# Patient Record
Sex: FEMALE | Race: BLACK OR AFRICAN AMERICAN | Employment: FULL TIME | URBAN - METROPOLITAN AREA
[De-identification: names, ages, dates, MRNs, and addresses within clinical notes are randomized per-mention and may not be internally consistent; named-entity substitution may affect disease eponyms.]

---

## 2018-04-24 ENCOUNTER — OFFICE VISIT (OUTPATIENT)
Dept: INTERNAL MEDICINE CLINIC | Age: 58
End: 2018-04-24

## 2018-04-24 VITALS
RESPIRATION RATE: 16 BRPM | DIASTOLIC BLOOD PRESSURE: 83 MMHG | WEIGHT: 200.1 LBS | HEIGHT: 66 IN | TEMPERATURE: 97.9 F | SYSTOLIC BLOOD PRESSURE: 143 MMHG | OXYGEN SATURATION: 97 % | BODY MASS INDEX: 32.16 KG/M2 | HEART RATE: 69 BPM

## 2018-04-24 DIAGNOSIS — H54.40 BLINDNESS OF LEFT EYE: ICD-10-CM

## 2018-04-24 DIAGNOSIS — I10 ESSENTIAL HYPERTENSION: ICD-10-CM

## 2018-04-24 DIAGNOSIS — Z76.89 ENCOUNTER TO ESTABLISH CARE: Primary | ICD-10-CM

## 2018-04-24 RX ORDER — LISINOPRIL AND HYDROCHLOROTHIAZIDE 10; 12.5 MG/1; MG/1
1 TABLET ORAL DAILY
Qty: 90 TAB | Refills: 3 | Status: SHIPPED | OUTPATIENT
Start: 2018-04-24 | End: 2019-05-16 | Stop reason: SDUPTHER

## 2018-04-24 RX ORDER — LISINOPRIL AND HYDROCHLOROTHIAZIDE 10; 12.5 MG/1; MG/1
TABLET ORAL
Refills: 0 | COMMUNITY
Start: 2018-04-18 | End: 2018-04-24 | Stop reason: SDUPTHER

## 2018-04-24 NOTE — PROGRESS NOTES
Chief Complaint   Patient presents with    Hypertension     1. Have you been to the ER, urgent care clinic since your last visit? Hospitalized since your last visit? Yes Where: [facility]  elevated Blood Pressure    2. Have you seen or consulted any other health care providers outside of the [address] [place] [address] since your last visit? Include any pap smears or colon screening.  No

## 2018-04-24 NOTE — PROGRESS NOTES
Hypertension       Subjective:   HPI     63year old patient presents to hospitals care, and newly diagnosed hypertension. She apparently went to eye doctor, Dr. Cyndee Ceja in [place] and was discovered her BP was 196/120. She was advised to go to the ER, but went home instead. Later went to [facility] and BP was 201/120; then went to the ER at [facility] on 4/18/18 and BP was 204/120. She was given an IV drip of unknown medication and prescribed Lisinopril-hydrochlorthiazide 10mg/12.5 mg daily. 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 She reports the following history and medical concerns:  No health concerns. Hypertension Review:  The patient has essential hypertension  Diet and Lifestyle: generally follows a  low sodium diet, exercises sporadically  Home BP Monitoring: is not measured at home. Pertinent ROS: taking medications as instructed, no medication side effects noted, no TIA's, no chest pain on exertion, no dyspnea on exertion. Mild edema right ankle. Past Medical History:   Diagnosis Date    Blindness of left eye     Hypertension        Past Surgical History:   Procedure Laterality Date    HX GYN         Prior to Admission medications    Medication Sig Start Date End Date Taking? Authorizing Provider   lisinopril-hydroCHLOROthiazide (PRINZIDE, ZESTORETIC) 10-12.5 mg per tablet Take 1 Tab by mouth daily. 4/24/18  Yes Kvng Gardner NP         Not on File     Social History     Social History    Marital status: UNKNOWN     Spouse name: N/A    Number of children: N/A    Years of education: N/A     Occupational History    Not on file.      Social History Main Topics    Smoking status: Former Smoker     Packs/day: 3.00    Smokeless tobacco: Never Used      Comment: smoker for 17 years; 3 cigs a day    Alcohol use 0.6 oz/week     1 Glasses of wine per week      Comment: social    Drug use: No    Sexual activity: No     Other Topics Concern    Not on file     Social History Narrative    No narrative on file        Family History   Problem Relation Age of Onset    Hypertension Mother     Hypertension Father     Hypertension Sister     Hypertension Brother         Review of Systems   Constitutional: Negative for chills, diaphoresis, fever, malaise/fatigue and weight loss. HENT: Negative for congestion, ear discharge, ear pain, hearing loss, nosebleeds, sinus pain, sore throat and tinnitus. Eyes: Negative for blurred vision, double vision, photophobia, pain, discharge and redness. Blind in the left eye   Respiratory: Negative for cough, shortness of breath and wheezing. Cardiovascular: Negative for chest pain, palpitations and leg swelling. Mild edema right ankle. Gastrointestinal: Negative for abdominal pain, diarrhea, heartburn, nausea and vomiting. Genitourinary: Negative for dysuria. Musculoskeletal: Negative for myalgias. Skin: Negative for itching and rash. Neurological: Negative for dizziness, tingling, tremors, sensory change, speech change, weakness and headaches. Psychiatric/Behavioral: Negative for depression. Assessment/ Plan:     Vitals:    04/24/18 1520   BP: 143/83   Pulse: 69   Resp: 16   Temp: 97.9 °F (36.6 °C)   TempSrc: Oral   SpO2: 97%   Weight: 200 lb 1.6 oz (90.8 kg)   Height: 5' 6\" (1.676 m)   PainSc:   0 - No pain        Physical Exam   Constitutional: She is oriented to person, place, and time. She appears well-nourished. HENT:   Head: Normocephalic and atraumatic. Eyes: Conjunctivae and EOM are normal. Pupils are equal, round, and reactive to light. Neck: Normal range of motion. Neck supple. No JVD present. No thyromegaly present. Cardiovascular: Normal rate, regular rhythm, normal heart sounds and intact distal pulses. Pulmonary/Chest: Effort normal and breath sounds normal. No respiratory distress. She has no wheezes. She has no rales. She exhibits no tenderness.    Musculoskeletal: Normal range of motion. Mild right ankle edema   Lymphadenopathy:     She has no cervical adenopathy. Neurological: She is alert and oriented to person, place, and time. She has normal reflexes. She displays normal reflexes. No cranial nerve deficit. She exhibits normal muscle tone. Coordination normal.   Skin: Skin is warm and dry. Psychiatric: She has a normal mood and affect. Nursing note and vitals reviewed. ICD-10-CM ICD-9-CM    1. Encounter to establish care Z76.89 V65.8    2. Essential hypertension I10 401.9    3. Blindness of left eye H54.40 369.60         Orders Placed This Encounter    DISCONTD: lisinopril-hydroCHLOROthiazide (PRINZIDE, ZESTORETIC) 10-12.5 mg per tablet     Sig: TK 1 T PO ONCE D FOR 30 DAYS     Refill:  0    lisinopril-hydroCHLOROthiazide (PRINZIDE, ZESTORETIC) 10-12.5 mg per tablet     Sig: Take 1 Tab by mouth daily. Dispense:  90 Tab     Refill:  3        Assessment/ Plan:     I have reviewed the patient's medical history in detail and updated the computerized patient record. We had a prolonged discussion about the complications of uncontrolled hypertension and the importance taking medication daily and as prescribed and went over the various important aspects to consider. All questions were answered. Advised her to call back, return to office, or go to ER  if symptoms do not improve, change in nature, or persist. Schedule appt for one week for physical exam, labs, and check BP. She was given an after visit summary or informed of [employer] [employer] which includes patient instructions, diagnoses, current medications, & vitals. She expressed understanding with the diagnosis and plan and was given the opportunity to ask questions.      Sophie Escobedo, KARYN

## 2018-04-24 NOTE — MR AVS SNAPSHOT
[address] [place] [address] 
[phone] Patient: Triston Durbin MRN: [id number] EBL:[id number] Visit Information Date & Time Provider Department Dept. Phone Encounter #  
 4/24/2018  3:30 PM Kit Ruiz NP [address] [facility] [id number] Follow-up Instructions Return in about 1 week (around 5/1/2018), or if symptoms worsen or fail to improve, for physical exam, labs, check BP. Upcoming Health Maintenance Date Due Hepatitis C Screening 1960 DTaP/Tdap/Td series (1 - Tdap) 2/17/1982 PAP AKA CERVICAL CYTOLOGY 2/17/1982 BREAST CANCER SCRN MAMMOGRAM 2/17/2011 FOBT Q 1 YEAR AGE 50-75 2/17/2011 Influenza Age 5 to Adult 8/1/2017 Allergies as of 4/24/2018  Review Complete On: 4/24/2018 By: Kit Ruiz NP Not on File Current Immunizations  Never Reviewed No immunizations on file. Not reviewed this visit You Were Diagnosed With   
  
 Codes Comments Essential hypertension    -  Primary ICD-10-CM: I10 
ICD-9-CM: 401.9 Blindness of left eye     ICD-10-CM: H54.40 ICD-9-CM: 369.60 Encounter to establish care     ICD-10-CM: Z76.89 
ICD-9-CM: V65.8 Vitals BP Pulse Temp Resp Height(growth percentile) Weight(growth percentile) 143/83 (BP 1 Location: Right arm, BP Patient Position: Sitting) 69 97.9 °F (36.6 °C) (Oral) 16 5' 6\" (1.676 m) 200 lb 1.6 oz (90.8 kg) SpO2 BMI OB Status Smoking Status 97% 32.3 kg/m2 Postmenopausal Former Smoker Vitals History BMI and BSA Data Body Mass Index Body Surface Area  
 32.3 kg/m 2 2.06 m 2 Preferred Pharmacy Pharmacy Name Phone [address] [facility]. [phone] Your Updated Medication List  
  
   
 This list is accurate as of 4/24/18  3:54 PM.  Always use your most recent med list.  
  
  
  
  
 lisinopril-hydroCHLOROthiazide 10-12.5 mg per tablet Commonly known as:  Jensen Valenzuela Take 1 Tab by mouth daily. Prescriptions Sent to Pharmacy Refills  
 lisinopril-hydroCHLOROthiazide (PRINZIDE, ZESTORETIC) 10-12.5 mg per tablet 3 Sig: Take 1 Tab by mouth daily. Class: Normal  
 Pharmacy: [facility] [address], [address] [place] [address] AT [address] [facility].  #: [phone] Route: Oral  
  
Follow-up Instructions Return in about 1 week (around 5/1/2018), or if symptoms worsen or fail to improve, for physical exam, labs, check BP. Patient Instructions High Blood Pressure: Care Instructions Your Care Instructions If your blood pressure is usually above 140/90, you have high blood pressure, or hypertension. That means the top number is 140 or higher or the bottom number is 90 or higher, or both. Despite what a lot of people think, high blood pressure usually doesn't cause headaches or make you feel dizzy or lightheaded. It usually has no symptoms. But it does increase your risk for heart attack, stroke, and kidney or eye damage. The higher your blood pressure, the more your risk increases. Your doctor will give you a goal for your blood pressure. Your goal will be based on your health and your age. An example of a goal is to keep your blood pressure below 140/90. Lifestyle changes, such as eating healthy and being active, are always important to help lower blood pressure. You might also take medicine to reach your blood pressure goal. 
Follow-up care is a key part of your treatment and safety. Be sure to make and go to all appointments, and call your doctor if you are having problems. It's also a good idea to know your test results and keep a list of the medicines you take. How can you care for yourself at home? Medical treatment · If you stop taking your medicine, your blood pressure will go back up. You may take one or more types of medicine to lower your blood pressure. Be safe with medicines. Take your medicine exactly as prescribed. Call your doctor if you think you are having a problem with your medicine. · Talk to your doctor before you start taking aspirin every day. Aspirin can help certain people lower their risk of a heart attack or stroke. But taking aspirin isn't right for everyone, because it can cause serious bleeding. · See your doctor regularly. You may need to see the doctor more often at first or until your blood pressure comes down. · If you are taking blood pressure medicine, talk to your doctor before you take decongestants or anti-inflammatory medicine, such as ibuprofen. Some of these medicines can raise blood pressure. · Learn how to check your blood pressure at home. Lifestyle changes · Stay at a healthy weight. This is especially important if you put on weight around the waist. Losing even 10 pounds can help you lower your blood pressure. · If your doctor recommends it, get more exercise. Walking is a good choice. Bit by bit, increase the amount you walk every day. Try for at least 30 minutes on most days of the week. You also may want to swim, bike, or do other activities. · Avoid or limit alcohol. Talk to your doctor about whether you can drink any alcohol. · Try to limit how much sodium you eat to less than 2,300 milligrams (mg) a day. Your doctor may ask you to try to eat less than 1,500 mg a day. · Eat plenty of fruits (such as bananas and oranges), vegetables, legumes, whole grains, and low-fat dairy products. · Lower the amount of saturated fat in your diet. Saturated fat is found in animal products such as milk, cheese, and meat. Limiting these foods may help you lose weight and also lower your risk for heart disease. · Do not smoke. Smoking increases your risk for heart attack and stroke. If you need help quitting, talk to your doctor about stop-smoking programs and medicines. These can increase your chances of quitting for good. When should you call for help? Call 911 anytime you think you may need emergency care. This may mean having symptoms that suggest that your blood pressure is causing a serious heart or blood vessel problem. Your blood pressure may be over 180/110. ? For example, call 911 if: 
? · You have symptoms of a heart attack. These may include: ¨ Chest pain or pressure, or a strange feeling in the chest. 
¨ Sweating. ¨ Shortness of breath. ¨ Nausea or vomiting. ¨ Pain, pressure, or a strange feeling in the back, neck, jaw, or upper belly or in one or both shoulders or arms. ¨ Lightheadedness or sudden weakness. ¨ A fast or irregular heartbeat. ? · You have symptoms of a stroke. These may include: 
¨ Sudden numbness, tingling, weakness, or loss of movement in your face, arm, or leg, especially on only one side of your body. ¨ Sudden vision changes. ¨ Sudden trouble speaking. ¨ Sudden confusion or trouble understanding simple statements. ¨ Sudden problems with walking or balance. ¨ A sudden, severe headache that is different from past headaches. ? · You have severe back or belly pain. ?Do not wait until your blood pressure comes down on its own. Get help right away. ?Call your doctor now or seek immediate care if: 
? · Your blood pressure is much higher than normal (such as 180/110 or higher), but you don't have symptoms. ? · You think high blood pressure is causing symptoms, such as: ¨ Severe headache. ¨ Blurry vision. ? Watch closely for changes in your health, and be sure to contact your doctor if: 
? · Your blood pressure measures 140/90 or higher at least 2 times. That means the top number is 140 or higher or the bottom number is 90 or higher, or both. ? · You think you may be having side effects from your blood pressure medicine. ? · Your blood pressure is usually normal, but it goes above normal at least 2 times. Where can you learn more? Go to http://lidia-glenis.info/. Enter Q280 in the search box to learn more about \"High Blood Pressure: Care Instructions. \" Current as of: September 21, 2016 Content Version: 11.4 © 7687-8729 [employer]. Care instructions adapted under license by [employer] (which disclaims liability or warranty for this information). If you have questions about a medical condition or this instruction, always ask your healthcare professional. [address] any warranty or liability for your use of this information. Introducing [facility]! [employer] introduces ideaTree - innovate | mentor | invest patient portal. Now you can access parts of your medical record, email your doctor's office, and request medication refills online. 1. In your internet browser, go to [email or web address] 2. Click on the First Time User? Click Here link in the Sign In box. You will see the New Member Sign Up page. 3. Enter your ideaTree - innovate | mentor | invest Access Code exactly as it appears below. You will not need to use this code after youve completed the sign-up process. If you do not sign up before the expiration date, you must request a new code. · ideaTree - innovate | mentor | invest Access Code: [email or web address] Expires: 7/23/2018  3:50 PM 
 
4. Enter the last four digits of your Social Security Number (xxxx) and Date of Birth (mm/dd/yyyy) as indicated and click Submit. You will be taken to the next sign-up page. 5. Create a ideaTree - innovate | mentor | invest ID. This will be your ideaTree - innovate | mentor | invest login ID and cannot be changed, so think of one that is secure and easy to remember. 6. Create a ideaTree - innovate | mentor | invest password. You can change your password at any time. 7. Enter your Password Reset Question and Answer.  This can be used at a later time if you forget your password. 8. Enter your e-mail address. You will receive e-mail notification when new information is available in [address] 9. Click Sign Up. You can now view and download portions of your medical record. 10. Click the Download Summary menu link to download a portable copy of your medical information. If you have questions, please visit the Frequently Asked Questions section of the [employer] website. Remember, [employer] is NOT to be used for urgent needs. For medical emergencies, dial 911. Now available from your iPhone and Android! Please provide this summary of care documentation to your next provider. If you have any questions after today's visit, please call [phone].

## 2018-04-24 NOTE — PATIENT INSTRUCTIONS
High Blood Pressure: Care Instructions  Your Care Instructions    If your blood pressure is usually above 140/90, you have high blood pressure, or hypertension. That means the top number is 140 or higher or the bottom number is 90 or higher, or both. Despite what a lot of people think, high blood pressure usually doesn't cause headaches or make you feel dizzy or lightheaded. It usually has no symptoms. But it does increase your risk for heart attack, stroke, and kidney or eye damage. The higher your blood pressure, the more your risk increases. Your doctor will give you a goal for your blood pressure. Your goal will be based on your health and your age. An example of a goal is to keep your blood pressure below 140/90. Lifestyle changes, such as eating healthy and being active, are always important to help lower blood pressure. You might also take medicine to reach your blood pressure goal.  Follow-up care is a key part of your treatment and safety. Be sure to make and go to all appointments, and call your doctor if you are having problems. It's also a good idea to know your test results and keep a list of the medicines you take. How can you care for yourself at home? Medical treatment  · If you stop taking your medicine, your blood pressure will go back up. You may take one or more types of medicine to lower your blood pressure. Be safe with medicines. Take your medicine exactly as prescribed. Call your doctor if you think you are having a problem with your medicine. · Talk to your doctor before you start taking aspirin every day. Aspirin can help certain people lower their risk of a heart attack or stroke. But taking aspirin isn't right for everyone, because it can cause serious bleeding. · See your doctor regularly. You may need to see the doctor more often at first or until your blood pressure comes down.   · If you are taking blood pressure medicine, talk to your doctor before you take decongestants or anti-inflammatory medicine, such as ibuprofen. Some of these medicines can raise blood pressure. · Learn how to check your blood pressure at home. Lifestyle changes  · Stay at a healthy weight. This is especially important if you put on weight around the waist. Losing even 10 pounds can help you lower your blood pressure. · If your doctor recommends it, get more exercise. Walking is a good choice. Bit by bit, increase the amount you walk every day. Try for at least 30 minutes on most days of the week. You also may want to swim, bike, or do other activities. · Avoid or limit alcohol. Talk to your doctor about whether you can drink any alcohol. · Try to limit how much sodium you eat to less than 2,300 milligrams (mg) a day. Your doctor may ask you to try to eat less than 1,500 mg a day. · Eat plenty of fruits (such as bananas and oranges), vegetables, legumes, whole grains, and low-fat dairy products. · Lower the amount of saturated fat in your diet. Saturated fat is found in animal products such as milk, cheese, and meat. Limiting these foods may help you lose weight and also lower your risk for heart disease. · Do not smoke. Smoking increases your risk for heart attack and stroke. If you need help quitting, talk to your doctor about stop-smoking programs and medicines. These can increase your chances of quitting for good. When should you call for help? Call 911 anytime you think you may need emergency care. This may mean having symptoms that suggest that your blood pressure is causing a serious heart or blood vessel problem. Your blood pressure may be over 180/110. ? For example, call 911 if:  ? · You have symptoms of a heart attack. These may include:  ¨ Chest pain or pressure, or a strange feeling in the chest.  ¨ Sweating. ¨ Shortness of breath. ¨ Nausea or vomiting.   ¨ Pain, pressure, or a strange feeling in the back, neck, jaw, or upper belly or in one or both shoulders or arms.  ¨ Lightheadedness or sudden weakness. ¨ A fast or irregular heartbeat. ? · You have symptoms of a stroke. These may include:  ¨ Sudden numbness, tingling, weakness, or loss of movement in your face, arm, or leg, especially on only one side of your body. ¨ Sudden vision changes. ¨ Sudden trouble speaking. ¨ Sudden confusion or trouble understanding simple statements. ¨ Sudden problems with walking or balance. ¨ A sudden, severe headache that is different from past headaches. ? · You have severe back or belly pain. ?Do not wait until your blood pressure comes down on its own. Get help right away. ?Call your doctor now or seek immediate care if:  ? · Your blood pressure is much higher than normal (such as 180/110 or higher), but you don't have symptoms. ? · You think high blood pressure is causing symptoms, such as:  ¨ Severe headache. ¨ Blurry vision. ? Watch closely for changes in your health, and be sure to contact your doctor if:  ? · Your blood pressure measures 140/90 or higher at least 2 times. That means the top number is 140 or higher or the bottom number is 90 or higher, or both. ? · You think you may be having side effects from your blood pressure medicine. ? · Your blood pressure is usually normal, but it goes above normal at least 2 times. Where can you learn more? Go to http://lidia-glenis.info/. Enter V302 in the search box to learn more about \"High Blood Pressure: Care Instructions. \"  Current as of: September 21, 2016  Content Version: 11.4  © 6061-0547 [employer]. Care instructions adapted under license by [employer] (which disclaims liability or warranty for this information). If you have questions about a medical condition or this instruction, always ask your healthcare professional. [place] [address] any warranty or liability for your use of this information.

## 2018-05-02 ENCOUNTER — OFFICE VISIT (OUTPATIENT)
Dept: INTERNAL MEDICINE CLINIC | Age: 58
End: 2018-05-02

## 2018-05-02 VITALS
HEART RATE: 74 BPM | DIASTOLIC BLOOD PRESSURE: 87 MMHG | RESPIRATION RATE: 16 BRPM | BODY MASS INDEX: 32.54 KG/M2 | SYSTOLIC BLOOD PRESSURE: 134 MMHG | HEIGHT: 66 IN | TEMPERATURE: 98.2 F | OXYGEN SATURATION: 97 % | WEIGHT: 202.5 LBS

## 2018-05-02 DIAGNOSIS — Z00.00 HEALTHCARE MAINTENANCE: ICD-10-CM

## 2018-05-02 DIAGNOSIS — Z00.00 ANNUAL PHYSICAL EXAM: Primary | ICD-10-CM

## 2018-05-02 DIAGNOSIS — Z13.1 SCREENING FOR DIABETES MELLITUS: ICD-10-CM

## 2018-05-02 DIAGNOSIS — Z12.39 SCREENING FOR BREAST CANCER: ICD-10-CM

## 2018-05-02 DIAGNOSIS — N89.8 VAGINAL MASS: ICD-10-CM

## 2018-05-02 DIAGNOSIS — Z13.220 SCREENING FOR CHOLESTEROL LEVEL: ICD-10-CM

## 2018-05-02 DIAGNOSIS — Z12.11 SCREEN FOR COLON CANCER: ICD-10-CM

## 2018-05-02 DIAGNOSIS — I10 ESSENTIAL HYPERTENSION: ICD-10-CM

## 2018-05-02 DIAGNOSIS — Z11.59 NEED FOR HEPATITIS C SCREENING TEST: ICD-10-CM

## 2018-05-02 DIAGNOSIS — H10.13 ALLERGIC CONJUNCTIVITIS OF BOTH EYES: ICD-10-CM

## 2018-05-02 RX ORDER — AZELASTINE HYDROCHLORIDE 0.5 MG/ML
1 SOLUTION/ DROPS OPHTHALMIC 2 TIMES DAILY
Qty: 6 ML | Refills: 0 | Status: SHIPPED | OUTPATIENT
Start: 2018-05-02 | End: 2019-08-01

## 2018-05-02 NOTE — MR AVS SNAPSHOT
Meghann Rakeshbrandon 
 
 
 [address] [facility],[address] [facility] 7 [address] 
[phone] Patient: Judith Barfield MRN: [id number] QEA:[id number] Visit Information Date & Time Provider Department Dept. Phone Encounter #  
 5/2/2018 10:30 AM Lencho Swanson NP [address] [facility] [phone] [id number] Follow-up Instructions Return in about 2 weeks (around 5/16/2018), or if symptoms worsen or fail to improve, for to discuss results and check BP. Upcoming Health Maintenance Date Due Hepatitis C Screening 1960 DTaP/Tdap/Td series (1 - Tdap) 2/17/1982 PAP AKA CERVICAL CYTOLOGY 2/17/1982 BREAST CANCER SCRN MAMMOGRAM 2/17/2011 FOBT Q 1 YEAR AGE 50-75 2/17/2011 Influenza Age 5 to Adult 8/1/2018 Allergies as of 5/2/2018  Review Complete On: 5/2/2018 By: Gaurang Mayo Severity Noted Reaction Type Reactions Seasonale [Levonorgestrel-ethinyl Estrad] Medium 05/02/2018    Runny Nose, Hoarseness Current Immunizations  Never Reviewed No immunizations on file. Not reviewed this visit You Were Diagnosed With   
  
 Codes Comments Annual physical exam    -  Primary ICD-10-CM: Z00.00 ICD-9-CM: V70.0 Essential hypertension     ICD-10-CM: I10 
ICD-9-CM: 401.9 Healthcare maintenance     ICD-10-CM: Z00.00 ICD-9-CM: V70.0 Screening for diabetes mellitus     ICD-10-CM: Z13.1 ICD-9-CM: V77.1 Screen for colon cancer     ICD-10-CM: Z12.11 ICD-9-CM: V76.51 Screening for cholesterol level     ICD-10-CM: Z13.220 ICD-9-CM: V77.91 Need for hepatitis C screening test     ICD-10-CM: Z11.59 
ICD-9-CM: V73.89 Screening for breast cancer     ICD-10-CM: Z12.31 
ICD-9-CM: V76.10 Vaginal mass     ICD-10-CM: N89.9 ICD-9-CM: 625.8 Allergic conjunctivitis of both eyes     ICD-10-CM: H10.13 ICD-9-CM: 372.14 Vitals BP Pulse Temp Resp Height(growth percentile) Weight(growth percentile) 134/87 (BP 1 Location: Left arm, BP Patient Position: Sitting) 74 98.2 °F (36.8 °C) (Oral) 16 5' 6\" (1.676 m) 202 lb 8 oz (91.9 kg) SpO2 BMI OB Status Smoking Status 97% 32.68 kg/m2 Postmenopausal Former Smoker Vitals History BMI and BSA Data Body Mass Index Body Surface Area  
 32.68 kg/m 2 2.07 m 2 Preferred Pharmacy Pharmacy Name Phone [address] AT [address] [facility]. [phone] Your Updated Medication List  
  
   
This list is accurate as of 5/2/18 11:36 AM.  Always use your most recent med list.  
  
  
  
  
 azelastine 0.05 % ophthalmic solution Commonly known as:  OPTIVAR Administer 1 Drop to both eyes two (2) times a day. Use in affected eye(s)  
  
 lisinopril-hydroCHLOROthiazide 10-12.5 mg per tablet Commonly known as:  Maritza Jacobo Take 1 Tab by mouth daily. Prescriptions Sent to Pharmacy Refills  
 azelastine (OPTIVAR) 0.05 % ophthalmic solution 0 Sig: Administer 1 Drop to both eyes two (2) times a day. Use in affected eye(s) Class: Normal  
 Pharmacy: [facility] [address] [place] [address], [address] [place] [address] [address] AT [address] [facility] [address] Ph #: [phone] Route: Both Eyes We Performed the Following AMB POC URINALYSIS DIP STICK AUTO W/O MICRO [04107 CPT(R)] CBC WITH AUTOMATED DIFF [81414 CPT(R)] HEMOGLOBIN A1C WITH EAG [46489 CPT(R)] HEPATITIS C AB [45761 CPT(R)] LIPID PANEL [05000 CPT(R)] METABOLIC PANEL, COMPREHENSIVE [17479 CPT(R)] OCCULT BLOOD, IMMUNOASSAY (FIT) [id number] CPT(R)] REFERRAL TO GASTROENTEROLOGY [NEC13 Custom] Comments:  
 Screening colonoscopy REFERRAL TO OBSTETRICS AND GYNECOLOGY [REF51 Custom] Comments:  
 Evaluate and treat abnormal vulvovaginal mass or prolapse. Unable to complete PAP smear. Hx of hemmrhoids. VITAMIN D, 25 HYDROXY [id number] CPT(R)] Follow-up Instructions Return in about 2 weeks (around 5/16/2018), or if symptoms worsen or fail to improve, for to discuss results and check BP. To-Do List   
 05/02/2018 Imaging:  [facility] MAMMO BI SCREENING INCL CAD Referral Information Referral ID Referred By Referred To  
  
 [id number] YAS, [address] [facility] [address] [place], [address] Visits Status Start Date End Date 1 New Request 5/2/18 5/2/19 If your referral has a status of pending review or denied, additional information will be sent to support the outcome of this decision. Referral ID Referred By Referred To  
 [id number] Edgar MILIAN [facility] [address] [facility] [address] [place], [address] Visits Status Start Date End Date 1 New Request 5/2/18 5/2/19 If your referral has a status of pending review or denied, additional information will be sent to support the outcome of this decision. Patient Instructions High Blood Pressure: Care Instructions Your Care Instructions If your blood pressure is usually above 140/90, you have high blood pressure, or hypertension. That means the top number is 140 or higher or the bottom number is 90 or higher, or both. Despite what a lot of people think, high blood pressure usually doesn't cause headaches or make you feel dizzy or lightheaded. It usually has no symptoms. But it does increase your risk for heart attack, stroke, and kidney or eye damage. The higher your blood pressure, the more your risk increases. Your doctor will give you a goal for your blood pressure. Your goal will be based on your health and your age. An example of a goal is to keep your blood pressure below 140/90.  
Lifestyle changes, such as eating healthy and being active, are always important to help lower blood pressure. You might also take medicine to reach your blood pressure goal. 
Follow-up care is a key part of your treatment and safety. Be sure to make and go to all appointments, and call your doctor if you are having problems. It's also a good idea to know your test results and keep a list of the medicines you take. How can you care for yourself at home? Medical treatment · If you stop taking your medicine, your blood pressure will go back up. You may take one or more types of medicine to lower your blood pressure. Be safe with medicines. Take your medicine exactly as prescribed. Call your doctor if you think you are having a problem with your medicine. · Talk to your doctor before you start taking aspirin every day. Aspirin can help certain people lower their risk of a heart attack or stroke. But taking aspirin isn't right for everyone, because it can cause serious bleeding. · See your doctor regularly. You may need to see the doctor more often at first or until your blood pressure comes down. · If you are taking blood pressure medicine, talk to your doctor before you take decongestants or anti-inflammatory medicine, such as ibuprofen. Some of these medicines can raise blood pressure. · Learn how to check your blood pressure at home. Lifestyle changes · Stay at a healthy weight. This is especially important if you put on weight around the waist. Losing even 10 pounds can help you lower your blood pressure. · If your doctor recommends it, get more exercise. Walking is a good choice. Bit by bit, increase the amount you walk every day. Try for at least 30 minutes on most days of the week. You also may want to swim, bike, or do other activities. · Avoid or limit alcohol. Talk to your doctor about whether you can drink any alcohol. · Try to limit how much sodium you eat to less than 2,300 milligrams (mg) a day. Your doctor may ask you to try to eat less than 1,500 mg a day. · Eat plenty of fruits (such as bananas and oranges), vegetables, legumes, whole grains, and low-fat dairy products. · Lower the amount of saturated fat in your diet. Saturated fat is found in animal products such as milk, cheese, and meat. Limiting these foods may help you lose weight and also lower your risk for heart disease. · Do not smoke. Smoking increases your risk for heart attack and stroke. If you need help quitting, talk to your doctor about stop-smoking programs and medicines. These can increase your chances of quitting for good. When should you call for help? Call 911 anytime you think you may need emergency care. This may mean having symptoms that suggest that your blood pressure is causing a serious heart or blood vessel problem. Your blood pressure may be over 180/110. ? For example, call 911 if: 
? · You have symptoms of a heart attack. These may include: ¨ Chest pain or pressure, or a strange feeling in the chest. 
¨ Sweating. ¨ Shortness of breath. ¨ Nausea or vomiting. ¨ Pain, pressure, or a strange feeling in the back, neck, jaw, or upper belly or in one or both shoulders or arms. ¨ Lightheadedness or sudden weakness. ¨ A fast or irregular heartbeat. ? · You have symptoms of a stroke. These may include: 
¨ Sudden numbness, tingling, weakness, or loss of movement in your face, arm, or leg, especially on only one side of your body. ¨ Sudden vision changes. ¨ Sudden trouble speaking. ¨ Sudden confusion or trouble understanding simple statements. ¨ Sudden problems with walking or balance. ¨ A sudden, severe headache that is different from past headaches. ? · You have severe back or belly pain. ?Do not wait until your blood pressure comes down on its own. Get help right away. ?Call your doctor now or seek immediate care if: 
? · Your blood pressure is much higher than normal (such as 180/110 or higher), but you don't have symptoms. ? · You think high blood pressure is causing symptoms, such as: ¨ Severe headache. ¨ Blurry vision. ? Watch closely for changes in your health, and be sure to contact your doctor if: 
? · Your blood pressure measures 140/90 or higher at least 2 times. That means the top number is 140 or higher or the bottom number is 90 or higher, or both. ? · You think you may be having side effects from your blood pressure medicine. ? · Your blood pressure is usually normal, but it goes above normal at least 2 times. Where can you learn more? Go to http://lidia-glenis.info/. Enter Y373 in the search box to learn more about \"High Blood Pressure: Care Instructions. \" Current as of: September 21, 2016 Content Version: 11.4 © 0629-4476 [employer]. Care instructions adapted under license by [employer] (which disclaims liability or warranty for this information). If you have questions about a medical condition or this instruction, always ask your healthcare professional. [place] [address] any warranty or liability for your use of this information. Well Visit, Women 48 to 72: Care Instructions Your Care Instructions Physical exams can help you stay healthy. Your doctor has checked your overall health and may have suggested ways to take good care of yourself. He or she also may have recommended tests. At home, you can help prevent illness with healthy eating, regular exercise, and other steps. Follow-up care is a key part of your treatment and safety. Be sure to make and go to all appointments, and call your doctor if you are having problems. It's also a good idea to know your test results and keep a list of the medicines you take. How can you care for yourself at home? · Reach and stay at a healthy weight. This will lower your risk for many problems, such as obesity, diabetes, heart disease, and high blood pressure. · Get at least 30 minutes of exercise on most days of the week. Walking is a good choice. You also may want to do other activities, such as running, swimming, cycling, or playing tennis or team sports. · Do not smoke. Smoking can make health problems worse. If you need help quitting, talk to your doctor about stop-smoking programs and medicines. These can increase your chances of quitting for good. · Protect your skin from too much sun. When you're outdoors from 10 a.m. to 4 p.m., stay in the shade or cover up with clothing and a hat with a wide brim. Wear sunglasses that block UV rays. Even when it's cloudy, put broad-spectrum sunscreen (SPF 30 or higher) on any exposed skin. · See a dentist one or two times a year for checkups and to have your teeth cleaned. · Wear a seat belt in the car. · Limit alcohol to 1 drink a day. Too much alcohol can cause health problems. Follow your doctor's advice about when to have certain tests. These tests can spot problems early. · Cholesterol. Your doctor will tell you how often to have this done based on your age, family history, or other things that can increase your risk for heart attack and stroke. · Blood pressure. Have your blood pressure checked during a routine doctor visit. Your doctor will tell you how often to check your blood pressure based on your age, your blood pressure results, and other factors. · Mammogram. Ask your doctor how often you should have a mammogram, which is an X-ray of your breasts. A mammogram can spot breast cancer before it can be felt and when it is easiest to treat. · Pap test and pelvic exam. Ask your doctor how often you should have a Pap test. You may not need to have a Pap test as often as you used to. · Vision. Have your eyes checked every year or two or as often as your doctor suggests. Some experts recommend that you have yearly exams for glaucoma and other age-related eye problems starting at age 48. · Hearing. Tell your doctor if you notice any change in your hearing. You can have tests to find out how well you hear. · Diabetes. Ask your doctor whether you should have tests for diabetes. · Colon cancer. You should begin tests for colon cancer at age 48. You may have one of several tests. Your doctor will tell you how often to have tests based on your age and risk. Risks include whether you already had a precancerous polyp removed from your colon or whether your parents, sisters and brothers, or children have had colon cancer. · Thyroid disease. Talk to your doctor about whether to have your thyroid checked as part of a regular physical exam. Women have an increased chance of a thyroid problem. · Osteoporosis. You should begin tests for bone density at age 72. If you are younger than 72, ask your doctor whether you have factors that may increase your risk for this disease. You may want to have this test before age 72. · Heart attack and stroke risk. At least every 4 to 6 years, you should have your risk for heart attack and stroke assessed. Your doctor uses factors such as your age, blood pressure, cholesterol, and whether you smoke or have diabetes to show what your risk for a heart attack or stroke is over the next 10 years. When should you call for help? Watch closely for changes in your health, and be sure to contact your doctor if you have any problems or symptoms that concern you. Where can you learn more? Go to http://lidia-glenis.info/. Enter M269 in the search box to learn more about \"Well Visit, Women 50 to 72: Care Instructions. \" Current as of: May 12, 2017 Content Version: 11.4 © 8066-7136 [employer]. Care instructions adapted under license by [employer] (which disclaims liability or warranty for this information).  If you have questions about a medical condition or this instruction, always ask your healthcare professional. [employer] disclaims any warranty or liability for your use of this information. Learning About High Blood Pressure What is high blood pressure? Blood pressure is a measure of how hard the blood pushes against the walls of your arteries. It's normal for blood pressure to go up and down throughout the day, but if it stays up, you have high blood pressure. Another name for high blood pressure is hypertension. Two numbers tell you your blood pressure. The first number is the systolic pressure. It shows how hard the blood pushes when your heart is pumping. The second number is the diastolic pressure. It shows how hard the blood pushes between heartbeats, when your heart is relaxed and filling with blood. A blood pressure of less than 120/80 (say \"120 over 80\") is ideal for an adult. High blood pressure is 140/90 or higher. You have high blood pressure if your top number is 140 or higher or your bottom number is 90 or higher, or both. Many people fall into the category in between, called prehypertension. People with prehypertension need to make lifestyle changes to bring their blood pressure down and help prevent or delay high blood pressure. What happens when you have high blood pressure? · Blood flows through your arteries with too much force. Over time, this damages the walls of your arteries. But you can't feel it. High blood pressure usually doesn't cause symptoms. · Fat and calcium start to build up in your arteries. This buildup is called plaque. Plaque makes your arteries narrower and stiffer. Blood can't flow through them as easily. · This lack of good blood flow starts to damage some of the organs in your body. This can lead to problems such as coronary artery disease and heart attack, heart failure, stroke, kidney failure, and eye damage. How can you prevent high blood pressure? · Stay at a healthy weight.  
· Try to limit how much sodium you eat to less than 2,300 milligrams (mg) a day. If you limit your sodium to 1,500 mg a day, you can lower your blood pressure even more. ¨ Buy foods that are labeled \"unsalted,\" \"sodium-free,\" or \"low-sodium. \" Foods labeled \"reduced-sodium\" and \"light sodium\" may still have too much sodium. ¨ Flavor your food with garlic, lemon juice, onion, vinegar, herbs, and spices instead of salt. Do not use soy sauce, steak sauce, onion salt, garlic salt, mustard, or ketchup on your food. ¨ Use less salt (or none) when recipes call for it. You can often use half the salt a recipe calls for without losing flavor. · Be physically active. Get at least 30 minutes of exercise on most days of the week. Walking is a good choice. You also may want to do other activities, such as running, swimming, cycling, or playing tennis or team sports. · Limit alcohol to 2 drinks a day for men and 1 drink a day for women. · Eat plenty of fruits, vegetables, and low-fat dairy products. Eat less saturated and total fats. How is high blood pressure treated? · Your doctor will suggest making lifestyle changes. For example, your doctor may ask you to eat healthy foods, quit smoking, lose extra weight, and be more active. · If lifestyle changes don't help enough or your blood pressure is very high, you will have to take medicine every day. Follow-up care is a key part of your treatment and safety. Be sure to make and go to all appointments, and call your doctor if you are having problems. It's also a good idea to know your test results and keep a list of the medicines you take. Where can you learn more? Go to http://lidia-glenis.info/. Enter P501 in the search box to learn more about \"Learning About High Blood Pressure. \" Current as of: September 21, 2016 Content Version: 11.4 © 1442-8678 [employer], [employer].  Care instructions adapted under license by [employer] (which disclaims liability or warranty for this information). If you have questions about a medical condition or this instruction, always ask your healthcare professional. [address] any warranty or liability for your use of this information. Allergic Conjunctivitis in Children: Care Instructions Your Care Instructions Allergic conjunctivitis (say \"ool-QGVK-gfn-VY-tus\") is an eye problem that many children get. It is often called pinkeye. In pinkeye, the lining of the eyelid and the eye surface become red and swollen. The lining is called the conjunctiva (say \"ypsk-yiyh-KG-vuh\"). Pinkeye can be caused by bacteria, a virus, or an allergy. Your child's pinkeye is caused by an allergy. A substance (allergen) triggers a reaction that results in the symptoms. This type of pinkeye cannot be spread from person to person. Your child may have other symptoms of an allergy, such as a runny nose. Allergic pinkeye goes away when you keep your child away from the allergen that triggers the pinkeye. Triggers include pollen, mold, and animal skin cells (dander). But because it is not always possible to stay away from triggers, your doctor may suggest eyedrops to treat the symptoms. Antibiotics do not help with allergies. Follow-up care is a key part of your child's treatment and safety. Be sure to make and go to all appointments, and call your doctor if your child is having problems. It's also a good idea to know your child's test results and keep a list of the medicines your child takes. How can you care for your child at home? Use medicines as directed · Have your child take medicines exactly as prescribed. Call your doctor if you think your child is having a problem with his or her medicine. You will get more details on the specific medicines your doctor prescribes. · If the doctor gave your child eyedrops, use them as directed. Keep the bottle tip clean. To put in eyedrops: ¨ Tilt your child's head back and pull his or her lower eyelid down with one finger. ¨ Drop or squirt the medicine inside the lower lid. ¨ Have your child close the eye for 30 to 60 seconds to let the drops move around. ¨ Do not touch the tip of the bottle to your child's eyelashes or any other surface. Make your child comfortable · Use moist cotton or a clean, wet cloth to remove the crust from your child's eyes. Wipe from the inside corner of the eye to the outside. Use a clean part of the cloth for each wipe. · Put cold or warm wet cloths on your child's eyes a few times a day if the eyes hurt or are itching. · Do not have your child wear contact lenses until the pinkeye is gone. Clean the contacts and storage case. · If your child wears disposable contacts, get out a new pair when the eyes have cleared and it is safe to wear contacts again. Avoid triggers · Try to find what triggers the pinkeye. Then take steps to avoid it. For example: ¨ Control animal dander and other pet allergens by keeping pets only in certain areas of your home. ¨ Avoid outdoor pollens by keeping your child inside while pollen counts are high. ¨ Control indoor mold by cleaning bathtubs and showers monthly. When should you call for help? Call your doctor now or seek immediate medical care if: 
? · Your child has pain in an eye, not just irritation on the surface. ? · Your child has a change in vision or a loss of vision. ? · Pinkeye lasts longer than 7 days. ? Watch closely for changes in your child's health, and be sure to contact your doctor if: 
? · Your child does not get better as expected. Where can you learn more? Go to http://lidia-glenis.info/. Enter K741 in the search box to learn more about \"Allergic Conjunctivitis in Children: Care Instructions. \" Current as of: March 20, 2017 Content Version: 11.4 © 0778-3394 [employer]wise[employer] Inc[employer].  Care instructions adapted under license by [address] (which disclaims liability or warranty for this information). If you have questions about a medical condition or this instruction, always ask your healthcare professional. [address] any warranty or liability for your use of this information. Introducing [facility]! [employer] introduces Cloud Content patient portal. Now you can access parts of your medical record, email your doctor's office, and request medication refills online. 1. In your internet browser, go to [email or web address] 2. Click on the First Time User? Click Here link in the Sign In box. You will see the New Member Sign Up page. 3. Enter your Cloud Content Access Code exactly as it appears below. You will not need to use this code after youve completed the sign-up process. If you do not sign up before the expiration date, you must request a new code. · Cloud Content Access Code: [email or web address] Expires: 7/23/2018  3:50 PM 
 
4. Enter the last four digits of your Social Security Number (xxxx) and Date of Birth (mm/dd/yyyy) as indicated and click Submit. You will be taken to the next sign-up page. 5. Create a Cloud Content ID. This will be your Cloud Content login ID and cannot be changed, so think of one that is secure and easy to remember. 6. Create a Cloud Content password. You can change your password at any time. 7. Enter your Password Reset Question and Answer. This can be used at a later time if you forget your password. 8. Enter your e-mail address. You will receive e-mail notification when new information is available in [address] 9. Click Sign Up. You can now view and download portions of your medical record. 10. Click the Download Summary menu link to download a portable copy of your medical information. If you have questions, please visit the Frequently Asked Questions section of the [employer] website.  Remember, [employer] is NOT to be used for urgent needs. For medical emergencies, dial 911. Now available from your iPhone and Android! Please provide this summary of care documentation to your next provider. If you have any questions after today's visit, please call [phone].

## 2018-05-02 NOTE — PROGRESS NOTES
Chief Complaint   Patient presents with    Annual Wellness Visit    Hypertension    Labs     1. Have you been to the ER, urgent care clinic since your last visit? Hospitalized since your last visit? No    2. Have you seen or consulted any other health care providers outside of the [address] [place] [address] since your last visit? Include any pap smears or colon screening.  No

## 2018-05-02 NOTE — PATIENT INSTRUCTIONS
High Blood Pressure: Care Instructions  Your Care Instructions    If your blood pressure is usually above 140/90, you have high blood pressure, or hypertension. That means the top number is 140 or higher or the bottom number is 90 or higher, or both. Despite what a lot of people think, high blood pressure usually doesn't cause headaches or make you feel dizzy or lightheaded. It usually has no symptoms. But it does increase your risk for heart attack, stroke, and kidney or eye damage. The higher your blood pressure, the more your risk increases. Your doctor will give you a goal for your blood pressure. Your goal will be based on your health and your age. An example of a goal is to keep your blood pressure below 140/90. Lifestyle changes, such as eating healthy and being active, are always important to help lower blood pressure. You might also take medicine to reach your blood pressure goal.  Follow-up care is a key part of your treatment and safety. Be sure to make and go to all appointments, and call your doctor if you are having problems. It's also a good idea to know your test results and keep a list of the medicines you take. How can you care for yourself at home? Medical treatment  · If you stop taking your medicine, your blood pressure will go back up. You may take one or more types of medicine to lower your blood pressure. Be safe with medicines. Take your medicine exactly as prescribed. Call your doctor if you think you are having a problem with your medicine. · Talk to your doctor before you start taking aspirin every day. Aspirin can help certain people lower their risk of a heart attack or stroke. But taking aspirin isn't right for everyone, because it can cause serious bleeding. · See your doctor regularly. You may need to see the doctor more often at first or until your blood pressure comes down.   · If you are taking blood pressure medicine, talk to your doctor before you take decongestants or anti-inflammatory medicine, such as ibuprofen. Some of these medicines can raise blood pressure. · Learn how to check your blood pressure at home. Lifestyle changes  · Stay at a healthy weight. This is especially important if you put on weight around the waist. Losing even 10 pounds can help you lower your blood pressure. · If your doctor recommends it, get more exercise. Walking is a good choice. Bit by bit, increase the amount you walk every day. Try for at least 30 minutes on most days of the week. You also may want to swim, bike, or do other activities. · Avoid or limit alcohol. Talk to your doctor about whether you can drink any alcohol. · Try to limit how much sodium you eat to less than 2,300 milligrams (mg) a day. Your doctor may ask you to try to eat less than 1,500 mg a day. · Eat plenty of fruits (such as bananas and oranges), vegetables, legumes, whole grains, and low-fat dairy products. · Lower the amount of saturated fat in your diet. Saturated fat is found in animal products such as milk, cheese, and meat. Limiting these foods may help you lose weight and also lower your risk for heart disease. · Do not smoke. Smoking increases your risk for heart attack and stroke. If you need help quitting, talk to your doctor about stop-smoking programs and medicines. These can increase your chances of quitting for good. When should you call for help? Call 911 anytime you think you may need emergency care. This may mean having symptoms that suggest that your blood pressure is causing a serious heart or blood vessel problem. Your blood pressure may be over 180/110. ? For example, call 911 if:  ? · You have symptoms of a heart attack. These may include:  ¨ Chest pain or pressure, or a strange feeling in the chest.  ¨ Sweating. ¨ Shortness of breath. ¨ Nausea or vomiting.   ¨ Pain, pressure, or a strange feeling in the back, neck, jaw, or upper belly or in one or both shoulders or arms.  ¨ Lightheadedness or sudden weakness. ¨ A fast or irregular heartbeat. ? · You have symptoms of a stroke. These may include:  ¨ Sudden numbness, tingling, weakness, or loss of movement in your face, arm, or leg, especially on only one side of your body. ¨ Sudden vision changes. ¨ Sudden trouble speaking. ¨ Sudden confusion or trouble understanding simple statements. ¨ Sudden problems with walking or balance. ¨ A sudden, severe headache that is different from past headaches. ? · You have severe back or belly pain. ?Do not wait until your blood pressure comes down on its own. Get help right away. ?Call your doctor now or seek immediate care if:  ? · Your blood pressure is much higher than normal (such as 180/110 or higher), but you don't have symptoms. ? · You think high blood pressure is causing symptoms, such as:  ¨ Severe headache. ¨ Blurry vision. ? Watch closely for changes in your health, and be sure to contact your doctor if:  ? · Your blood pressure measures 140/90 or higher at least 2 times. That means the top number is 140 or higher or the bottom number is 90 or higher, or both. ? · You think you may be having side effects from your blood pressure medicine. ? · Your blood pressure is usually normal, but it goes above normal at least 2 times. Where can you learn more? Go to http://lidia-glenis.info/. Enter S703 in the search box to learn more about \"High Blood Pressure: Care Instructions. \"  Current as of: September 21, 2016  Content Version: 11.4  © 7539-0832 [employer]. Care instructions adapted under license by [employer] (which disclaims liability or warranty for this information). If you have questions about a medical condition or this instruction, always ask your healthcare professional. [place] [address] any warranty or liability for your use of this information.        Well Visit, Women 48 to 72: Care Instructions  Your Care Instructions    Physical exams can help you stay healthy. Your doctor has checked your overall health and may have suggested ways to take good care of yourself. He or she also may have recommended tests. At home, you can help prevent illness with healthy eating, regular exercise, and other steps. Follow-up care is a key part of your treatment and safety. Be sure to make and go to all appointments, and call your doctor if you are having problems. It's also a good idea to know your test results and keep a list of the medicines you take. How can you care for yourself at home? · Reach and stay at a healthy weight. This will lower your risk for many problems, such as obesity, diabetes, heart disease, and high blood pressure. · Get at least 30 minutes of exercise on most days of the week. Walking is a good choice. You also may want to do other activities, such as running, swimming, cycling, or playing tennis or team sports. · Do not smoke. Smoking can make health problems worse. If you need help quitting, talk to your doctor about stop-smoking programs and medicines. These can increase your chances of quitting for good. · Protect your skin from too much sun. When you're outdoors from 10 a.m. to 4 p.m., stay in the shade or cover up with clothing and a hat with a wide brim. Wear sunglasses that block UV rays. Even when it's cloudy, put broad-spectrum sunscreen (SPF 30 or higher) on any exposed skin. · See a dentist one or two times a year for checkups and to have your teeth cleaned. · Wear a seat belt in the car. · Limit alcohol to 1 drink a day. Too much alcohol can cause health problems. Follow your doctor's advice about when to have certain tests. These tests can spot problems early. · Cholesterol. Your doctor will tell you how often to have this done based on your age, family history, or other things that can increase your risk for heart attack and stroke. · Blood pressure.  Have your blood pressure checked during a routine doctor visit. Your doctor will tell you how often to check your blood pressure based on your age, your blood pressure results, and other factors. · Mammogram. Ask your doctor how often you should have a mammogram, which is an X-ray of your breasts. A mammogram can spot breast cancer before it can be felt and when it is easiest to treat. · Pap test and pelvic exam. Ask your doctor how often you should have a Pap test. You may not need to have a Pap test as often as you used to. · Vision. Have your eyes checked every year or two or as often as your doctor suggests. Some experts recommend that you have yearly exams for glaucoma and other age-related eye problems starting at age 48. · Hearing. Tell your doctor if you notice any change in your hearing. You can have tests to find out how well you hear. · Diabetes. Ask your doctor whether you should have tests for diabetes. · Colon cancer. You should begin tests for colon cancer at age 48. You may have one of several tests. Your doctor will tell you how often to have tests based on your age and risk. Risks include whether you already had a precancerous polyp removed from your colon or whether your parents, sisters and brothers, or children have had colon cancer. · Thyroid disease. Talk to your doctor about whether to have your thyroid checked as part of a regular physical exam. Women have an increased chance of a thyroid problem. · Osteoporosis. You should begin tests for bone density at age 72. If you are younger than 72, ask your doctor whether you have factors that may increase your risk for this disease. You may want to have this test before age 72. · Heart attack and stroke risk. At least every 4 to 6 years, you should have your risk for heart attack and stroke assessed.  Your doctor uses factors such as your age, blood pressure, cholesterol, and whether you smoke or have diabetes to show what your risk for a heart attack or stroke is over the next 10 years. When should you call for help? Watch closely for changes in your health, and be sure to contact your doctor if you have any problems or symptoms that concern you. Where can you learn more? Go to http://lidia-glenis.info/. Enter W324 in the search box to learn more about \"Well Visit, Women 50 to 72: Care Instructions. \"  Current as of: May 12, 2017  Content Version: 11.4  © 7755-0580 [employer]. Care instructions adapted under license by [employer] (which disclaims liability or warranty for this information). If you have questions about a medical condition or this instruction, always ask your healthcare professional. [place] [address] any warranty or liability for your use of this information. Learning About High Blood Pressure  What is high blood pressure? Blood pressure is a measure of how hard the blood pushes against the walls of your arteries. It's normal for blood pressure to go up and down throughout the day, but if it stays up, you have high blood pressure. Another name for high blood pressure is hypertension. Two numbers tell you your blood pressure. The first number is the systolic pressure. It shows how hard the blood pushes when your heart is pumping. The second number is the diastolic pressure. It shows how hard the blood pushes between heartbeats, when your heart is relaxed and filling with blood. A blood pressure of less than 120/80 (say \"120 over 80\") is ideal for an adult. High blood pressure is 140/90 or higher. You have high blood pressure if your top number is 140 or higher or your bottom number is 90 or higher, or both. Many people fall into the category in between, called prehypertension. People with prehypertension need to make lifestyle changes to bring their blood pressure down and help prevent or delay high blood pressure.   What happens when you have high blood pressure? · Blood flows through your arteries with too much force. Over time, this damages the walls of your arteries. But you can't feel it. High blood pressure usually doesn't cause symptoms. · Fat and calcium start to build up in your arteries. This buildup is called plaque. Plaque makes your arteries narrower and stiffer. Blood can't flow through them as easily. · This lack of good blood flow starts to damage some of the organs in your body. This can lead to problems such as coronary artery disease and heart attack, heart failure, stroke, kidney failure, and eye damage. How can you prevent high blood pressure? · Stay at a healthy weight. · Try to limit how much sodium you eat to less than 2,300 milligrams (mg) a day. If you limit your sodium to 1,500 mg a day, you can lower your blood pressure even more. ¨ Buy foods that are labeled \"unsalted,\" \"sodium-free,\" or \"low-sodium. \" Foods labeled \"reduced-sodium\" and \"light sodium\" may still have too much sodium. ¨ Flavor your food with garlic, lemon juice, onion, vinegar, herbs, and spices instead of salt. Do not use soy sauce, steak sauce, onion salt, garlic salt, mustard, or ketchup on your food. ¨ Use less salt (or none) when recipes call for it. You can often use half the salt a recipe calls for without losing flavor. · Be physically active. Get at least 30 minutes of exercise on most days of the week. Walking is a good choice. You also may want to do other activities, such as running, swimming, cycling, or playing tennis or team sports. · Limit alcohol to 2 drinks a day for men and 1 drink a day for women. · Eat plenty of fruits, vegetables, and low-fat dairy products. Eat less saturated and total fats. How is high blood pressure treated? · Your doctor will suggest making lifestyle changes. For example, your doctor may ask you to eat healthy foods, quit smoking, lose extra weight, and be more active.   · If lifestyle changes don't help enough or your blood pressure is very high, you will have to take medicine every day. Follow-up care is a key part of your treatment and safety. Be sure to make and go to all appointments, and call your doctor if you are having problems. It's also a good idea to know your test results and keep a list of the medicines you take. Where can you learn more? Go to http://lidia-glenis.info/. Enter P501 in the search box to learn more about \"Learning About High Blood Pressure. \"  Current as of: September 21, 2016  Content Version: 11.4  © 5202-1718 "[employer]". Care instructions adapted under license by [employer] (which disclaims liability or warranty for this information). If you have questions about a medical condition or this instruction, always ask your healthcare professional. [address] any warranty or liability for your use of this information. Allergic Conjunctivitis in Children: Care Instructions  Your Care Instructions    Allergic conjunctivitis (say \"qmv-VTAR-fvl-VY-tus\") is an eye problem that many children get. It is often called pinkeye. In pinkeye, the lining of the eyelid and the eye surface become red and swollen. The lining is called the conjunctiva (say \"lmfa-dzvi-FD-vuh\"). Pinkeye can be caused by bacteria, a virus, or an allergy. Your child's pinkeye is caused by an allergy. A substance (allergen) triggers a reaction that results in the symptoms. This type of pinkeye cannot be spread from person to person. Your child may have other symptoms of an allergy, such as a runny nose. Allergic pinkeye goes away when you keep your child away from the allergen that triggers the pinkeye. Triggers include pollen, mold, and animal skin cells (dander). But because it is not always possible to stay away from triggers, your doctor may suggest eyedrops to treat the symptoms. Antibiotics do not help with allergies.   Follow-up care is a key part of your child's treatment and safety. Be sure to make and go to all appointments, and call your doctor if your child is having problems. It's also a good idea to know your child's test results and keep a list of the medicines your child takes. How can you care for your child at home? Use medicines as directed  · Have your child take medicines exactly as prescribed. Call your doctor if you think your child is having a problem with his or her medicine. You will get more details on the specific medicines your doctor prescribes. · If the doctor gave your child eyedrops, use them as directed. Keep the bottle tip clean. To put in eyedrops:  ¨ Tilt your child's head back and pull his or her lower eyelid down with one finger. ¨ Drop or squirt the medicine inside the lower lid. ¨ Have your child close the eye for 30 to 60 seconds to let the drops move around. ¨ Do not touch the tip of the bottle to your child's eyelashes or any other surface. Make your child comfortable  · Use moist cotton or a clean, wet cloth to remove the crust from your child's eyes. Wipe from the inside corner of the eye to the outside. Use a clean part of the cloth for each wipe. · Put cold or warm wet cloths on your child's eyes a few times a day if the eyes hurt or are itching. · Do not have your child wear contact lenses until the pinkeye is gone. Clean the contacts and storage case. · If your child wears disposable contacts, get out a new pair when the eyes have cleared and it is safe to wear contacts again. Avoid triggers  · Try to find what triggers the pinkeye. Then take steps to avoid it. For example:  ¨ Control animal dander and other pet allergens by keeping pets only in certain areas of your home. ¨ Avoid outdoor pollens by keeping your child inside while pollen counts are high. ¨ Control indoor mold by cleaning bathtubs and showers monthly. When should you call for help?   Call your doctor now or seek immediate medical care if:  ? · Your child has pain in an eye, not just irritation on the surface. ? · Your child has a change in vision or a loss of vision. ? · Pinkeye lasts longer than 7 days. ? Watch closely for changes in your child's health, and be sure to contact your doctor if:  ? · Your child does not get better as expected. Where can you learn more? Go to http://lidia-glenis.info/. Enter X764 in the search box to learn more about \"Allergic Conjunctivitis in Children: Care Instructions. \"  Current as of: March 20, 2017  Content Version: 11.4  © 5424-2173 [employer]. Care instructions adapted under license by [employer] (which disclaims liability or warranty for this information). If you have questions about a medical condition or this instruction, always ask your healthcare professional. [address] any warranty or liability for your use of this information.

## 2018-05-03 LAB
25(OH)D3+25(OH)D2 SERPL-MCNC: 10.3 NG/ML (ref 30–100)
ALBUMIN SERPL-MCNC: 4.6 G/DL (ref 3.5–5.5)
ALBUMIN/GLOB SERPL: 1.4 {RATIO} (ref 1.2–2.2)
ALP SERPL-CCNC: 90 IU/L (ref 39–117)
ALT SERPL-CCNC: 14 IU/L (ref 0–32)
AST SERPL-CCNC: 15 IU/L (ref 0–40)
BASOPHILS # BLD AUTO: 0 X10E3/UL (ref 0–0.2)
BASOPHILS NFR BLD AUTO: 0 %
BILIRUB SERPL-MCNC: 0.2 MG/DL (ref 0–1.2)
BUN SERPL-MCNC: 15 MG/DL (ref 6–24)
BUN/CREAT SERPL: 21 (ref 9–23)
CALCIUM SERPL-MCNC: 9.8 MG/DL (ref 8.7–10.2)
CHLORIDE SERPL-SCNC: 99 MMOL/L (ref 96–106)
CHOLEST SERPL-MCNC: 249 MG/DL (ref 100–199)
CO2 SERPL-SCNC: 27 MMOL/L (ref 18–29)
CREAT SERPL-MCNC: 0.71 MG/DL (ref 0.57–1)
EOSINOPHIL # BLD AUTO: 0.5 X10E3/UL (ref 0–0.4)
EOSINOPHIL NFR BLD AUTO: 7 %
ERYTHROCYTE [DISTWIDTH] IN BLOOD BY AUTOMATED COUNT: 13.2 % (ref 12.3–15.4)
EST. AVERAGE GLUCOSE BLD GHB EST-MCNC: 114 MG/DL
GFR SERPLBLD CREATININE-BSD FMLA CKD-EPI: 109 ML/MIN/1.73
GFR SERPLBLD CREATININE-BSD FMLA CKD-EPI: 95 ML/MIN/1.73
GLOBULIN SER CALC-MCNC: 3.4 G/DL (ref 1.5–4.5)
GLUCOSE SERPL-MCNC: 103 MG/DL (ref 65–99)
HBA1C MFR BLD: 5.6 % (ref 4.8–5.6)
HCT VFR BLD AUTO: 40 % (ref 34–46.6)
HCV AB S/CO SERPL IA: <0.1 S/CO RATIO (ref 0–0.9)
HDLC SERPL-MCNC: 55 MG/DL
HGB BLD-MCNC: 13.2 G/DL (ref 11.1–15.9)
IMM GRANULOCYTES # BLD: 0 X10E3/UL (ref 0–0.1)
IMM GRANULOCYTES NFR BLD: 0 %
INTERPRETATION, 910389: NORMAL
LDLC SERPL CALC-MCNC: 157 MG/DL (ref 0–99)
LYMPHOCYTES # BLD AUTO: 2.3 X10E3/UL (ref 0.7–3.1)
LYMPHOCYTES NFR BLD AUTO: 31 %
MCH RBC QN AUTO: 30.6 PG (ref 26.6–33)
MCHC RBC AUTO-ENTMCNC: 33 G/DL (ref 31.5–35.7)
MCV RBC AUTO: 93 FL (ref 79–97)
MONOCYTES # BLD AUTO: 0.3 X10E3/UL (ref 0.1–0.9)
MONOCYTES NFR BLD AUTO: 4 %
NEUTROPHILS # BLD AUTO: 4.2 X10E3/UL (ref 1.4–7)
NEUTROPHILS NFR BLD AUTO: 58 %
PLATELET # BLD AUTO: 292 X10E3/UL (ref 150–379)
POTASSIUM SERPL-SCNC: 4.2 MMOL/L (ref 3.5–5.2)
PROT SERPL-MCNC: 8 G/DL (ref 6–8.5)
RBC # BLD AUTO: 4.31 X10E6/UL (ref 3.77–5.28)
SODIUM SERPL-SCNC: 141 MMOL/L (ref 134–144)
TRIGL SERPL-MCNC: 187 MG/DL (ref 0–149)
VLDLC SERPL CALC-MCNC: 37 MG/DL (ref 5–40)
WBC # BLD AUTO: 7.3 X10E3/UL (ref 3.4–10.8)

## 2018-05-03 NOTE — PROGRESS NOTES
Annual Wellness Visit; Hypertension; Labs; and Allergic Rhinitis (seasonal allergies about two weeks)       Subjective:   HPI     63year old presents for annual physical exam, Pap smear and labs and c/o allergy symptoms. Hypertension Review:  The patient has essential hypertension. BP improved. Diet and Lifestyle: generally follows a  low sodium diet, exercises sporadically  Home BP Monitoring: is not measured at home. Pertinent ROS: taking medications as instructed, no medication side effects noted, no TIA's, no chest pain on exertion, no dyspnea on exertion, no swelling of ankles. Past Medical History:   Diagnosis Date    Blindness of left eye     Hypertension        Past Surgical History:   Procedure Laterality Date    HX GYN         Prior to Admission medications    Medication Sig Start Date End Date Taking? Authorizing Provider   azelastine (OPTIVAR) 0.05 % ophthalmic solution Administer 1 Drop to both eyes two (2) times a day. Use in affected eye(s) 5/2/18  Yes Ruth Lopez NP   lisinopril-hydroCHLOROthiazide (PRINZIDE, ZESTORETIC) 10-12.5 mg per tablet Take 1 Tab by mouth daily. 4/24/18  Yes Bianka Mcrae NP        No Active Allergies     Social History     Social History    Marital status: UNKNOWN     Spouse name: N/A    Number of children: N/A    Years of education: N/A     Occupational History    Not on file.      Social History Main Topics    Smoking status: Former Smoker     Packs/day: 3.00    Smokeless tobacco: Never Used      Comment: smoker for 17 years; 3 cigs a day    Alcohol use 0.6 oz/week     1 Glasses of wine per week      Comment: social    Drug use: No    Sexual activity: No     Other Topics Concern    Not on file     Social History Narrative        Family History   Problem Relation Age of Onset    Hypertension Mother     Hypertension Father     Hypertension Sister     Hypertension Brother           Review of Systems   Constitutional: Negative for chills, diaphoresis, fever, malaise/fatigue and weight loss. HENT: Negative for congestion, ear discharge, ear pain, hearing loss, nosebleeds, sinus pain, sore throat and tinnitus. Sneezing and rhinitis. Eyes: Positive for discharge. Negative for blurred vision and pain. Watery, itchy eyes   Respiratory: Negative for cough, shortness of breath and wheezing. Cardiovascular: Negative for chest pain and palpitations. Gastrointestinal: Negative for nausea and vomiting. Genitourinary: Negative for dysuria and flank pain. Musculoskeletal: Negative for myalgias. Skin: Negative for rash. Neurological: Negative for dizziness, weakness and headaches. Psychiatric/Behavioral: Negative for depression. Objective:     Vitals:    05/02/18 1032   BP: 134/87   Pulse: 74   Resp: 16   Temp: 98.2 °F (36.8 °C)   TempSrc: Oral   SpO2: 97%   Weight: 202 lb 8 oz (91.9 kg)   Height: 5' 6\" (1.676 m)   PainSc:   0 - No pain        Physical Exam   Constitutional: She is oriented to person, place, and time. She appears well-nourished. HENT:   Head: Normocephalic and atraumatic. Right Ear: External ear normal.   Left Ear: External ear normal.   Nose: Nose normal.   Mouth/Throat: Oropharynx is clear and moist. No oropharyngeal exudate. Eyes: Conjunctivae are normal. Right eye exhibits discharge. Left eye exhibits discharge. Blindness left eye    Watery discharge   Neck: Normal range of motion. Neck supple. No thyromegaly present. Cardiovascular: Normal rate, regular rhythm, normal heart sounds and intact distal pulses. Pulmonary/Chest: Effort normal and breath sounds normal. No respiratory distress. She has no wheezes. She has no rales. She exhibits no tenderness. Abdominal: Soft. Bowel sounds are normal. She exhibits no distension and no mass. There is no tenderness. Genitourinary: Uterus normal. No vaginal discharge found.    Genitourinary Comments: Vulvovaginal masses or growths to the perineum and vaginal introtis, making it difficult to perform Pap smear. Patient has a hx of hemmrhoids    Musculoskeletal: Normal range of motion. Lymphadenopathy:     She has no cervical adenopathy. Neurological: She is alert and oriented to person, place, and time. She has normal reflexes. She displays normal reflexes. No cranial nerve deficit. She exhibits normal muscle tone. Coordination normal.   Skin: Skin is warm and dry. No rash noted. No erythema. Psychiatric: She has a normal mood and affect. Nursing note and vitals reviewed. Assessment/ Plan:       ICD-10-CM ICD-9-CM    1. Annual physical exam Z00.00 V70.0 HEPATITIS C AB      METABOLIC PANEL, COMPREHENSIVE      LIPID PANEL      CBC WITH AUTOMATED DIFF      HEMOGLOBIN A1C WITH EAG      VITAMIN D, 25 HYDROXY      NANCY MAMMO BI SCREENING INCL CAD      OCCULT BLOOD, IMMUNOASSAY (FIT)      REFERRAL TO GASTROENTEROLOGY      AMB POC URINALYSIS DIP STICK AUTO W/O MICRO   2. Essential hypertension G60 688.4 METABOLIC PANEL, COMPREHENSIVE   3. Healthcare maintenance Z00.00 V70.0 HEPATITIS C AB      METABOLIC PANEL, COMPREHENSIVE      CBC WITH AUTOMATED DIFF      HEMOGLOBIN A1C WITH EAG      VITAMIN D, 25 HYDROXY      OCCULT BLOOD, IMMUNOASSAY (FIT)      REFERRAL TO GASTROENTEROLOGY      AMB POC URINALYSIS DIP STICK AUTO W/O MICRO   4. Screening for diabetes mellitus Z13.1 V77.1 HEMOGLOBIN A1C WITH EAG   5. Screen for colon cancer Z12.11 V76.51 OCCULT BLOOD, IMMUNOASSAY (FIT)      REFERRAL TO GASTROENTEROLOGY   6. Screening for cholesterol level Z13.220 V77.91 LIPID PANEL   7. Need for hepatitis C screening test Z11.59 V73.89 HEPATITIS C AB   8. Screening for breast cancer Z12.31 V76.10 NANCY MAMMO BI SCREENING INCL CAD   9. Vaginal mass N89.9 625.8 REFERRAL TO OBSTETRICS AND GYNECOLOGY   10.  Allergic conjunctivitis of both eyes H10.13 372.14 azelastine (OPTIVAR) 0.05 % ophthalmic solution        Orders Placed This Encounter    NANCY MAMMO BI SCREENING INCL CAD Standing Status:   Future     Standing Expiration Date:   10/31/2018     Order Specific Question:   Reason for Exam     Answer:   screening    HEPATITIS C AB    METABOLIC PANEL, COMPREHENSIVE    LIPID PANEL    CBC WITH AUTOMATED DIFF    HEMOGLOBIN A1C WITH EAG    VITAMIN D, 25 HYDROXY    OCCULT BLOOD, IMMUNOASSAY (FIT)    REFERRAL TO GASTROENTEROLOGY     Referral Priority:   Routine     Referral Type:   Consultation     Referral Reason:   Specialty Services Required     Referral Location:   [facility]     Referred to Provider:   Pricilla Nicholson MD     Requested Specialty:   Gastroenterology   [facility] AT [address]     Referral Priority:   Routine     Referral Type:   Consultation     Referral Reason:   Specialty Services Required     Referral Location:   [facility]     Referred to Provider:   Michael Duggan MD     Requested Specialty:   Obstetrics & Gynecology    AMB POC URINALYSIS DIP STICK AUTO W/O MICRO    azelastine (OPTIVAR) 0.05 % ophthalmic solution     Sig: Administer 1 Drop to both eyes two (2) times a day. Use in affected eye(s)     Dispense:  6 mL     Refill:  0        I have reviewed the patient's medical history in detail and updated the computerized patient record. Referral to GYN to complete Pap smear and evaluation of vulvovaginal masses/growths. We had a prolonged discussion about these complex clinical issues and went over the various important aspects to consider. All questions were answered. Advised her to call back, return to office, or go to ER if symptoms do not improve, change in nature, or persist, otherwise schedule f/u in 2 wks to discuss tests results and check BP. She was given an after visit summary or informed of [employer] [facility] which includes patient instructions, diagnoses, current medications, & vitals.     She expressed understanding with the diagnosis and plan and was given the opportunity to ask questions.     Isacc Tang, DNP

## 2019-05-20 RX ORDER — LISINOPRIL AND HYDROCHLOROTHIAZIDE 10; 12.5 MG/1; MG/1
TABLET ORAL
Qty: 90 TAB | Refills: 0 | Status: SHIPPED | OUTPATIENT
Start: 2019-05-20 | End: 2019-11-19 | Stop reason: SDUPTHER

## 2019-08-01 ENCOUNTER — HOSPITAL ENCOUNTER (EMERGENCY)
Age: 59
Discharge: HOME | End: 2019-08-01
Attending: EMERGENCY MEDICINE
Payer: COMMERCIAL

## 2019-08-01 ENCOUNTER — APPOINTMENT (OUTPATIENT)
Dept: GENERAL RADIOLOGY | Age: 59
End: 2019-08-01
Attending: PHYSICIAN ASSISTANT
Payer: COMMERCIAL

## 2019-08-01 VITALS
RESPIRATION RATE: 16 BRPM | BODY MASS INDEX: 32.91 KG/M2 | SYSTOLIC BLOOD PRESSURE: 198 MMHG | WEIGHT: 209.66 LBS | HEART RATE: 73 BPM | TEMPERATURE: 97.7 F | HEIGHT: 67 IN | DIASTOLIC BLOOD PRESSURE: 109 MMHG | OXYGEN SATURATION: 99 %

## 2019-08-01 DIAGNOSIS — T07.XXXA ABRASIONS OF MULTIPLE SITES: Primary | ICD-10-CM

## 2019-08-01 DIAGNOSIS — R03.0 ELEVATED BLOOD PRESSURE READING: ICD-10-CM
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
## 2019-08-01 PROCEDURE — 73130 X-RAY EXAM OF HAND: CPT

## 2019-08-01 PROCEDURE — 99283 EMERGENCY DEPT VISIT LOW MDM: CPT

## 2019-08-01 PROCEDURE — 74011250637 HC RX REV CODE- 250/637: Performed by: PHYSICIAN ASSISTANT

## 2019-08-01 RX ORDER — IBUPROFEN 600 MG/1
600 TABLET ORAL
Status: COMPLETED | OUTPATIENT
Start: 2019-08-01 | End: 2019-08-01

## 2019-08-01 RX ORDER — IBUPROFEN 800 MG/1
800 TABLET ORAL
Qty: 30 TAB | Refills: 0 | Status: SHIPPED | OUTPATIENT
Start: 2019-08-01 | End: 2019-08-11

## 2019-08-01 RX ORDER — METHOCARBAMOL 750 MG/1
750 TABLET, FILM COATED ORAL 4 TIMES DAILY
Qty: 20 TAB | Refills: 0 | Status: SHIPPED | OUTPATIENT
Start: 2019-08-01 | End: 2020-04-06

## 2019-08-01 RX ADMIN — IBUPROFEN 600 MG: 600 TABLET, FILM COATED ORAL at 12:33

## 2019-08-01 NOTE — ED NOTES
Discharge instructions given to patient by PA [facility].  Verbalized understanding of instructions. Patient discharged without difficulty. Patient discharged in stable condition ambulatory accompanied by family.
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
## 2019-08-01 NOTE — LETTER
[address] 
[facility] EMERGENCY DEPT 
[address] [facility] [place] [address] 
[phone] Work/School Note Date: 8/1/2019 To Whom It May concern: 
 
Best Baker was seen and treated today in the emergency room by the following provider(s): 
Attending Provider: Gayle Cordon MD 
Physician Assistant: Keshia Slater. Please excuse Best Baker from work today and tomorrow. Sincerely, Keshia Conroy
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
## 2019-11-19 RX ORDER — LISINOPRIL AND HYDROCHLOROTHIAZIDE 10; 12.5 MG/1; MG/1
TABLET ORAL
Qty: 90 TAB | Refills: 0 | Status: SHIPPED | OUTPATIENT
Start: 2019-11-19 | End: 2020-04-06 | Stop reason: SDUPTHER

## 2020-04-06 ENCOUNTER — VIRTUAL VISIT (OUTPATIENT)
Dept: INTERNAL MEDICINE CLINIC | Age: 60
End: 2020-04-06

## 2020-04-06 DIAGNOSIS — I10 ESSENTIAL HYPERTENSION: Primary | ICD-10-CM

## 2020-04-06 DIAGNOSIS — Z76.0 MEDICATION REFILL: ICD-10-CM

## 2020-04-06 RX ORDER — LISINOPRIL AND HYDROCHLOROTHIAZIDE 10; 12.5 MG/1; MG/1
1 TABLET ORAL DAILY
Qty: 90 TAB | Refills: 0 | Status: SHIPPED | OUTPATIENT
Start: 2020-04-06 | End: 2020-07-09

## 2020-04-06 NOTE — PROGRESS NOTES
Chief Complaint   Patient presents with    Medication Refill     1. Have you been to the ER, urgent care clinic since your last visit? Hospitalized since your last visit? No    2. Have you seen or consulted any other health care providers outside of the [address] [place] [address] since your last visit? Include any pap smears or colon screening.  No

## 2020-04-06 NOTE — PROGRESS NOTES
Medication Refill       Subjective:   HPI     \"THIS VISIT WAS COMPLETED VIRTUALLY USING DOXY. ME.\"    62year old MsShila Bernal presents for f/u hypertension and medication refill. Her last visit was 4/2018. It is noted she had an ER visit 8/2019 where her BP was elevated. She denies seeing any other health care provider. She was reminded to schedule an appt for annual exam and advised to self-monitor her BP and record for next visit. Hypertension Review:  The patient has essential hypertension  Diet and Lifestyle: generally follows a  low sodium diet, exercises sporadically  Home BP Monitoring: is not measured at home. Pertinent ROS: taking medications as instructed, no medication side effects noted, no TIA's, no chest pain on exertion, no dyspnea on exertion, no swelling of ankles. Past Medical History:   Diagnosis Date    Blindness of left eye     Hypertension        Past Surgical History:   Procedure Laterality Date    HX GYN         Prior to Admission medications    Medication Sig Start Date End Date Taking? Authorizing Provider   lisinopril-hydroCHLOROthiazide (PRINZIDE, ZESTORETIC) 10-12.5 mg per tablet Take 1 Tab by mouth daily. 4/6/20  Yes Ruth Lopez NP   lisinopril-hydroCHLOROthiazide (PRINZIDE, ZESTORETIC) 10-12.5 mg per tablet TAKE 1 TABLET BY MOUTH ONCE DAILY 11/19/19 4/6/20  Satinder MILIAN NP   methocarbamol (ROBAXIN-750) 750 mg tablet Take 1 Tab by mouth four (4) times daily.  8/1/19 4/6/20  LINO Cerda        No Known Allergies     Social History     Socioeconomic History    Marital status: UNKNOWN     Spouse name: Not on file    Number of children: Not on file    Years of education: Not on file    Highest education level: Not on file   Occupational History    Not on file   Social Needs    Financial resource strain: Not on file    Food insecurity     Worry: Not on file     Inability: Not on file    Transportation needs     Medical: Not on file     Non-medical: Not on file   Tobacco Use    Smoking status: Former Smoker     Packs/day: 3.00    Smokeless tobacco: Never Used    Tobacco comment: smoker for 17 years; 3 cigs a day   Substance and Sexual Activity    Alcohol use: Yes     Alcohol/week: 1.0 standard drinks     Types: 1 Glasses of wine per week     Comment: social    Drug use: No    Sexual activity: Never   Lifestyle    Physical activity     Days per week: Not on file     Minutes per session: Not on file    Stress: Not on file   Relationships    Social connections     Talks on phone: Not on file     Gets together: Not on file     Attends Restorationist service: Not on file     Active member of club or organization: Not on file     Attends meetings of clubs or organizations: Not on file     Relationship status: Not on file    Intimate partner violence     Fear of current or ex partner: Not on file     Emotionally abused: Not on file     Physically abused: Not on file     Forced sexual activity: Not on file   Other Topics Concern    Not on file   Social History Narrative    Not on file        Family History   Problem Relation Age of Onset    Hypertension Mother     Hypertension Father     Hypertension Sister     Hypertension Brother           Review of Systems   Constitutional: Negative for chills, diaphoresis, fever, malaise/fatigue and weight loss. HENT: Negative for congestion, ear discharge, ear pain, nosebleeds, sinus pain and sore throat. Eyes: Negative for blurred vision, double vision, photophobia, pain, discharge and redness. Respiratory: Negative for cough, shortness of breath and wheezing. Cardiovascular: Negative for chest pain, palpitations and leg swelling. Gastrointestinal: Negative for abdominal pain, constipation, diarrhea, heartburn, nausea and vomiting. Genitourinary: Negative for dysuria, flank pain, frequency and urgency. Musculoskeletal: Negative for myalgias.    Neurological: Negative for dizziness, tingling, tremors, sensory change, speech change, focal weakness, weakness and headaches. Endo/Heme/Allergies: Negative for polydipsia. Does not bruise/bleed easily. Psychiatric/Behavioral: Negative for depression. Objective:     Vitals:    04/06/20 0814   PainSc:   0 - No pain        Physical Exam  Nursing note reviewed. Constitutional:       General: She is not in acute distress. Appearance: Normal appearance. She is obese. She is not ill-appearing or diaphoretic. HENT:      Head: Normocephalic and atraumatic. Right Ear: External ear normal.      Left Ear: External ear normal.      Nose: No congestion or rhinorrhea. Mouth/Throat:      Pharynx: No oropharyngeal exudate or posterior oropharyngeal erythema. Eyes:      General:         Right eye: No discharge. Left eye: No discharge. Conjunctiva/sclera: Conjunctivae normal.   Neck:      Musculoskeletal: Normal range of motion and neck supple. Musculoskeletal: Normal range of motion. General: No swelling or tenderness. Right lower leg: No edema. Left lower leg: No edema. Skin:     Coloration: Skin is not jaundiced or pale. Findings: No bruising, erythema, lesion or rash. Neurological:      Mental Status: She is alert and oriented to person, place, and time. Psychiatric:         Mood and Affect: Mood normal.          Assessment/ Plan:       ICD-10-CM ICD-9-CM    1. Essential hypertension I10 401.9    2. Medication refill Z76.0 V68.1         Orders Placed This Encounter    lisinopril-hydroCHLOROthiazide (PRINZIDE, ZESTORETIC) 10-12.5 mg per tablet     Sig: Take 1 Tab by mouth daily. Dispense:  90 Tab     Refill:  0        I have reviewed the patient's medical history in detail and updated the computerized patient record. Medication refilled. Educated low salt diet, weight loss and increased physical activity. Encouraged to self-monitor her BP  and record for next visit.     We had a prolonged discussion about these complex clinical issues and went over the various important aspects to consider. All questions were answered. Advised her to call back or return to office if symptoms do not improve, change in nature, or persist, otherwise schedule appt in about a month for f/u hypertension, annual physical exam and labs. She was given an after visit summary or informed of [facility] [facility] which includes patient instructions, diagnoses, current medications, & vitals. She expressed understanding with the diagnosis and plan and was given the opportunity to ask questions.     Beto Awan, DNP

## 2020-06-28 DIAGNOSIS — I10 ESSENTIAL HYPERTENSION: Primary | ICD-10-CM

## 2020-06-28 DIAGNOSIS — Z76.0 MEDICATION REFILL: ICD-10-CM

## 2020-07-09 RX ORDER — LISINOPRIL AND HYDROCHLOROTHIAZIDE 10; 12.5 MG/1; MG/1
TABLET ORAL
Qty: 90 TAB | Refills: 0 | Status: SHIPPED | OUTPATIENT
Start: 2020-07-09 | End: 2020-10-14 | Stop reason: SDUPTHER

## 2020-07-09 NOTE — TELEPHONE ENCOUNTER
Patient's request for med refill (lisinopril-hydrochlorathiazide) was approved an sent to the pharmacy on file. Patient to schedule an appointment to be seen for hypertension management. Last recorded blood pressure was 198/105 on Aug 1,  2019.

## 2020-10-14 ENCOUNTER — VIRTUAL VISIT (OUTPATIENT)
Dept: INTERNAL MEDICINE CLINIC | Age: 60
End: 2020-10-14
Payer: COMMERCIAL

## 2020-10-14 DIAGNOSIS — I10 ESSENTIAL HYPERTENSION: Primary | ICD-10-CM

## 2020-10-14 DIAGNOSIS — Z76.0 MEDICATION REFILL: ICD-10-CM

## 2020-10-14 PROCEDURE — 99201 PR OFFICE OUTPATIENT NEW 10 MINUTES: CPT | Performed by: NURSE PRACTITIONER

## 2020-10-14 RX ORDER — LISINOPRIL AND HYDROCHLOROTHIAZIDE 10; 12.5 MG/1; MG/1
TABLET ORAL
Qty: 30 TAB | Refills: 0 | Status: SHIPPED | OUTPATIENT
Start: 2020-10-14 | End: 2020-11-19 | Stop reason: SDUPTHER

## 2020-10-14 NOTE — PATIENT INSTRUCTIONS
High Blood Pressure: Care Instructions Overview It's normal for blood pressure to go up and down throughout the day. But if it stays up, you have high blood pressure. Another name for high blood pressure is hypertension. Despite what a lot of people think, high blood pressure usually doesn't cause headaches or make you feel dizzy or lightheaded. It usually has no symptoms. But it does increase your risk of stroke, heart attack, and other problems. You and your doctor will talk about your risks of these problems based on your blood pressure. Your doctor will give you a goal for your blood pressure. Your goal will be based on your health and your age. Lifestyle changes, such as eating healthy and being active, are always important to help lower blood pressure. You might also take medicine to reach your blood pressure goal. 
Follow-up care is a key part of your treatment and safety. Be sure to make and go to all appointments, and call your doctor if you are having problems. It's also a good idea to know your test results and keep a list of the medicines you take. How can you care for yourself at home? Medical treatment · If you stop taking your medicine, your blood pressure will go back up. You may take one or more types of medicine to lower your blood pressure. Be safe with medicines. Take your medicine exactly as prescribed. Call your doctor if you think you are having a problem with your medicine. · Talk to your doctor before you start taking aspirin every day. Aspirin can help certain people lower their risk of a heart attack or stroke. But taking aspirin isn't right for everyone, because it can cause serious bleeding. · See your doctor regularly. You may need to see the doctor more often at first or until your blood pressure comes down. · If you are taking blood pressure medicine, talk to your doctor before you take decongestants or anti-inflammatory medicine, such as ibuprofen. Some of these medicines can raise blood pressure. · Learn how to check your blood pressure at home. Lifestyle changes · Stay at a healthy weight. This is especially important if you put on weight around the waist. Losing even 10 pounds can help you lower your blood pressure. · If your doctor recommends it, get more exercise. Walking is a good choice. Bit by bit, increase the amount you walk every day. Try for at least 30 minutes on most days of the week. You also may want to swim, bike, or do other activities. · Avoid or limit alcohol. Talk to your doctor about whether you can drink any alcohol. · Try to limit how much sodium you eat to less than 2,300 milligrams (mg) a day. Your doctor may ask you to try to eat less than 1,500 mg a day. · Eat plenty of fruits (such as bananas and oranges), vegetables, legumes, whole grains, and low-fat dairy products. · Lower the amount of saturated fat in your diet. Saturated fat is found in animal products such as milk, cheese, and meat. Limiting these foods may help you lose weight and also lower your risk for heart disease. · Do not smoke. Smoking increases your risk for heart attack and stroke. If you need help quitting, talk to your doctor about stop-smoking programs and medicines. These can increase your chances of quitting for good. When should you call for help? Call  911 anytime you think you may need emergency care. This may mean having symptoms that suggest that your blood pressure is causing a serious heart or blood vessel problem. Your blood pressure may be over 180/120. For example, call 911 if: 
  · You have symptoms of a heart attack. These may include: 
? Chest pain or pressure, or a strange feeling in the chest. 
? Sweating. ? Shortness of breath. ? Nausea or vomiting. ? Pain, pressure, or a strange feeling in the back, neck, jaw, or upper belly or in one or both shoulders or arms. ? Lightheadedness or sudden weakness. ? A fast or irregular heartbeat.  
  · You have symptoms of a stroke. These may include: 
? Sudden numbness, tingling, weakness, or loss of movement in your face, arm, or leg, especially on only one side of your body. ? Sudden vision changes. ? Sudden trouble speaking. ? Sudden confusion or trouble understanding simple statements. ? Sudden problems with walking or balance. ? A sudden, severe headache that is different from past headaches.  
  · You have severe back or belly pain. Do not wait until your blood pressure comes down on its own. Get help right away. Call your doctor now or seek immediate care if: 
  · Your blood pressure is much higher than normal (such as 180/120 or higher), but you don't have symptoms.  
  · You think high blood pressure is causing symptoms, such as: 
? Severe headache. 
? Blurry vision. Watch closely for changes in your health, and be sure to contact your doctor if: 
  · Your blood pressure measures higher than your doctor recommends at least 2 times. That means the top number is higher or the bottom number is higher, or both.  
  · You think you may be having side effects from your blood pressure medicine. Where can you learn more? Go to http://lidia-glenis.info/ Enter G741 in the search box to learn more about \"High Blood Pressure: Care Instructions. \" Current as of: December 16, 2019               Content Version: 12.6 © 9445-8478 [employer]. Care instructions adapted under license by [employer] (which disclaims liability or warranty for this information). If you have questions about a medical condition or this instruction, always ask your healthcare professional. [place] [address] any warranty or liability for your use of this information. Medication Refill: Care Instructions Your Care Instructions Medicines are a big part of treatment for many health problems.  So it can be upsetting to run out of your medicine. It may even be dangerous to stop a medicine suddenly. The doctor may have given you enough medicine to help you until you can see your regular doctor. The doctor has checked you carefully, but problems can develop later. If you notice any problems or new symptoms, get medical treatment right away. Follow-up care is a key part of your treatment and safety. Be sure to make and go to all appointments, and call your doctor if you are having problems. It's also a good idea to know your test results and keep a list of the medicines you take. How can you care for yourself at home? · Be safe with medicines. Take your medicines exactly as prescribed. · Know when you will run out of your medicine. Use a calendar to remind you to get a refill. Don't wait until you have only a few pills left. · Talk with your doctor or pharmacist about your medicine. Find out what to do if you miss a dose. When should you call for help? Call your doctor now or seek immediate medical care if: 
  · You have problems with your medicine. Watch closely for changes in your health, and be sure to contact your doctor if you have any problems. Where can you learn more? Go to http://www.gray.com/ Enter K326 in the search box to learn more about \"Medication Refill: Care Instructions. \" Current as of: August 22, 2019               Content Version: 12.6 © 6977-8971 [employer]. Care instructions adapted under license by [facility] (which disclaims liability or warranty for this information). If you have questions about a medical condition or this instruction, always ask your healthcare professional. [address] any warranty or liability for your use of this information.

## 2020-10-14 NOTE — PROGRESS NOTES
Chief Complaint   Patient presents with    Hypertension    Medication Evaluation     1. Have you been to the ER, urgent care clinic since your last visit? Hospitalized since your last visit? No    2. Have you seen or consulted any other health care providers outside of the [address] [place] [address] since your last visit? Include any pap smears or colon screening.  No

## 2020-11-19 ENCOUNTER — OFFICE VISIT (OUTPATIENT)
Dept: INTERNAL MEDICINE CLINIC | Age: 60
End: 2020-11-19
Payer: COMMERCIAL

## 2020-11-19 VITALS
OXYGEN SATURATION: 98 % | RESPIRATION RATE: 16 BRPM | BODY MASS INDEX: 31.71 KG/M2 | HEART RATE: 87 BPM | TEMPERATURE: 98.4 F | DIASTOLIC BLOOD PRESSURE: 76 MMHG | WEIGHT: 202 LBS | HEIGHT: 67 IN | SYSTOLIC BLOOD PRESSURE: 162 MMHG

## 2020-11-19 DIAGNOSIS — Z12.31 ENCOUNTER FOR SCREENING MAMMOGRAM FOR MALIGNANT NEOPLASM OF BREAST: ICD-10-CM

## 2020-11-19 DIAGNOSIS — R63.5 UNINTENDED WEIGHT GAIN: ICD-10-CM

## 2020-11-19 DIAGNOSIS — I10 ESSENTIAL HYPERTENSION: ICD-10-CM

## 2020-11-19 DIAGNOSIS — Z76.89 ESTABLISHING CARE WITH NEW DOCTOR, ENCOUNTER FOR: Primary | ICD-10-CM

## 2020-11-19 DIAGNOSIS — Z76.0 MEDICATION REFILL: ICD-10-CM

## 2020-11-19 DIAGNOSIS — E78.2 MIXED HYPERLIPIDEMIA: ICD-10-CM

## 2020-11-19 DIAGNOSIS — Z12.11 SCREEN FOR COLON CANCER: ICD-10-CM

## 2020-11-19 DIAGNOSIS — E55.9 VITAMIN D DEFICIENCY: ICD-10-CM

## 2020-11-19 LAB
25(OH)D3 SERPL-MCNC: <9 NG/ML (ref 30–100)
ALBUMIN SERPL-MCNC: 4 G/DL (ref 3.5–5)
ALBUMIN/GLOB SERPL: 1.1 {RATIO} (ref 1.1–2.2)
ALP SERPL-CCNC: 89 U/L (ref 45–117)
ALT SERPL-CCNC: 19 U/L (ref 12–78)
ANION GAP SERPL CALC-SCNC: 5 MMOL/L (ref 5–15)
AST SERPL-CCNC: 12 U/L (ref 15–37)
BILIRUB SERPL-MCNC: 0.5 MG/DL (ref 0.2–1)
BILIRUB UR QL STRIP: NEGATIVE
BUN SERPL-MCNC: 10 MG/DL (ref 6–20)
BUN/CREAT SERPL: 13 (ref 12–20)
CALCIUM SERPL-MCNC: 9.4 MG/DL (ref 8.5–10.1)
CHLORIDE SERPL-SCNC: 107 MMOL/L (ref 97–108)
CHOLEST SERPL-MCNC: 229 MG/DL
CO2 SERPL-SCNC: 27 MMOL/L (ref 21–32)
CREAT SERPL-MCNC: 0.77 MG/DL (ref 0.55–1.02)
ERYTHROCYTE [DISTWIDTH] IN BLOOD BY AUTOMATED COUNT: 12.7 % (ref 11.5–14.5)
GLOBULIN SER CALC-MCNC: 3.6 G/DL (ref 2–4)
GLUCOSE POC: 104 MG/DL
GLUCOSE SERPL-MCNC: 101 MG/DL (ref 65–100)
GLUCOSE UR-MCNC: NEGATIVE MG/DL
HBA1C MFR BLD HPLC: 5.3 %
HCT VFR BLD AUTO: 42.7 % (ref 35–47)
HDLC SERPL-MCNC: 62 MG/DL
HGB BLD-MCNC: 13.8 G/DL (ref 11.5–16)
KETONES P FAST UR STRIP-MCNC: NEGATIVE MG/DL
LDL CHOLESTEROL POC: 140 MG/DL
MCH RBC QN AUTO: 31.7 PG (ref 26–34)
MCHC RBC AUTO-ENTMCNC: 32.3 G/DL (ref 30–36.5)
MCV RBC AUTO: 98.2 FL (ref 80–99)
NON-HDL GOAL (POC): 167
NRBC # BLD: 0 K/UL (ref 0–0.01)
NRBC BLD-RTO: 0 PER 100 WBC
PH UR STRIP: 5 [PH] (ref 4.6–8)
PLATELET # BLD AUTO: 281 K/UL (ref 150–400)
PMV BLD AUTO: 9.5 FL (ref 8.9–12.9)
POTASSIUM SERPL-SCNC: 3.9 MMOL/L (ref 3.5–5.1)
PROT SERPL-MCNC: 7.6 G/DL (ref 6.4–8.2)
PROT UR QL STRIP: NEGATIVE
RBC # BLD AUTO: 4.35 M/UL (ref 3.8–5.2)
SODIUM SERPL-SCNC: 139 MMOL/L (ref 136–145)
SP GR UR STRIP: 1.03 (ref 1–1.03)
TCHOL/HDL RATIO (POC): 3.7
TRIGL SERPL-MCNC: 137 MG/DL
UA UROBILINOGEN AMB POC: NORMAL (ref 0.2–1)
URINALYSIS CLARITY POC: CLEAR
URINALYSIS COLOR POC: YELLOW
URINE BLOOD POC: NEGATIVE
URINE LEUKOCYTES POC: NEGATIVE
URINE NITRITES POC: NEGATIVE
WBC # BLD AUTO: 6.1 K/UL (ref 3.6–11)

## 2020-11-19 PROCEDURE — 99215 OFFICE O/P EST HI 40 MIN: CPT | Performed by: NURSE PRACTITIONER

## 2020-11-19 PROCEDURE — 80061 LIPID PANEL: CPT | Performed by: NURSE PRACTITIONER

## 2020-11-19 PROCEDURE — 82962 GLUCOSE BLOOD TEST: CPT | Performed by: NURSE PRACTITIONER

## 2020-11-19 PROCEDURE — 83036 HEMOGLOBIN GLYCOSYLATED A1C: CPT | Performed by: NURSE PRACTITIONER

## 2020-11-19 PROCEDURE — 81003 URINALYSIS AUTO W/O SCOPE: CPT | Performed by: NURSE PRACTITIONER

## 2020-11-19 RX ORDER — LISINOPRIL AND HYDROCHLOROTHIAZIDE 10; 12.5 MG/1; MG/1
TABLET ORAL
Qty: 30 TAB | Refills: 0 | Status: SHIPPED | OUTPATIENT
Start: 2020-11-19 | End: 2020-12-03 | Stop reason: SDUPTHER

## 2020-11-19 NOTE — PATIENT INSTRUCTIONS
High Cholesterol: Care Instructions Your Care Instructions Cholesterol is a type of fat in your blood. It is needed for many body functions, such as making new cells. Cholesterol is made by your body. It also comes from food you eat. High cholesterol means that you have too much of the fat in your blood. This raises your risk of a heart attack and stroke. LDL and HDL are part of your total cholesterol. LDL is the \"bad\" cholesterol. High LDL can raise your risk for heart disease, heart attack, and stroke. HDL is the \"good\" cholesterol. It helps clear bad cholesterol from the body. High HDL is linked with a lower risk of heart disease, heart attack, and stroke. Your cholesterol levels help your doctor find out your risk for having a heart attack or stroke. You and your doctor can talk about whether you need to lower your risk and what treatment is best for you. A heart-healthy lifestyle along with medicines can help lower your cholesterol and your risk. The way you choose to lower your risk will depend on how high your risk is for heart attack and stroke. It will also depend on how you feel about taking medicines. Follow-up care is a key part of your treatment and safety. Be sure to make and go to all appointments, and call your doctor if you are having problems. It's also a good idea to know your test results and keep a list of the medicines you take. How can you care for yourself at home? · Eat a variety of foods every day. Good choices include fruits, vegetables, whole grains (like oatmeal), dried beans and peas, nuts and seeds, soy products (like tofu), and fat-free or low-fat dairy products. · Replace butter, margarine, and hydrogenated or partially hydrogenated oils with olive and canola oils. (Canola oil margarine without trans fat is fine.) · Replace red meat with fish, poultry, and soy protein (like tofu). · Limit processed and packaged foods like chips, crackers, and cookies. · Bake, broil, or steam foods. Don't riley them. · Be physically active. Get at least 30 minutes of exercise on most days of the week. Walking is a good choice. You also may want to do other activities, such as running, swimming, cycling, or playing tennis or team sports. · Stay at a healthy weight or lose weight by making the changes in eating and physical activity listed above. Losing just a small amount of weight, even 5 to 10 pounds, can reduce your risk for having a heart attack or stroke. · Do not smoke. When should you call for help? Watch closely for changes in your health, and be sure to contact your doctor if: 
  · You need help making lifestyle changes.  
  · You have questions about your medicine. Where can you learn more? Go to http://www.gray.com/ Enter B945 in the search box to learn more about \"High Cholesterol: Care Instructions. \" Current as of: December 16, 2019               Content Version: 12.6 © 7863-6873 [employer]. Care instructions adapted under license by [employer] (which disclaims liability or warranty for this information). If you have questions about a medical condition or this instruction, always ask your healthcare professional. [address] any warranty or liability for your use of this information. High Blood Pressure: Care Instructions Overview It's normal for blood pressure to go up and down throughout the day. But if it stays up, you have high blood pressure. Another name for high blood pressure is hypertension. Despite what a lot of people think, high blood pressure usually doesn't cause headaches or make you feel dizzy or lightheaded. It usually has no symptoms. But it does increase your risk of stroke, heart attack, and other problems. You and your doctor will talk about your risks of these problems based on your blood pressure. Your doctor will give you a goal for your blood pressure. Your goal will be based on your health and your age. Lifestyle changes, such as eating healthy and being active, are always important to help lower blood pressure. You might also take medicine to reach your blood pressure goal. 
Follow-up care is a key part of your treatment and safety. Be sure to make and go to all appointments, and call your doctor if you are having problems. It's also a good idea to know your test results and keep a list of the medicines you take. How can you care for yourself at home? Medical treatment · If you stop taking your medicine, your blood pressure will go back up. You may take one or more types of medicine to lower your blood pressure. Be safe with medicines. Take your medicine exactly as prescribed. Call your doctor if you think you are having a problem with your medicine. · Talk to your doctor before you start taking aspirin every day. Aspirin can help certain people lower their risk of a heart attack or stroke. But taking aspirin isn't right for everyone, because it can cause serious bleeding. · See your doctor regularly. You may need to see the doctor more often at first or until your blood pressure comes down. · If you are taking blood pressure medicine, talk to your doctor before you take decongestants or anti-inflammatory medicine, such as ibuprofen. Some of these medicines can raise blood pressure. · Learn how to check your blood pressure at home. Lifestyle changes · Stay at a healthy weight. This is especially important if you put on weight around the waist. Losing even 10 pounds can help you lower your blood pressure. · If your doctor recommends it, get more exercise. Walking is a good choice. Bit by bit, increase the amount you walk every day. Try for at least 30 minutes on most days of the week. You also may want to swim, bike, or do other activities. · Avoid or limit alcohol. Talk to your doctor about whether you can drink any alcohol. · Try to limit how much sodium you eat to less than 2,300 milligrams (mg) a day. Your doctor may ask you to try to eat less than 1,500 mg a day. · Eat plenty of fruits (such as bananas and oranges), vegetables, legumes, whole grains, and low-fat dairy products. · Lower the amount of saturated fat in your diet. Saturated fat is found in animal products such as milk, cheese, and meat. Limiting these foods may help you lose weight and also lower your risk for heart disease. · Do not smoke. Smoking increases your risk for heart attack and stroke. If you need help quitting, talk to your doctor about stop-smoking programs and medicines. These can increase your chances of quitting for good. When should you call for help? Call  911 anytime you think you may need emergency care. This may mean having symptoms that suggest that your blood pressure is causing a serious heart or blood vessel problem. Your blood pressure may be over 180/120. For example, call 911 if: 
  · You have symptoms of a heart attack. These may include: 
? Chest pain or pressure, or a strange feeling in the chest. 
? Sweating. ? Shortness of breath. ? Nausea or vomiting. ? Pain, pressure, or a strange feeling in the back, neck, jaw, or upper belly or in one or both shoulders or arms. ? Lightheadedness or sudden weakness. ? A fast or irregular heartbeat.  
  · You have symptoms of a stroke. These may include: 
? Sudden numbness, tingling, weakness, or loss of movement in your face, arm, or leg, especially on only one side of your body. ? Sudden vision changes. ? Sudden trouble speaking. ? Sudden confusion or trouble understanding simple statements. ? Sudden problems with walking or balance. ? A sudden, severe headache that is different from past headaches.  
  · You have severe back or belly pain. Do not wait until your blood pressure comes down on its own. Get help right away. Call your doctor now or seek immediate care if: 
  · Your blood pressure is much higher than normal (such as 180/120 or higher), but you don't have symptoms.  
  · You think high blood pressure is causing symptoms, such as: 
? Severe headache. 
? Blurry vision. Watch closely for changes in your health, and be sure to contact your doctor if: 
  · Your blood pressure measures higher than your doctor recommends at least 2 times. That means the top number is higher or the bottom number is higher, or both.  
  · You think you may be having side effects from your blood pressure medicine. Where can you learn more? Go to http://www.gray.com/ Enter U583 in the search box to learn more about \"High Blood Pressure: Care Instructions. \" Current as of: December 16, 2019               Content Version: 12.6 © 3091-2820 [employer]. Care instructions adapted under license by [employer] (which disclaims liability or warranty for this information). If you have questions about a medical condition or this instruction, always ask your healthcare professional. [place] [address] any warranty or liability for your use of this information. Low Sodium Diet (2,000 Milligram): Care Instructions Your Care Instructions Too much sodium causes your body to hold on to extra water. This can raise your blood pressure and force your heart and kidneys to work harder. In very serious cases, this could cause you to be put in the hospital. It might even be life-threatening. By limiting sodium, you will feel better and lower your risk of serious problems. The most common source of sodium is salt. People get most of the salt in their diet from canned, prepared, and packaged foods. Fast food and restaurant meals also are very high in sodium.  Your doctor will probably limit your sodium to less than 2,000 milligrams (mg) a day. This limit counts all the sodium in prepared and packaged foods and any salt you add to your food. Follow-up care is a key part of your treatment and safety. Be sure to make and go to all appointments, and call your doctor if you are having problems. It's also a good idea to know your test results and keep a list of the medicines you take. How can you care for yourself at home? Read food labels · Read labels on cans and food packages. The labels tell you how much sodium is in each serving. Make sure that you look at the serving size. If you eat more than the serving size, you have eaten more sodium. · Food labels also tell you the Percent Daily Value for sodium. Choose products with low Percent Daily Values for sodium. · Be aware that sodium can come in forms other than salt, including monosodium glutamate (MSG), sodium citrate, and sodium bicarbonate (baking soda). MSG is often added to Asian food. When you eat out, you can sometimes ask for food without MSG or added salt. Buy low-sodium foods · Buy foods that are labeled \"unsalted\" (no salt added), \"sodium-free\" (less than 5 mg of sodium per serving), or \"low-sodium\" (less than 140 mg of sodium per serving). Foods labeled \"reduced-sodium\" and \"light sodium\" may still have too much sodium. Be sure to read the label to see how much sodium you are getting. · Buy fresh vegetables, or frozen vegetables without added sauces. Buy low-sodium versions of canned vegetables, soups, and other canned goods. Prepare low-sodium meals · Cut back on the amount of salt you use in cooking. This will help you adjust to the taste. Do not add salt after cooking. One teaspoon of salt has about 2,300 mg of sodium. · Take the salt shaker off the table. · Flavor your food with garlic, lemon juice, onion, vinegar, herbs, and spices.  Do not use soy sauce, lite soy sauce, steak sauce, onion salt, garlic salt, celery salt, mustard, or ketchup on your food. · Use low-sodium salad dressings, sauces, and ketchup. Or make your own salad dressings and sauces without adding salt. · Use less salt (or none) when recipes call for it. You can often use half the salt a recipe calls for without losing flavor. Other foods such as rice, pasta, and grains do not need added salt. · Rinse canned vegetables, and cook them in fresh water. This removes somebut not allof the salt. · Avoid water that is naturally high in sodium or that has been treated with water softeners, which add sodium. Call your local water company to find out the sodium content of your water supply. If you buy bottled water, read the label and choose a sodium-free brand. Avoid high-sodium foods · Avoid eating: 
? Smoked, cured, salted, and canned meat, fish, and poultry. ? Ham, corrales, hot dogs, and luncheon meats. ? Regular, hard, and processed cheese and regular peanut butter. ? Crackers with salted tops, and other salted snack foods such as pretzels, chips, and salted popcorn. ? Frozen prepared meals, unless labeled low-sodium. ? Canned and dried soups, broths, and bouillon, unless labeled sodium-free or low-sodium. ? Canned vegetables, unless labeled sodium-free or low-sodium. ? [place] Emi fries, pizza, tacos, and other fast foods. ? Pickles, olives, ketchup, and other condiments, especially soy sauce, unless labeled sodium-free or low-sodium. Where can you learn more? Go to http://www.gray.com/ Enter G091 in the search box to learn more about \"Low Sodium Diet (2,000 Milligram): Care Instructions. \" Current as of: August 22, 2019               Content Version: 12.6 © 2792-9392 [employer]. Care instructions adapted under license by [employer] (which disclaims liability or warranty for this information).  If you have questions about a medical condition or this instruction, always ask your healthcare professional. [address] any warranty or liability for your use of this information. Mammogram: About This Test 
What is it? A mammogram is an X-ray of the breast that is used to screen for breast cancer. This test can find tumors that are too small for you or your doctor to feel. Cancer is most easily treated when it is found at an early stage. Why is this test done? A mammogram is done to: 
· Look for breast cancer in women who don't have symptoms. · Find breast cancer in women who have symptoms. Symptoms of breast cancer may include a lump or thickening in the breast, nipple discharge, or dimpling of the skin on one area of the breast. 
· Find an area of suspicious breast tissue to remove for an exam under a microscope (biopsy). How do you prepare for the test? 
If you've had a mammogram before at another clinic, have the results sent or bring them with you to your appointment. On the day of the mammogram, don't use any deodorant. And don't use perfume, powders, or ointments near or on your breasts. The residue left on your skin by these substances may interfere with the X-rays. How is the test done? · You will need to take off any jewelry that might interfere with the X-ray pictures. · You will need to take off your clothes above the waist. 
· You will be given a cloth or paper gown to use during the test. 
· You probably will stand during the mammogram. 
· One at a time, your breasts will be placed on a flat plate. · Another plate is then pressed firmly against your breast to help flatten out the breast tissue. You may be asked to lift your arm. · For a few seconds while the X-ray picture is being taken, you will need to hold your breath. · At least two pictures are taken of each breast. One is taken from the top and one from the side. How does having a mammogram feel? A mammogram is often uncomfortable but rarely painful.  If you have sensitive or fragile skin or a skin condition, let the technician know before you have your exam. If you have menstrual periods, the procedure is more comfortable when done within 2 weeks after your period has ended. Having your breasts flattened is usually uncomfortable, but it helps the technician get the best images. How long does the test take? · The test will take about 10 to 15 minutes. You may be in the clinic for up to an hour. · You may be asked to wait a few minutes while the images are checked to make sure they don't need to be redone. What happens after the test? 
· You will probably be able to go home right away. · You can go back to your usual activities right away. Follow-up care is a key part of your treatment and safety. Be sure to make and go to all appointments, and call your doctor if you are having problems. It's also a good idea to keep a list of the medicines you take. Ask your doctor when you can expect to have your test results. Where can you learn more? Go to http://www.gray.com/ Enter I988 in the search box to learn more about \"Mammogram: About This Test.\" Current as of: April 29, 2020               Content Version: 12.6 © 2006-2020 [employer]. Care instructions adapted under license by [employer] (which disclaims liability or warranty for this information). If you have questions about a medical condition or this instruction, always ask your healthcare professional. [place] [address] any warranty or liability for your use of this information. Learning About Vitamin D Why is it important to get enough vitamin D? Your body needs vitamin D to absorb calcium. Calcium keeps your bones and muscles, including your heart, healthy and strong. If your muscles don't get enough calcium, they can cramp, hurt, or feel weak. You may have long-term (chronic) muscle aches and pains. If you don't get enough vitamin D throughout life, you have an increased chance of having thin and brittle bones (osteoporosis) in your later years. Children who don't get enough vitamin D may not grow as much as others their age. They also have a chance of getting a rare disease called rickets. It causes weak bones. Vitamin D and calcium are added to many foods. And your body uses sunshine to make its own vitamin D. How much vitamin D do you need? The [place] of [employer] recommends that people ages 3 through 79 get 600 IU (international units) every day. Adults 71 and older need 800 IU every day. Blood tests for vitamin D can check your vitamin D level. But there is no standard normal range used by all laboratories. You're likely getting enough vitamin D if your levels are in the range of 20 to 50 ng/mL. How can you get more vitamin D? Foods that contain vitamin D include: 
· [place], tuna, and mackerel. These are some of the best foods to eat when you need to get more vitamin D. 
· Cheese, egg yolks, and beef liver. These foods have vitamin D in small amounts. · Milk, soy drinks, orange juice, yogurt, margarine, and some kinds of cereal have vitamin D added to them. Some people don't make vitamin D as well as others. They may have to take extra care in getting enough vitamin D. Things that reduce how much vitamin D your body makes include: · Dark skin, such as many  Americans have. · Age, especially if you are older than 72. · Digestive problems, such as Crohn's or celiac disease. · Liver and kidney disease. Some people who do not get enough vitamin D may need supplements. Are there any risks from taking vitamin D? 
· Too much vitamin D: 
? Can damage your kidneys. ? Can cause nausea and vomiting, constipation, and weakness. ? Raises the amount of calcium in your blood. If this happens, you can get confused or have an irregular heart rhythm. · Vitamin D may interact with other medicines. Tell your doctor about all of the medicines you take, including over-the-counter drugs, herbs, and pills. Tell your doctor about all of your current medical problems. Where can you learn more? Go to http://www.gray.com/ Enter [id number] in the search box to learn more about \"Learning About Vitamin D.\" 
Current as of: August 22, 2019               Content Version: 12.6 © 0181-8736 [employer]. Care instructions adapted under license by [employer] (which disclaims liability or warranty for this information). If you have questions about a medical condition or this instruction, always ask your healthcare professional. [address] any warranty or liability for your use of this information.

## 2020-11-19 NOTE — PROGRESS NOTES
Chief Complaint   Patient presents with   [address] [place] [address]     1. Have you been to the ER, urgent care clinic since your last visit? Hospitalized since your last visit? No    2. Have you seen or consulted any other health care providers outside of the [address] [place] [address] since your last visit? Include any pap smears or colon screening.  No

## 2020-11-20 NOTE — PROGRESS NOTES
Subjective:  Johanna Frye is a 62 y.o. female and presents with Hypertension and Hyperlipidemia to Establish Care  . Hypertension Review:  The patient has essential hypertension which is not well managed, 162/76 at today's visit. Diet and Lifestyle: generally follows a  low sodium diet, exercises sporadically  Home BP Monitoring: is not measured at home. Pertinent ROS: taking medications as instructed, no medication side effects noted, no TIA's, no chest pain on exertion, no dyspnea on exertion, no swelling of ankles. Hyperlipidemia Review:  Patient presents for follow up and evaluation of lipids. Compliance with treatment thus far has not been excellent, a repeat fasting lipid profile was done at today's office visit. Patient is not adhering to dietary recommendations. Patient states that she does not exercise regularly, was advised of exercise recommendations of 150 minutes a week. The patient does not use medications that may worsen dyslipidemias (corticosteroids, progestins, anabolic steroids, amiodarone, cyclosporine, olanzapine). Review of Systems  Constitutional: negative for fevers, chills, anorexia and weight loss  Eyes:   Blind in Left Eye  ENT:   negative for tinnitus,sore throat,nasal congestion,ear pains. hoarseness  Respiratory:  negative for cough, hemoptysis, dyspnea,wheezing  CV:   negative for chest pain, palpitations, lower extremity edema  GI:   negative for nausea, vomiting, diarrhea, abdominal pain,melena  Endo:               negative for polyuria,polydipsia,polyphagia,heat intolerance  Genitourinary: negative for frequency, dysuria and hematuria  Integument:  negative for rash and pruritus  Hematologic:  negative for easy bruising and gum/nose bleeding  Musculoskel: negative for myalgias, arthralgias, back pain, muscle weakness, joint pain  Neurological:  negative for headaches, dizziness, vertigo, memory problems and gait   Behavl/Psych: negative for feelings of anxiety, depression, mood changes    Past Medical History:   Diagnosis Date    Blindness of left eye     Hypertension      Past Surgical History:   Procedure Laterality Date    HX GYN       Social History     Socioeconomic History    Marital status: UNKNOWN     Spouse name: Not on file    Number of children: Not on file    Years of education: Not on file    Highest education level: Not on file   Tobacco Use    Smoking status: Former Smoker     Packs/day: 3.00    Smokeless tobacco: Never Used    Tobacco comment: smoker for 17 years; 3 cigs a day   Substance and Sexual Activity    Alcohol use:  Yes     Alcohol/week: 1.0 standard drinks     Types: 1 Glasses of wine per week     Comment: social    Drug use: No    Sexual activity: Never     Family History   Problem Relation Age of Onset    Hypertension Mother     Hypertension Father     Hypertension Sister     Hypertension Brother      Current Outpatient Medications   Medication Sig Dispense Refill    lisinopril-hydroCHLOROthiazide (PRINZIDE, ZESTORETIC) 10-12.5 mg per tablet TAKE 1 TABLET BY MOUTH EVERY DAY 30 Tab 0     No Known Allergies    Objective:  Visit Vitals  BP (!) 162/76 (BP 1 Location: Left arm, BP Patient Position: Sitting) Comment: pt been without BP meds approx 3 days   Pulse 87   Temp 98.4 °F (36.9 °C) (Oral)   Resp 16   Ht 5' 7\" (1.702 m)   Wt 202 lb (91.6 kg)   SpO2 98%   BMI 31.64 kg/m²     Physical Exam:   General appearance - alert, well appearing, and in no distress  Mental status - alert, oriented to person, place, and time  EYE-PRASHANTH, EOMI, corneas normal, no foreign bodies  ENT-ENT exam normal, no neck nodes or sinus tenderness  Nose - normal and patent, no erythema, discharge or polyps  Mouth - mucous membranes moist, pharynx normal without lesions  Neck - supple, no significant adenopathy   Chest - clear to auscultation, no wheezes, rales or rhonchi, symmetric air entry   Heart - normal rate, regular rhythm, normal S1, S2, no murmurs, rubs, clicks or gallops   Abdomen - soft, nontender, nondistended, no masses or organomegaly  Lymph- no adenopathy palpable  Ext-peripheral pulses normal, no pedal edema, no clubbing or cyanosis  Skin-Warm and dry. no hyperpigmentation, vitiligo, or suspicious lesions  Neuro -alert, oriented, normal speech, no focal findings or movement disorder noted  Neck-normal C-spine, no tenderness, full ROM without pain  Feet-no nail deformities or callus formation with good pulses noted      Results for orders placed or performed in visit on 11/19/20   VITAMIN D, 25 HYDROXY   Result Value Ref Range    Vitamin D 25-Hydroxy <9.0 (L) 30 - 334 ng/mL   METABOLIC PANEL, COMPREHENSIVE   Result Value Ref Range    Sodium 139 136 - 145 mmol/L    Potassium 3.9 3.5 - 5.1 mmol/L    Chloride 107 97 - 108 mmol/L    CO2 27 21 - 32 mmol/L    Anion gap 5 5 - 15 mmol/L    Glucose 101 (H) 65 - 100 mg/dL    BUN 10 6 - 20 MG/DL    Creatinine 0.77 0.55 - 1.02 MG/DL    BUN/Creatinine ratio 13 12 - 20      GFR est AA >60 >60 ml/min/1.73m2    GFR est non-AA >60 >60 ml/min/1.73m2    Calcium 9.4 8.5 - 10.1 MG/DL    Bilirubin, total 0.5 0.2 - 1.0 MG/DL    ALT (SGPT) 19 12 - 78 U/L    AST (SGOT) 12 (L) 15 - 37 U/L    Alk.  phosphatase 89 45 - 117 U/L    Protein, total 7.6 6.4 - 8.2 g/dL    Albumin 4.0 3.5 - 5.0 g/dL    Globulin 3.6 2.0 - 4.0 g/dL    A-G Ratio 1.1 1.1 - 2.2     AMB POC GLUCOSE BLOOD, BY GLUCOSE MONITORING DEVICE   Result Value Ref Range    Glucose  mg/dL   AMB POC HEMOGLOBIN A1C   Result Value Ref Range    Hemoglobin A1c (POC) 5.3 %   AMB POC LIPID PROFILE   Result Value Ref Range    Cholesterol (POC) 229     Triglycerides (POC) 137     HDL Cholesterol (POC) 62     LDL Cholesterol (POC) 140 MG/DL    Non-HDL Goal (POC) 167     TChol/HDL Ratio (POC) 3.7    AMB POC URINALYSIS DIP STICK AUTO W/O MICRO   Result Value Ref Range    Color (UA POC) Yellow     Clarity (UA POC) Clear     Glucose (UA POC) Negative Negative    Bilirubin (UA POC) Negative Negative    Ketones (UA POC) Negative Negative    Specific gravity (UA POC) 1.030 1.001 - 1.035    Blood (UA POC) Negative Negative    pH (UA POC) 5 4.6 - 8.0    Protein (UA POC) Negative Negative    Urobilinogen (UA POC) 0.2 mg/dL 0.2 - 1    Nitrites (UA POC) Negative Negative    Leukocyte esterase (UA POC) Negative Negative       Assessment/Plan:    ICD-10-CM ICD-9-CM    1. Establishing care with new doctor, encounter for  Z76.89 V65.8 AMB POC GLUCOSE BLOOD, BY GLUCOSE MONITORING DEVICE      AMB POC HEMOGLOBIN A1C      AMB POC LIPID PROFILE      AMB POC URINALYSIS DIP STICK AUTO W/O MICRO      CBC W/O DIFF      METABOLIC PANEL, COMPREHENSIVE      VITAMIN D, 25 HYDROXY      VITAMIN D, 25 HYDROXY      METABOLIC PANEL, COMPREHENSIVE      CBC W/O DIFF   2. Essential hypertension  I10 401.9 CBC W/O DIFF      lisinopril-hydroCHLOROthiazide (PRINZIDE, ZESTORETIC) 10-12.5 mg per tablet      METABOLIC PANEL, COMPREHENSIVE      REFERRAL TO NUTRITION      METABOLIC PANEL, COMPREHENSIVE      CBC W/O DIFF   3. Mixed hyperlipidemia  E78.2 272.2 REFERRAL TO NUTRITION   4. Vitamin D deficiency  E55.9 268.9 VITAMIN D, 25 HYDROXY      VITAMIN D, 25 HYDROXY   5. Unintended weight gain  R63.5 783.1 REFERRAL TO NUTRITION   6. Screen for colon cancer  Z12.11 V76.51 REFERRAL FOR COLONOSCOPY   7. Encounter for screening mammogram for malignant neoplasm of breast  Z12.31 V76.12 [facility] MAMMO BI SCREENING INCL CAD   8.  Medication refill  Z76.0 V68.1 lisinopril-hydroCHLOROthiazide (PRINZIDE, ZESTORETIC) 10-12.5 mg per tablet     Orders Placed This Encounter    [facility] MAMMO BI SCREENING INCL CAD ([id number])     Standing Status:   Future     Standing Expiration Date:   11/19/2021     Order Specific Question:   Reason for Exam     Answer:   Screening    CBC W/O DIFF     Standing Status:   Future     Number of Occurrences:   1     Standing Expiration Date:   10/61/2428    METABOLIC PANEL, COMPREHENSIVE     Standing Status:   Future Number of Occurrences:   1     Standing Expiration Date:   11/19/2021    VITAMIN D, 25 HYDROXY     Standing Status:   Future     Number of Occurrences:   1     Standing Expiration Date:   11/19/2021    REFERRAL TO NUTRITION     Referral Priority:   Routine     Referral Type:   Consultation     Referral Reason:   Specialty Services Required     Referred to Provider:   Giacomo Rea RD     Requested Specialty:   Nutrition     Number of Visits Requested:   1    Referral for Colonoscopy (options for GI, Colon &  Rectal Surgery, & General Surgery)     Referral Priority:   Routine     Referral Type:   Consultation     Referral Reason:   Specialty Services Required     Referred to Provider:   Denice Lindsay MD     Requested Specialty:   Gastroenterology     Number of Visits Requested:   1    AMB POC GLUCOSE BLOOD, BY GLUCOSE MONITORING DEVICE    AMB POC HEMOGLOBIN A1C    AMB POC LIPID PROFILE    AMB POC URINALYSIS DIP STICK AUTO W/O MICRO    lisinopril-hydroCHLOROthiazide (PRINZIDE, ZESTORETIC) 10-12.5 mg per tablet     Sig: TAKE 1 TABLET BY MOUTH EVERY DAY     Dispense:  30 Tab     Refill:  0     Patient Instructions          High Cholesterol: Care Instructions  Your Care Instructions     Cholesterol is a type of fat in your blood. It is needed for many body functions, such as making new cells. Cholesterol is made by your body. It also comes from food you eat. High cholesterol means that you have too much of the fat in your blood. This raises your risk of a heart attack and stroke. LDL and HDL are part of your total cholesterol. LDL is the \"bad\" cholesterol. High LDL can raise your risk for heart disease, heart attack, and stroke. HDL is the \"good\" cholesterol. It helps clear bad cholesterol from the body. High HDL is linked with a lower risk of heart disease, heart attack, and stroke. Your cholesterol levels help your doctor find out your risk for having a heart attack or stroke.  You and your doctor can talk about whether you need to lower your risk and what treatment is best for you. A heart-healthy lifestyle along with medicines can help lower your cholesterol and your risk. The way you choose to lower your risk will depend on how high your risk is for heart attack and stroke. It will also depend on how you feel about taking medicines. Follow-up care is a key part of your treatment and safety. Be sure to make and go to all appointments, and call your doctor if you are having problems. It's also a good idea to know your test results and keep a list of the medicines you take. How can you care for yourself at home? · Eat a variety of foods every day. Good choices include fruits, vegetables, whole grains (like oatmeal), dried beans and peas, nuts and seeds, soy products (like tofu), and fat-free or low-fat dairy products. · Replace butter, margarine, and hydrogenated or partially hydrogenated oils with olive and canola oils. (Canola oil margarine without trans fat is fine.)  · Replace red meat with fish, poultry, and soy protein (like tofu). · Limit processed and packaged foods like chips, crackers, and cookies. · Bake, broil, or steam foods. Don't riley them. · Be physically active. Get at least 30 minutes of exercise on most days of the week. Walking is a good choice. You also may want to do other activities, such as running, swimming, cycling, or playing tennis or team sports. · Stay at a healthy weight or lose weight by making the changes in eating and physical activity listed above. Losing just a small amount of weight, even 5 to 10 pounds, can reduce your risk for having a heart attack or stroke. · Do not smoke. When should you call for help? Watch closely for changes in your health, and be sure to contact your doctor if:    · You need help making lifestyle changes.     · You have questions about your medicine. Where can you learn more?   Go to http://www.gray.com/  Enter F581 in the search box to learn more about \"High Cholesterol: Care Instructions. \"  Current as of: December 16, 2019               Content Version: 12.6  © 3119-7692 [employer]. Care instructions adapted under license by [employer] (which disclaims liability or warranty for this information). If you have questions about a medical condition or this instruction, always ask your healthcare professional. [address] any warranty or liability for your use of this information. High Blood Pressure: Care Instructions  Overview     It's normal for blood pressure to go up and down throughout the day. But if it stays up, you have high blood pressure. Another name for high blood pressure is hypertension. Despite what a lot of people think, high blood pressure usually doesn't cause headaches or make you feel dizzy or lightheaded. It usually has no symptoms. But it does increase your risk of stroke, heart attack, and other problems. You and your doctor will talk about your risks of these problems based on your blood pressure. Your doctor will give you a goal for your blood pressure. Your goal will be based on your health and your age. Lifestyle changes, such as eating healthy and being active, are always important to help lower blood pressure. You might also take medicine to reach your blood pressure goal.  Follow-up care is a key part of your treatment and safety. Be sure to make and go to all appointments, and call your doctor if you are having problems. It's also a good idea to know your test results and keep a list of the medicines you take. How can you care for yourself at home? Medical treatment  · If you stop taking your medicine, your blood pressure will go back up. You may take one or more types of medicine to lower your blood pressure. Be safe with medicines. Take your medicine exactly as prescribed.  Call your doctor if you think you are having a problem with your medicine. · Talk to your doctor before you start taking aspirin every day. Aspirin can help certain people lower their risk of a heart attack or stroke. But taking aspirin isn't right for everyone, because it can cause serious bleeding. · See your doctor regularly. You may need to see the doctor more often at first or until your blood pressure comes down. · If you are taking blood pressure medicine, talk to your doctor before you take decongestants or anti-inflammatory medicine, such as ibuprofen. Some of these medicines can raise blood pressure. · Learn how to check your blood pressure at home. Lifestyle changes  · Stay at a healthy weight. This is especially important if you put on weight around the waist. Losing even 10 pounds can help you lower your blood pressure. · If your doctor recommends it, get more exercise. Walking is a good choice. Bit by bit, increase the amount you walk every day. Try for at least 30 minutes on most days of the week. You also may want to swim, bike, or do other activities. · Avoid or limit alcohol. Talk to your doctor about whether you can drink any alcohol. · Try to limit how much sodium you eat to less than 2,300 milligrams (mg) a day. Your doctor may ask you to try to eat less than 1,500 mg a day. · Eat plenty of fruits (such as bananas and oranges), vegetables, legumes, whole grains, and low-fat dairy products. · Lower the amount of saturated fat in your diet. Saturated fat is found in animal products such as milk, cheese, and meat. Limiting these foods may help you lose weight and also lower your risk for heart disease. · Do not smoke. Smoking increases your risk for heart attack and stroke. If you need help quitting, talk to your doctor about stop-smoking programs and medicines. These can increase your chances of quitting for good. When should you call for help?    Call  911 anytime you think you may need emergency care. This may mean having symptoms that suggest that your blood pressure is causing a serious heart or blood vessel problem. Your blood pressure may be over 180/120. For example, call 911 if:    · You have symptoms of a heart attack. These may include:  ? Chest pain or pressure, or a strange feeling in the chest.  ? Sweating. ? Shortness of breath. ? Nausea or vomiting. ? Pain, pressure, or a strange feeling in the back, neck, jaw, or upper belly or in one or both shoulders or arms. ? Lightheadedness or sudden weakness. ? A fast or irregular heartbeat.     · You have symptoms of a stroke. These may include:  ? Sudden numbness, tingling, weakness, or loss of movement in your face, arm, or leg, especially on only one side of your body. ? Sudden vision changes. ? Sudden trouble speaking. ? Sudden confusion or trouble understanding simple statements. ? Sudden problems with walking or balance. ? A sudden, severe headache that is different from past headaches.     · You have severe back or belly pain. Do not wait until your blood pressure comes down on its own. Get help right away. Call your doctor now or seek immediate care if:    · Your blood pressure is much higher than normal (such as 180/120 or higher), but you don't have symptoms.     · You think high blood pressure is causing symptoms, such as:  ? Severe headache.  ? Blurry vision. Watch closely for changes in your health, and be sure to contact your doctor if:    · Your blood pressure measures higher than your doctor recommends at least 2 times. That means the top number is higher or the bottom number is higher, or both.     · You think you may be having side effects from your blood pressure medicine. Where can you learn more? Go to http://www.gray.com/  Enter [id number] in the search box to learn more about \"High Blood Pressure: Care Instructions. \"  Current as of: December 16, 2019               Content Version: 12.6  © 2060-7622 [employer]. Care instructions adapted under license by [employer] (which disclaims liability or warranty for this information). If you have questions about a medical condition or this instruction, always ask your healthcare professional. [address] any warranty or liability for your use of this information. Low Sodium Diet (2,000 Milligram): Care Instructions  Your Care Instructions     Too much sodium causes your body to hold on to extra water. This can raise your blood pressure and force your heart and kidneys to work harder. In very serious cases, this could cause you to be put in the hospital. It might even be life-threatening. By limiting sodium, you will feel better and lower your risk of serious problems. The most common source of sodium is salt. People get most of the salt in their diet from canned, prepared, and packaged foods. Fast food and restaurant meals also are very high in sodium. Your doctor will probably limit your sodium to less than 2,000 milligrams (mg) a day. This limit counts all the sodium in prepared and packaged foods and any salt you add to your food. Follow-up care is a key part of your treatment and safety. Be sure to make and go to all appointments, and call your doctor if you are having problems. It's also a good idea to know your test results and keep a list of the medicines you take. How can you care for yourself at home? Read food labels  · Read labels on cans and food packages. The labels tell you how much sodium is in each serving. Make sure that you look at the serving size. If you eat more than the serving size, you have eaten more sodium. · Food labels also tell you the Percent Daily Value for sodium. Choose products with low Percent Daily Values for sodium.   · Be aware that sodium can come in forms other than salt, including monosodium glutamate (MSG), sodium citrate, and sodium bicarbonate (baking soda). MSG is often added to Asian food. When you eat out, you can sometimes ask for food without MSG or added salt. Buy low-sodium foods  · Buy foods that are labeled \"unsalted\" (no salt added), \"sodium-free\" (less than 5 mg of sodium per serving), or \"low-sodium\" (less than 140 mg of sodium per serving). Foods labeled \"reduced-sodium\" and \"light sodium\" may still have too much sodium. Be sure to read the label to see how much sodium you are getting. · Buy fresh vegetables, or frozen vegetables without added sauces. Buy low-sodium versions of canned vegetables, soups, and other canned goods. Prepare low-sodium meals  · Cut back on the amount of salt you use in cooking. This will help you adjust to the taste. Do not add salt after cooking. One teaspoon of salt has about 2,300 mg of sodium. · Take the salt shaker off the table. · Flavor your food with garlic, lemon juice, onion, vinegar, herbs, and spices. Do not use soy sauce, lite soy sauce, steak sauce, onion salt, garlic salt, celery salt, mustard, or ketchup on your food. · Use low-sodium salad dressings, sauces, and ketchup. Or make your own salad dressings and sauces without adding salt. · Use less salt (or none) when recipes call for it. You can often use half the salt a recipe calls for without losing flavor. Other foods such as rice, pasta, and grains do not need added salt. · Rinse canned vegetables, and cook them in fresh water. This removes somebut not allof the salt. · Avoid water that is naturally high in sodium or that has been treated with water softeners, which add sodium. Call your local water company to find out the sodium content of your water supply. If you buy bottled water, read the label and choose a sodium-free brand. Avoid high-sodium foods  · Avoid eating:  ? Smoked, cured, salted, and canned meat, fish, and poultry. ? Ham, corrales, hot dogs, and luncheon meats.   ? Regular, hard, and processed cheese and regular peanut butter. ? Crackers with salted tops, and other salted snack foods such as pretzels, chips, and salted popcorn. ? Frozen prepared meals, unless labeled low-sodium. ? Canned and dried soups, broths, and bouillon, unless labeled sodium-free or low-sodium. ? Canned vegetables, unless labeled sodium-free or low-sodium. ? Meredith Fox fries, pizza, tacos, and other fast foods. ? Pickles, olives, ketchup, and other condiments, especially soy sauce, unless labeled sodium-free or low-sodium. Where can you learn more? Go to http://www.gray.com/  Enter V843 in the search box to learn more about \"Low Sodium Diet (2,000 Milligram): Care Instructions. \"  Current as of: August 22, 2019               Content Version: 12.6  © 6123-0933 [employer] Care instructions adapted under license by [employer] (which disclaims liability or warranty for this information). If you have questions about a medical condition or this instruction, always ask your healthcare professional. [place] [address] any warranty or liability for your use of this information. Mammogram: About This Test  What is it? A mammogram is an X-ray of the breast that is used to screen for breast cancer. This test can find tumors that are too small for you or your doctor to feel. Cancer is most easily treated when it is found at an early stage. Why is this test done? A mammogram is done to:  · Look for breast cancer in women who don't have symptoms. · Find breast cancer in women who have symptoms. Symptoms of breast cancer may include a lump or thickening in the breast, nipple discharge, or dimpling of the skin on one area of the breast.  · Find an area of suspicious breast tissue to remove for an exam under a microscope (biopsy). How do you prepare for the test?  If you've had a mammogram before at another clinic, have the results sent or bring them with you to your appointment.   On the day of the mammogram, don't use any deodorant. And don't use perfume, powders, or ointments near or on your breasts. The residue left on your skin by these substances may interfere with the X-rays. How is the test done? · You will need to take off any jewelry that might interfere with the X-ray pictures. · You will need to take off your clothes above the waist.  · You will be given a cloth or paper gown to use during the test.  · You probably will stand during the mammogram.  · One at a time, your breasts will be placed on a flat plate. · Another plate is then pressed firmly against your breast to help flatten out the breast tissue. You may be asked to lift your arm. · For a few seconds while the X-ray picture is being taken, you will need to hold your breath. · At least two pictures are taken of each breast. One is taken from the top and one from the side. How does having a mammogram feel? A mammogram is often uncomfortable but rarely painful. If you have sensitive or fragile skin or a skin condition, let the technician know before you have your exam. If you have menstrual periods, the procedure is more comfortable when done within 2 weeks after your period has ended. Having your breasts flattened is usually uncomfortable, but it helps the technician get the best images. How long does the test take? · The test will take about 10 to 15 minutes. You may be in the clinic for up to an hour. · You may be asked to wait a few minutes while the images are checked to make sure they don't need to be redone. What happens after the test?  · You will probably be able to go home right away. · You can go back to your usual activities right away. Follow-up care is a key part of your treatment and safety. Be sure to make and go to all appointments, and call your doctor if you are having problems. It's also a good idea to keep a list of the medicines you take.  Ask your doctor when you can expect to have your test results. Where can you learn more? Go to http://www.Kinematix.com/  Enter Z238 in the search box to learn more about \"Mammogram: About This Test.\"  Current as of: April 29, 2020               Content Version: 12.6  © 1150-5761 [employer]. Care instructions adapted under license by [employer] (which disclaims liability or warranty for this information). If you have questions about a medical condition or this instruction, always ask your healthcare professional. [address] any warranty or liability for your use of this information. Learning About Vitamin D  Why is it important to get enough vitamin D? Your body needs vitamin D to absorb calcium. Calcium keeps your bones and muscles, including your heart, healthy and strong. If your muscles don't get enough calcium, they can cramp, hurt, or feel weak. You may have long-term (chronic) muscle aches and pains. If you don't get enough vitamin D throughout life, you have an increased chance of having thin and brittle bones (osteoporosis) in your later years. Children who don't get enough vitamin D may not grow as much as others their age. They also have a chance of getting a rare disease called rickets. It causes weak bones. Vitamin D and calcium are added to many foods. And your body uses sunshine to make its own vitamin D. How much vitamin D do you need? The [place] of [employer] recommends that people ages 3 through 79 get 600 IU (international units) every day. Adults 71 and older need 800 IU every day. Blood tests for vitamin D can check your vitamin D level. But there is no standard normal range used by all laboratories. You're likely getting enough vitamin D if your levels are in the range of 20 to 50 ng/mL. How can you get more vitamin D? Foods that contain vitamin D include:  · [place], tuna, and mackerel.  These are some of the best foods to eat when you need to get more vitamin D.  · Cheese, egg yolks, and beef liver. These foods have vitamin D in small amounts. · Milk, soy drinks, orange juice, yogurt, margarine, and some kinds of cereal have vitamin D added to them. Some people don't make vitamin D as well as others. They may have to take extra care in getting enough vitamin D. Things that reduce how much vitamin D your body makes include:  · Dark skin, such as many  Americans have. · Age, especially if you are older than 72. · Digestive problems, such as Crohn's or celiac disease. · Liver and kidney disease. Some people who do not get enough vitamin D may need supplements. Are there any risks from taking vitamin D?  · Too much vitamin D:  ? Can damage your kidneys. ? Can cause nausea and vomiting, constipation, and weakness. ? Raises the amount of calcium in your blood. If this happens, you can get confused or have an irregular heart rhythm. · Vitamin D may interact with other medicines. Tell your doctor about all of the medicines you take, including over-the-counter drugs, herbs, and pills. Tell your doctor about all of your current medical problems. Where can you learn more? Go to http://www.george.com/  Enter V530 in the search box to learn more about \"Learning About Vitamin D.\"  Current as of: August 22, 2019               Content Version: 12.6  © 5707-0482 [employer]. Care instructions adapted under license by [employer] (which disclaims liability or warranty for this information). If you have questions about a medical condition or this instruction, always ask your healthcare professional. [place] [address] any warranty or liability for your use of this information. Follow-up and Dispositions    · Return in about 2 weeks (around 12/3/2020), or if symptoms worsen or fail to improve, for PAP SMEAR, Flu shot and discuss lab results.         have reviewed with the patient details of the assessment and plan and all questions were answered. Relevent patient education was performed. The most recent lab findings were reviewed with the patient. An After Visit Summary was printed and given to the patient.

## 2020-11-27 NOTE — PROGRESS NOTES
Results were received and reviewed. Patient has a follow-up appointment on 12-1-2020 to discuss test results.

## 2020-12-03 ENCOUNTER — OFFICE VISIT (OUTPATIENT)
Dept: INTERNAL MEDICINE CLINIC | Age: 60
End: 2020-12-03
Payer: COMMERCIAL

## 2020-12-03 VITALS
TEMPERATURE: 98.3 F | OXYGEN SATURATION: 97 % | RESPIRATION RATE: 16 BRPM | SYSTOLIC BLOOD PRESSURE: 140 MMHG | DIASTOLIC BLOOD PRESSURE: 80 MMHG | HEIGHT: 67 IN | BODY MASS INDEX: 31.86 KG/M2 | WEIGHT: 203 LBS | HEART RATE: 81 BPM

## 2020-12-03 DIAGNOSIS — E78.2 MIXED HYPERLIPIDEMIA: ICD-10-CM

## 2020-12-03 DIAGNOSIS — Z23 ENCOUNTER FOR IMMUNIZATION: ICD-10-CM

## 2020-12-03 DIAGNOSIS — I10 ESSENTIAL HYPERTENSION: ICD-10-CM

## 2020-12-03 DIAGNOSIS — Z71.2 ENCOUNTER TO DISCUSS TEST RESULTS: Primary | ICD-10-CM

## 2020-12-03 DIAGNOSIS — Z76.0 MEDICATION REFILL: ICD-10-CM

## 2020-12-03 DIAGNOSIS — E55.9 VITAMIN D DEFICIENCY: ICD-10-CM

## 2020-12-03 LAB
CHOLEST SERPL-MCNC: 270 MG/DL
HDLC SERPL-MCNC: 73 MG/DL
LDL CHOLESTEROL POC: 182 MG/DL
NON-HDL GOAL (POC): 198
TCHOL/HDL RATIO (POC): 3.7
TRIGL SERPL-MCNC: 80 MG/DL

## 2020-12-03 PROCEDURE — 80061 LIPID PANEL: CPT | Performed by: NURSE PRACTITIONER

## 2020-12-03 PROCEDURE — 99213 OFFICE O/P EST LOW 20 MIN: CPT | Performed by: NURSE PRACTITIONER

## 2020-12-03 RX ORDER — ERGOCALCIFEROL 1.25 MG/1
50000 CAPSULE ORAL
Qty: 4 CAP | Refills: 2 | Status: SHIPPED | OUTPATIENT
Start: 2020-12-03 | End: 2021-03-03 | Stop reason: SDUPTHER

## 2020-12-03 RX ORDER — LISINOPRIL AND HYDROCHLOROTHIAZIDE 10; 12.5 MG/1; MG/1
TABLET ORAL
Qty: 30 TAB | Refills: 2 | Status: SHIPPED | OUTPATIENT
Start: 2020-12-03 | End: 2021-04-09

## 2020-12-03 NOTE — PATIENT INSTRUCTIONS
Learning About High Blood Pressure  What is high blood pressure? Blood pressure is a measure of how hard the blood pushes against the walls of your arteries. It's normal for blood pressure to go up and down throughout the day. But if it stays up, you have high blood pressure. Another name for high blood pressure is hypertension. Two numbers tell you your blood pressure. The first number is the systolic pressure (top number). It shows how hard the blood pushes when your heart is pumping. The second number is the diastolic pressure (bottom number). It shows how hard the blood pushes between heartbeats, when your heart is relaxed and filling with blood. Your doctor will give you a goal for your blood pressure based on your health and your age. High blood pressure (hypertension) means that the top number stays high, or the bottom number stays high, or both. High blood pressure increases the risk of stroke, heart attack, and other problems. What happens when you have high blood pressure? · Blood flows through your arteries with too much force. Over time, this can damage the heart and the walls of your arteries. But you can't feel it. High blood pressure usually doesn't cause symptoms. · High blood pressure makes your heart work harder. And that can lead to heart failure, which means your heart doesn't pump as much blood as your body needs. · Fat and calcium start to build up in your arteries. This buildup is called hardening of the arteries. It can cause many problems including a heart attack and stroke. · Arteries also carry blood and oxygen to organs like your eyes, kidneys, and brain. If high blood pressure damages those arteries, it can lead to vision loss, kidney disease, stroke, and a higher risk of dementia. How can you prevent high blood pressure? · Stay at a healthy weight. · Try to limit how much sodium you eat to less than 2,300 milligrams (mg) a day.  If you limit your sodium to 1,500 mg a day, you can lower your blood pressure even more. ? Buy foods that are labeled \"unsalted,\" \"sodium-free,\" or \"low-sodium. \" Foods labeled \"reduced-sodium\" and \"light sodium\" may still have too much sodium. ? Flavor your food with garlic, lemon juice, onion, vinegar, herbs, and spices instead of salt. Do not use soy sauce, steak sauce, onion salt, garlic salt, mustard, or ketchup on your food. ? Use less salt (or none) when recipes call for it. You can often use half the salt a recipe calls for without losing flavor. · Be physically active. Get at least 30 minutes of exercise on most days of the week. Walking is a good choice. You also may want to do other activities, such as running, swimming, cycling, or playing tennis or team sports. · Limit alcohol to 2 drinks a day for men and 1 drink a day for women. · Eat plenty of fruits, vegetables, and low-fat dairy products. Eat less saturated and total fats. How is high blood pressure treated? · Your doctor will suggest making lifestyle changes to help your heart. For example, your doctor may ask you to eat healthy foods, quit smoking, lose extra weight, and be more active. · If lifestyle changes don't help enough, your doctor may recommend that you take medicine. · When blood pressure is very high, medicines are needed to lower it. Follow-up care is a key part of your treatment and safety. Be sure to make and go to all appointments, and call your doctor if you are having problems. It's also a good idea to know your test results and keep a list of the medicines you take. Where can you learn more? Go to http://www.Zipmark.com/  Enter P501 in the search box to learn more about \"Learning About High Blood Pressure. \"  Current as of: December 16, 2019               Content Version: 12.6  © 8398-8626 [employer].    Care instructions adapted under license by [employer] (which disclaims liability or warranty for this information). If you have questions about a medical condition or this instruction, always ask your healthcare professional. [place] [address] any warranty or liability for your use of this information.

## 2020-12-03 NOTE — PROGRESS NOTES
Subjective:  Joaquin Carrasco is a 62 y.o. female and presents with Hypertension to Discuss Lab Results (Vitamin D Deficiency). Hypertension Review:  The patient has essential hypertension   Diet and Lifestyle: generally follows a  low sodium diet, exercises sporadically  Home BP Monitoring: is not measured at home. Pertinent ROS: taking medications as instructed, no medication side effects noted, no TIA's, no chest pain on exertion, no dyspnea on exertion, no swelling of ankles. VitaminD Deficiency Review  Patient presents with Vitamin D deficiency. Patient denies symptoms of bone pain, muscle weakness, fatigue or other neurological symptoms. Labs were ordered at patient's last office visit to assess the need for Vitamin D replacement. Viitamin D level was recorded at 9.0    Review of Systems  Constitutional: negative for fevers, chills, anorexia and weight loss  Eyes:   negative for visual disturbance and irritation  ENT:   negative for tinnitus,sore throat,nasal congestion,ear pains. hoarseness  Respiratory:  negative for cough, hemoptysis, dyspnea,wheezing  CV:   negative for chest pain, palpitations, lower extremity edema  GI:   negative for nausea, vomiting, diarrhea, abdominal pain,melena  Endo:               negative for polyuria,polydipsia,polyphagia,heat intolerance  Genitourinary: negative for frequency, dysuria and hematuria  Integument:  negative for rash and pruritus  Hematologic:  negative for easy bruising and gum/nose bleeding  Musculoskel: negative for myalgias, arthralgias, back pain, muscle weakness, joint pain  Neurological:  negative for headaches, dizziness, vertigo, memory problems and gait   Behavl/Psych: negative for feelings of anxiety, depression, mood changes    Past Medical History:   Diagnosis Date    Blindness of left eye     Hypertension      Past Surgical History:   Procedure Laterality Date    HX GYN       Social History     Socioeconomic History    Marital status: UNKNOWN     Spouse name: Not on file    Number of children: Not on file    Years of education: Not on file    Highest education level: Not on file   Tobacco Use    Smoking status: Former Smoker     Packs/day: 3.00    Smokeless tobacco: Never Used    Tobacco comment: smoker for 17 years; 3 cigs a day   Substance and Sexual Activity    Alcohol use: Yes     Alcohol/week: 1.0 standard drinks     Types: 1 Glasses of wine per week     Comment: social    Drug use: No    Sexual activity: Never     Family History   Problem Relation Age of Onset    Hypertension Mother     Hypertension Father     Hypertension Sister     Hypertension Brother      Current Outpatient Medications   Medication Sig Dispense Refill    ergocalciferol (ERGOCALCIFEROL) 1,250 mcg (50,000 unit) capsule Take 1 Cap by mouth every seven (7) days.  4 Cap 2    lisinopril-hydroCHLOROthiazide (PRINZIDE, ZESTORETIC) 10-12.5 mg per tablet TAKE 1 TABLET BY MOUTH EVERY DAY 30 Tab 2     No Known Allergies    Objective:  Visit Vitals  BP (!) 140/80 (BP 1 Location: Left arm, BP Patient Position: Sitting)   Pulse 81   Temp 98.3 °F (36.8 °C) (Oral)   Resp 16   Ht 5' 7\" (1.702 m)   Wt 203 lb (92.1 kg)   SpO2 97%   BMI 31.79 kg/m²     Physical Exam:   General appearance - alert, well appearing, and in no distress  Mental status - alert, oriented to person, place, and time  EYE-PRASHANTH, EOMI, corneas normal, no foreign bodies, +Wandering left Eye  ENT-ENT exam normal, no neck nodes or sinus tenderness  Nose - normal and patent, no erythema, discharge or polyps  Mouth - mucous membranes moist, pharynx normal without lesions  Neck - supple, no significant adenopathy   Chest - clear to auscultation, no wheezes, rales or rhonchi, symmetric air entry   Heart - normal rate, regular rhythm, normal S1, S2, no murmurs, rubs, clicks or gallops   Abdomen - soft, nontender, nondistended, no masses or organomegaly  Lymph- no adenopathy palpable  Ext-peripheral pulses normal, no pedal edema, no clubbing or cyanosis  Skin-Warm and dry. no hyperpigmentation, vitiligo, or suspicious lesions  Neuro -alert, oriented, normal speech, no focal findings or movement disorder noted  Neck-normal C-spine, no tenderness, full ROM without pain  Feet-no nail deformities or callus formation with good pulses noted      Results for orders placed or performed in visit on 11/19/20   VITAMIN D, 25 HYDROXY   Result Value Ref Range    Vitamin D 25-Hydroxy <9.0 (L) 30 - 831 ng/mL   METABOLIC PANEL, COMPREHENSIVE   Result Value Ref Range    Sodium 139 136 - 145 mmol/L    Potassium 3.9 3.5 - 5.1 mmol/L    Chloride 107 97 - 108 mmol/L    CO2 27 21 - 32 mmol/L    Anion gap 5 5 - 15 mmol/L    Glucose 101 (H) 65 - 100 mg/dL    BUN 10 6 - 20 MG/DL    Creatinine 0.77 0.55 - 1.02 MG/DL    BUN/Creatinine ratio 13 12 - 20      GFR est AA >60 >60 ml/min/1.73m2    GFR est non-AA >60 >60 ml/min/1.73m2    Calcium 9.4 8.5 - 10.1 MG/DL    Bilirubin, total 0.5 0.2 - 1.0 MG/DL    ALT (SGPT) 19 12 - 78 U/L    AST (SGOT) 12 (L) 15 - 37 U/L    Alk.  phosphatase 89 45 - 117 U/L    Protein, total 7.6 6.4 - 8.2 g/dL    Albumin 4.0 3.5 - 5.0 g/dL    Globulin 3.6 2.0 - 4.0 g/dL    A-G Ratio 1.1 1.1 - 2.2     CBC W/O DIFF   Result Value Ref Range    WBC 6.1 3.6 - 11.0 K/uL    RBC 4.35 3.80 - 5.20 M/uL    HGB 13.8 11.5 - 16.0 g/dL    HCT 42.7 35.0 - 47.0 %    MCV 98.2 80.0 - 99.0 FL    MCH 31.7 26.0 - 34.0 PG    MCHC 32.3 30.0 - 36.5 g/dL    RDW 12.7 11.5 - 14.5 %    PLATELET 730 864 - 731 K/uL    MPV 9.5 8.9 - 12.9 FL    NRBC 0.0 0  WBC    ABSOLUTE NRBC 0.00 0.00 - 0.01 K/uL   AMB POC GLUCOSE BLOOD, BY GLUCOSE MONITORING DEVICE   Result Value Ref Range    Glucose  mg/dL   AMB POC HEMOGLOBIN A1C   Result Value Ref Range    Hemoglobin A1c (POC) 5.3 %   AMB POC LIPID PROFILE   Result Value Ref Range    Cholesterol (POC) 229     Triglycerides (POC) 137     HDL Cholesterol (POC) 62     LDL Cholesterol (POC) 140 MG/DL    Non-HDL Goal (POC) 167     TChol/HDL Ratio (POC) 3.7    AMB POC URINALYSIS DIP STICK AUTO W/O MICRO   Result Value Ref Range    Color (UA POC) Yellow     Clarity (UA POC) Clear     Glucose (UA POC) Negative Negative    Bilirubin (UA POC) Negative Negative    Ketones (UA POC) Negative Negative    Specific gravity (UA POC) 1.030 1.001 - 1.035    Blood (UA POC) Negative Negative    pH (UA POC) 5 4.6 - 8.0    Protein (UA POC) Negative Negative    Urobilinogen (UA POC) 0.2 mg/dL 0.2 - 1    Nitrites (UA POC) Negative Negative    Leukocyte esterase (UA POC) Negative Negative       Assessment/Plan:    ICD-10-CM ICD-9-CM    1. Encounter to discuss test results  Z71.2 V65.49    2. Vitamin D deficiency  E55.9 268.9 ergocalciferol (ERGOCALCIFEROL) 1,250 mcg (50,000 unit) capsule   3. Encounter for immunization  Z23 V03.89    4. Medication refill  Z76.0 V68.1 lisinopril-hydroCHLOROthiazide (PRINZIDE, ZESTORETIC) 10-12.5 mg per tablet   5. Essential hypertension  I10 401.9 lisinopril-hydroCHLOROthiazide (PRINZIDE, ZESTORETIC) 10-12.5 mg per tablet     Orders Placed This Encounter    ergocalciferol (ERGOCALCIFEROL) 1,250 mcg (50,000 unit) capsule     Sig: Take 1 Cap by mouth every seven (7) days. Dispense:  4 Cap     Refill:  2    lisinopril-hydroCHLOROthiazide (PRINZIDE, ZESTORETIC) 10-12.5 mg per tablet     Sig: TAKE 1 TABLET BY MOUTH EVERY DAY     Dispense:  30 Tab     Refill:  2     Patient Instructions        Learning About High Blood Pressure  What is high blood pressure? Blood pressure is a measure of how hard the blood pushes against the walls of your arteries. It's normal for blood pressure to go up and down throughout the day. But if it stays up, you have high blood pressure. Another name for high blood pressure is hypertension. Two numbers tell you your blood pressure. The first number is the systolic pressure (top number). It shows how hard the blood pushes when your heart is pumping.  The second number is the diastolic pressure (bottom number). It shows how hard the blood pushes between heartbeats, when your heart is relaxed and filling with blood. Your doctor will give you a goal for your blood pressure based on your health and your age. High blood pressure (hypertension) means that the top number stays high, or the bottom number stays high, or both. High blood pressure increases the risk of stroke, heart attack, and other problems. What happens when you have high blood pressure? · Blood flows through your arteries with too much force. Over time, this can damage the heart and the walls of your arteries. But you can't feel it. High blood pressure usually doesn't cause symptoms. · High blood pressure makes your heart work harder. And that can lead to heart failure, which means your heart doesn't pump as much blood as your body needs. · Fat and calcium start to build up in your arteries. This buildup is called hardening of the arteries. It can cause many problems including a heart attack and stroke. · Arteries also carry blood and oxygen to organs like your eyes, kidneys, and brain. If high blood pressure damages those arteries, it can lead to vision loss, kidney disease, stroke, and a higher risk of dementia. How can you prevent high blood pressure? · Stay at a healthy weight. · Try to limit how much sodium you eat to less than 2,300 milligrams (mg) a day. If you limit your sodium to 1,500 mg a day, you can lower your blood pressure even more. ? Buy foods that are labeled \"unsalted,\" \"sodium-free,\" or \"low-sodium. \" Foods labeled \"reduced-sodium\" and \"light sodium\" may still have too much sodium. ? Flavor your food with garlic, lemon juice, onion, vinegar, herbs, and spices instead of salt. Do not use soy sauce, steak sauce, onion salt, garlic salt, mustard, or ketchup on your food. ? Use less salt (or none) when recipes call for it.  You can often use half the salt a recipe calls for without losing flavor. · Be physically active. Get at least 30 minutes of exercise on most days of the week. Walking is a good choice. You also may want to do other activities, such as running, swimming, cycling, or playing tennis or team sports. · Limit alcohol to 2 drinks a day for men and 1 drink a day for women. · Eat plenty of fruits, vegetables, and low-fat dairy products. Eat less saturated and total fats. How is high blood pressure treated? · Your doctor will suggest making lifestyle changes to help your heart. For example, your doctor may ask you to eat healthy foods, quit smoking, lose extra weight, and be more active. · If lifestyle changes don't help enough, your doctor may recommend that you take medicine. · When blood pressure is very high, medicines are needed to lower it. Follow-up care is a key part of your treatment and safety. Be sure to make and go to all appointments, and call your doctor if you are having problems. It's also a good idea to know your test results and keep a list of the medicines you take. Where can you learn more? Go to http://lidiaBoxCatglenis.info/  Enter P501 in the search box to learn more about \"Learning About High Blood Pressure. \"  Current as of: December 16, 2019               Content Version: 12.6  © 0137-8776 [employer]. Care instructions adapted under license by [employer] (which disclaims liability or warranty for this information). If you have questions about a medical condition or this instruction, always ask your healthcare professional. [place] [address] any warranty or liability for your use of this information. Follow-up and Dispositions    · Return in about 3 months (around 3/3/2021), or if symptoms worsen or fail to improve, for 3 month f/u & Labs (HTN, Hyperlipdemia, PAP smear). I have reviewed with the patient details of the assessment and plan and all questions were answered.  Relevent patient education was performed. The most recent lab findings were reviewed with the patient. An After Visit Summary was printed and given to the patient.

## 2020-12-03 NOTE — PROGRESS NOTES
Chief Complaint   Patient presents with    Results    Hypertension     1. Have you been to the ER, urgent care clinic since your last visit? Hospitalized since your last visit? No    2. Have you seen or consulted any other health care providers outside of the [address] [place] [address] since your last visit? Include any pap smears or colon screening.  No

## 2021-03-03 DIAGNOSIS — E55.9 VITAMIN D DEFICIENCY: ICD-10-CM

## 2021-03-03 RX ORDER — ERGOCALCIFEROL 1.25 MG/1
50000 CAPSULE ORAL
Qty: 4 CAP | Refills: 2 | Status: SHIPPED | OUTPATIENT
Start: 2021-03-03 | End: 2021-04-09

## 2021-03-03 NOTE — TELEPHONE ENCOUNTER
Patient request for medication was received, reviewed and approved. Patient can pick medication up from pharmacy listed on file.

## 2021-04-07 DIAGNOSIS — Z76.0 MEDICATION REFILL: ICD-10-CM

## 2021-04-07 DIAGNOSIS — E55.9 VITAMIN D DEFICIENCY: ICD-10-CM

## 2021-04-07 DIAGNOSIS — I10 ESSENTIAL HYPERTENSION: ICD-10-CM

## 2021-04-07 NOTE — TELEPHONE ENCOUNTER
PCP: Reid Baker NP    Last appt: 12/3/2020  Future Appointments   Date Time Provider Lakshmi Flores   4/20/2021  3:30 PM Saida Milan NP [facility] BS AMB       Requested Prescriptions      No prescriptions requested or ordered in this encounter       Prior labs and Blood pressures:  BP Readings from Last 3 Encounters:   12/03/20 (!) 140/80   11/19/20 (!) 162/76   08/01/19 (!) 198/109     Lab Results   Component Value Date/Time    Sodium 139 11/19/2020 09:53 AM    Potassium 3.9 11/19/2020 09:53 AM    Chloride 107 11/19/2020 09:53 AM    CO2 27 11/19/2020 09:53 AM    Anion gap 5 11/19/2020 09:53 AM    Glucose 101 (H) 11/19/2020 09:53 AM    BUN 10 11/19/2020 09:53 AM    Creatinine 0.77 11/19/2020 09:53 AM    BUN/Creatinine ratio 13 11/19/2020 09:53 AM    GFR est AA >60 11/19/2020 09:53 AM    GFR est non-AA >60 11/19/2020 09:53 AM    Calcium 9.4 11/19/2020 09:53 AM     Lab Results   Component Value Date/Time    Hemoglobin A1c 5.6 05/02/2018 11:56 AM    Hemoglobin A1c (POC) 5.3 11/19/2020 09:11 AM     Lab Results   Component Value Date/Time    Cholesterol, total 249 (H) 05/02/2018 11:56 AM    Cholesterol (POC) 270 12/03/2020 11:59 AM    HDL Cholesterol 55 05/02/2018 11:56 AM    HDL Cholesterol (POC) 73 12/03/2020 11:59 AM    LDL Cholesterol (POC) 182 12/03/2020 11:59 AM    LDL, calculated 157 (H) 05/02/2018 11:56 AM    VLDL, calculated 37 05/02/2018 11:56 AM    Triglyceride 187 (H) 05/02/2018 11:56 AM    Triglycerides (POC) 80 12/03/2020 11:59 AM     Lab Results   Component Value Date/Time    Vitamin D 25-Hydroxy <9.0 (L) 11/19/2020 09:53 AM       No results found for: TSH, TSH2, TSH3, TSHP, TSHEXT    *Patient states she had to go out of town for her job. States she'll be in [place] for at least a week and a half. Would like her meds to be sent to the [address] there.     Rescheduled her appointment to 4/20/2021

## 2021-04-09 DIAGNOSIS — Z76.0 MEDICATION REFILL: ICD-10-CM

## 2021-04-09 DIAGNOSIS — I10 ESSENTIAL HYPERTENSION: ICD-10-CM

## 2021-04-09 RX ORDER — LISINOPRIL AND HYDROCHLOROTHIAZIDE 10; 12.5 MG/1; MG/1
TABLET ORAL
Qty: 30 TAB | Refills: 2 | Status: CANCELLED | OUTPATIENT
Start: 2021-04-09

## 2021-04-09 RX ORDER — LISINOPRIL AND HYDROCHLOROTHIAZIDE 10; 12.5 MG/1; MG/1
TABLET ORAL
Qty: 30 TAB | Refills: 0 | Status: SHIPPED | OUTPATIENT
Start: 2021-04-09 | End: 2021-05-18 | Stop reason: SDUPTHER

## 2021-04-09 RX ORDER — ERGOCALCIFEROL 1.25 MG/1
50000 CAPSULE ORAL
Qty: 4 CAP | Refills: 0 | Status: SHIPPED | OUTPATIENT
Start: 2021-04-09 | End: 2021-05-18 | Stop reason: SDUPTHER

## 2021-04-09 NOTE — TELEPHONE ENCOUNTER
Last visit 12/03/202 JENNY Milan   Next appointment 04/20/2021 JENNY Milan   Previous refill encounter(s)   12/03/2020 Prinzide #30 with 2 refills     Requested Prescriptions     Pending Prescriptions Disp Refills    lisinopril-hydroCHLOROthiazide (PRINZIDE, ZESTORETIC) 10-12.5 mg per tablet 30 Tab 2     Sig: TAKE 1 TABLET BY MOUTH EVERY DAY

## 2021-04-09 NOTE — TELEPHONE ENCOUNTER
----- Message from Natty Toscano sent at 4/9/2021 10:56 AM EDT -----  Regarding: FW: Dorothy Crossdarío Castellon/Medication Refill    ----- Message -----  From: Katherin Tyler  Sent: 4/9/2021  10:10 AM EDT  To: [facility] Office [place]  Subject: Dorothy Castellon/Medication Refill                 Medication Refill    Caller (if not patient): Pt      Relationship of caller (if not patient): Pt      Best contact number(s): [phone]      Name of medication and dosage if known: \"lisinopril-hydroCHLOROthiazide (PRINZIDE, ZESTORETIC) 10-12.5 mg per tablet \"      Is patient out of this medication (yes/no): yes      Pharmacy name: [facility] listed in chart? (yes/no): no  Pharmacy phone number: [phone]      Details to clarify the request: [place]/pt states that she has not heard anything pertaining to her initial request/pt is out of the medication as of 2 days ago      [facility], [facility]

## 2021-04-09 NOTE — TELEPHONE ENCOUNTER
Pt requesting her medication refills be sent to a different pharmacy, just one time.   Meds needing refills are;                      lisinopril-hydrochlorothiazide  ergocalciferol (ERGOCALCIFEROL) 1,250 mcg (50,000 unit) capsule    Last OV 12/3/2020  Has appt 4/20/2021

## 2021-05-18 ENCOUNTER — VIRTUAL VISIT (OUTPATIENT)
Dept: INTERNAL MEDICINE CLINIC | Age: 61
End: 2021-05-18
Payer: COMMERCIAL

## 2021-05-18 DIAGNOSIS — I10 ESSENTIAL HYPERTENSION: ICD-10-CM

## 2021-05-18 DIAGNOSIS — Z76.0 ENCOUNTER FOR MEDICATION REFILL: Primary | ICD-10-CM

## 2021-05-18 DIAGNOSIS — E55.9 VITAMIN D DEFICIENCY: ICD-10-CM

## 2021-05-18 PROCEDURE — 99214 OFFICE O/P EST MOD 30 MIN: CPT | Performed by: NURSE PRACTITIONER

## 2021-05-18 RX ORDER — LISINOPRIL AND HYDROCHLOROTHIAZIDE 10; 12.5 MG/1; MG/1
TABLET ORAL
Qty: 30 TAB | Refills: 0 | Status: SHIPPED | OUTPATIENT
Start: 2021-05-18 | End: 2021-06-14 | Stop reason: SDUPTHER

## 2021-05-18 RX ORDER — ERGOCALCIFEROL 1.25 MG/1
50000 CAPSULE ORAL
Qty: 4 CAP | Refills: 0 | Status: SHIPPED | OUTPATIENT
Start: 2021-05-18 | End: 2021-06-21 | Stop reason: SDUPTHER

## 2021-05-18 NOTE — PATIENT INSTRUCTIONS
High Blood Pressure: Care Instructions Overview It's normal for blood pressure to go up and down throughout the day. But if it stays up, you have high blood pressure. Another name for high blood pressure is hypertension. Despite what a lot of people think, high blood pressure usually doesn't cause headaches or make you feel dizzy or lightheaded. It usually has no symptoms. But it does increase your risk of stroke, heart attack, and other problems. You and your doctor will talk about your risks of these problems based on your blood pressure. Your doctor will give you a goal for your blood pressure. Your goal will be based on your health and your age. Lifestyle changes, such as eating healthy and being active, are always important to help lower blood pressure. You might also take medicine to reach your blood pressure goal. 
Follow-up care is a key part of your treatment and safety. Be sure to make and go to all appointments, and call your doctor if you are having problems. It's also a good idea to know your test results and keep a list of the medicines you take. How can you care for yourself at home? Medical treatment · If you stop taking your medicine, your blood pressure will go back up. You may take one or more types of medicine to lower your blood pressure. Be safe with medicines. Take your medicine exactly as prescribed. Call your doctor if you think you are having a problem with your medicine. · Talk to your doctor before you start taking aspirin every day. Aspirin can help certain people lower their risk of a heart attack or stroke. But taking aspirin isn't right for everyone, because it can cause serious bleeding. · See your doctor regularly. You may need to see the doctor more often at first or until your blood pressure comes down. · If you are taking blood pressure medicine, talk to your doctor before you take decongestants or anti-inflammatory medicine, such as ibuprofen.  Some of these medicines can raise blood pressure. · Learn how to check your blood pressure at home. Lifestyle changes · Stay at a healthy weight. This is especially important if you put on weight around the waist. Losing even 10 pounds can help you lower your blood pressure. · If your doctor recommends it, get more exercise. Walking is a good choice. Bit by bit, increase the amount you walk every day. Try for at least 30 minutes on most days of the week. You also may want to swim, bike, or do other activities. · Avoid or limit alcohol. Talk to your doctor about whether you can drink any alcohol. · Try to limit how much sodium you eat to less than 2,300 milligrams (mg) a day. Your doctor may ask you to try to eat less than 1,500 mg a day. · Eat plenty of fruits (such as bananas and oranges), vegetables, legumes, whole grains, and low-fat dairy products. · Lower the amount of saturated fat in your diet. Saturated fat is found in animal products such as milk, cheese, and meat. Limiting these foods may help you lose weight and also lower your risk for heart disease. · Do not smoke. Smoking increases your risk for heart attack and stroke. If you need help quitting, talk to your doctor about stop-smoking programs and medicines. These can increase your chances of quitting for good. When should you call for help? Call  911 anytime you think you may need emergency care. This may mean having symptoms that suggest that your blood pressure is causing a serious heart or blood vessel problem. Your blood pressure may be over 180/120. For example, call 911 if: 
  · You have symptoms of a heart attack. These may include: 
? Chest pain or pressure, or a strange feeling in the chest. 
? Sweating. ? Shortness of breath. ? Nausea or vomiting. ? Pain, pressure, or a strange feeling in the back, neck, jaw, or upper belly or in one or both shoulders or arms. ? Lightheadedness or sudden weakness.  
? A fast or irregular heartbeat.  
  · You have symptoms of a stroke. These may include: 
? Sudden numbness, tingling, weakness, or loss of movement in your face, arm, or leg, especially on only one side of your body. ? Sudden vision changes. ? Sudden trouble speaking. ? Sudden confusion or trouble understanding simple statements. ? Sudden problems with walking or balance. ? A sudden, severe headache that is different from past headaches.  
  · You have severe back or belly pain. Do not wait until your blood pressure comes down on its own. Get help right away. Call your doctor now or seek immediate care if: 
  · Your blood pressure is much higher than normal (such as 180/120 or higher), but you don't have symptoms.  
  · You think high blood pressure is causing symptoms, such as: 
? Severe headache. 
? Blurry vision. Watch closely for changes in your health, and be sure to contact your doctor if: 
  · Your blood pressure measures higher than your doctor recommends at least 2 times. That means the top number is higher or the bottom number is higher, or both.  
  · You think you may be having side effects from your blood pressure medicine. Where can you learn more? Go to http://www.gray.com/ Enter K883 in the search box to learn more about \"High Blood Pressure: Care Instructions. \" Current as of: August 31, 2020               Content Version: 12.8 © 2006-2021 [employer]. Care instructions adapted under license by [employer] (which disclaims liability or warranty for this information). If you have questions about a medical condition or this instruction, always ask your healthcare professional. [place] [address] any warranty or liability for your use of this information. Learning About Vitamin D Why is it important to get enough vitamin D? Your body needs vitamin D to absorb calcium. Calcium keeps your bones and muscles, including your heart, healthy and strong. If your muscles don't get enough calcium, they can cramp, hurt, or feel weak. You may have long-term (chronic) muscle aches and pains. If you don't get enough vitamin D throughout life, you have an increased chance of having thin and brittle bones (osteoporosis) in your later years. Children who don't get enough vitamin D may not grow as much as others their age. They also have a chance of getting a rare disease called rickets. It causes weak bones. Vitamin D and calcium are added to many foods. And your body uses sunshine to make its own vitamin D. How much vitamin D do you need? The recommended daily allowance (RDA) for vitamin D is 600 IU (international units) every day for people ages 3 through 79. Adults 71 and older need 800 IU every day. Blood tests for vitamin D can check your vitamin D level. But there is no standard normal range used by all laboratories. You're likely getting enough vitamin D if your levels are in the range of 20 to 50 ng/mL. How can you get more vitamin D? Foods that contain vitamin D include: 
· [place], tuna, and mackerel. These are some of the best foods to eat when you need to get more vitamin D. 
· Cheese, egg yolks, and beef liver. These foods have vitamin D in small amounts. · Milk, soy drinks, orange juice, yogurt, margarine, and some kinds of cereal have vitamin D added to them. Some people don't make vitamin D as well as others. They may have to take extra care in getting enough vitamin D. Things that reduce how much vitamin D your body makes include: · Dark skin, such as many  Americans have. · Age, especially if you are older than 72. · Digestive problems, such as Crohn's or celiac disease. · Liver and kidney disease. Some people who do not get enough vitamin D may need supplements. Are there any risks from taking vitamin D? 
· Too much vitamin D: 
? Can damage your kidneys. ? Can cause nausea and vomiting, constipation, and weakness.  
? Raises the amount of calcium in your blood. If this happens, you can get confused or have an irregular heart rhythm. · Vitamin D may interact with other medicines. Tell your doctor about all of the medicines you take, including over-the-counter drugs, herbs, and pills. Tell your doctor about all of your current medical problems. Where can you learn more? Go to http://www.Se[employer]ore/ Enter [id number] in the search box to learn more about \"Learning About Vitamin D.\" 
Current as of: December 17, 2020               Content Version: 12.8 © 6321-5376 [employer]. Care instructions adapted under license by [employer] (which disclaims liability or warranty for this information). If you have questions about a medical condition or this instruction, always ask your healthcare professional. [address] any warranty or liability for your use of this information.

## 2021-05-18 NOTE — PROGRESS NOTES
Chief Complaint   Patient presents with    Medication Refill     1. Have you been to the ER, urgent care clinic since your last visit? Hospitalized since your last visit? No    2. Have you seen or consulted any other health care providers outside of the [address] [place] [address] since your last visit? Include any pap smears or colon screening.  No

## 2021-05-22 NOTE — PROGRESS NOTES
Subjective:  Belen Habermann is a 62 y.o. female and presents with Medication Refill  . Hypertension Review:  The patient has essential hypertension. Diet and Lifestyle: generally follows a  low sodium diet, exercises sporadically  Home BP Monitoring: is not measured at home. Pertinent ROS: taking medications as instructed, no medication side effects noted, no TIA's, no chest pain on exertion, no dyspnea on exertion, no swelling of ankles. VitaminD Deficiency Review  Patient presents with Vitamin D deficiency. Patient denies symptoms of bone pain, muscle weakness, Fatigue or other neurological symptoms. Labs were ordered at patient's last office visit to assess the need for continued  Vitamin D replacement. Review of Systems  Constitutional: negative for fevers, chills, anorexia and weight loss  Eyes:   negative for visual disturbance and irritation  ENT:   negative for tinnitus,sore throat,nasal congestion,ear pains. hoarseness  Respiratory:  negative for cough, hemoptysis, dyspnea,wheezing  CV:   negative for chest pain, palpitations, lower extremity edema  GI:   negative for nausea, vomiting, diarrhea, abdominal pain,melena  Endo:               negative for polyuria,polydipsia,polyphagia,heat intolerance  Genitourinary: negative for frequency, dysuria and hematuria  Integument:  negative for rash and pruritus  Hematologic:  negative for easy bruising and gum/nose bleeding  Musculoskel: negative for myalgias, arthralgias, back pain, muscle weakness, joint pain  Neurological:  negative for headaches, dizziness, vertigo, memory problems and gait   Behavl/Psych: negative for feelings of anxiety, depression, mood changes    Past Medical History:   Diagnosis Date    Blindness of left eye     Hypertension      Past Surgical History:   Procedure Laterality Date    HX GYN       Social History     Socioeconomic History    Marital status: UNKNOWN     Spouse name: Not on file    Number of children: Not on file  Years of education: Not on file    Highest education level: Not on file   Tobacco Use    Smoking status: Former Smoker     Packs/day: 3.00    Smokeless tobacco: Never Used    Tobacco comment: smoker for 17 years; 3 cigs a day   Substance and Sexual Activity    Alcohol use: Yes     Alcohol/week: 1.0 standard drinks     Types: 1 Glasses of wine per week     Comment: social    Drug use: No    Sexual activity: Never     Social Determinants of Health     Financial Resource Strain:     Difficulty of Paying Living Expenses:    Food Insecurity:     Worried About Running Out of Food in the Last Year:     [address] in the Last Year:    Transportation Needs:     Lack of Transportation (Medical):  Lack of Transportation (Non-Medical):    Physical Activity:     Days of Exercise per Week:     Minutes of Exercise per Session:    Stress:     Feeling of Stress :    Social Connections:     Frequency of Communication with Friends and Family:     Frequency of Social Gatherings with Friends and Family:     Attends [employer] Services:     Active Member of Clubs or Organizations:     Attends Club or Organization Meetings:     Marital Status:      Family History   Problem Relation Age of Onset    Hypertension Mother     Hypertension Father     Hypertension Sister     Hypertension Brother      Current Outpatient Medications   Medication Sig Dispense Refill    lisinopril-hydroCHLOROthiazide (PRINZIDE, ZESTORETIC) 10-12.5 mg per tablet TAKE 1 TABLET BY MOUTH EVERY DAY 30 Tab 0    ergocalciferol (ERGOCALCIFEROL) 1,250 mcg (50,000 unit) capsule Take 1 Cap by mouth every seven (7) days. 4 Cap 0     No Known Allergies    Objective: There were no vitals taken for this visit.   Physical Exam:   General appearance - alert, well appearing, and in no distress  Mental status - alert, oriented to person, place, and time    Basilia Suero is a 62 y.o. female being evaluated by a Virtual Visit (video visit) encounter to address concerns as mentioned above. Due to this being a TeleHealth encounter (During AOGUY-29 public health emergency), evaluation of the following organ systems was limited: Vitals/Constitutional/EENT/Resp/CV/GI//MS/Neuro/Skin/Heme-Lymph-Imm. Pursuant to the emergency declaration under the [facility], [address] [place] [address] and the [employer] Act, this Virtual Visit was conducted with patient's (and/or legal guardian's) consent, to reduce the risk of exposure to COVID-19 and provide necessary medical care. Services were provided through a video synchronous discussion virtually to substitute for in-person encounter. Results for orders placed or performed in visit on 12/03/20   AMB POC LIPID PROFILE   Result Value Ref Range    Cholesterol (POC) 270     Triglycerides (POC) 80     HDL Cholesterol (POC) 73     LDL Cholesterol (POC) 182 MG/DL    Non-HDL Goal (POC) 198     TChol/HDL Ratio (POC) 3.7        Assessment/Plan:    ICD-10-CM ICD-9-CM    1. Encounter for medication refill  Z76.0 V68.1 lisinopril-hydroCHLOROthiazide (PRINZIDE, ZESTORETIC) 10-12.5 mg per tablet   2. Essential hypertension  I10 401.9 lisinopril-hydroCHLOROthiazide (PRINZIDE, ZESTORETIC) 10-12.5 mg per tablet   3. Vitamin D deficiency  E55.9 268.9 ergocalciferol (ERGOCALCIFEROL) 1,250 mcg (50,000 unit) capsule     Orders Placed This Encounter    lisinopril-hydroCHLOROthiazide (PRINZIDE, ZESTORETIC) 10-12.5 mg per tablet     Sig: TAKE 1 TABLET BY MOUTH EVERY DAY     Dispense:  30 Tab     Refill:  0    ergocalciferol (ERGOCALCIFEROL) 1,250 mcg (50,000 unit) capsule     Sig: Take 1 Cap by mouth every seven (7) days. Dispense:  4 Cap     Refill:  0     Patient Instructions          High Blood Pressure: Care Instructions  Overview     It's normal for blood pressure to go up and down throughout the day.  But if it stays up, you have high blood pressure. Another name for high blood pressure is hypertension. Despite what a lot of people think, high blood pressure usually doesn't cause headaches or make you feel dizzy or lightheaded. It usually has no symptoms. But it does increase your risk of stroke, heart attack, and other problems. You and your doctor will talk about your risks of these problems based on your blood pressure. Your doctor will give you a goal for your blood pressure. Your goal will be based on your health and your age. Lifestyle changes, such as eating healthy and being active, are always important to help lower blood pressure. You might also take medicine to reach your blood pressure goal.  Follow-up care is a key part of your treatment and safety. Be sure to make and go to all appointments, and call your doctor if you are having problems. It's also a good idea to know your test results and keep a list of the medicines you take. How can you care for yourself at home? Medical treatment  · If you stop taking your medicine, your blood pressure will go back up. You may take one or more types of medicine to lower your blood pressure. Be safe with medicines. Take your medicine exactly as prescribed. Call your doctor if you think you are having a problem with your medicine. · Talk to your doctor before you start taking aspirin every day. Aspirin can help certain people lower their risk of a heart attack or stroke. But taking aspirin isn't right for everyone, because it can cause serious bleeding. · See your doctor regularly. You may need to see the doctor more often at first or until your blood pressure comes down. · If you are taking blood pressure medicine, talk to your doctor before you take decongestants or anti-inflammatory medicine, such as ibuprofen. Some of these medicines can raise blood pressure. · Learn how to check your blood pressure at home. Lifestyle changes  · Stay at a healthy weight.  This is especially important if you put on weight around the waist. Losing even 10 pounds can help you lower your blood pressure. · If your doctor recommends it, get more exercise. Walking is a good choice. Bit by bit, increase the amount you walk every day. Try for at least 30 minutes on most days of the week. You also may want to swim, bike, or do other activities. · Avoid or limit alcohol. Talk to your doctor about whether you can drink any alcohol. · Try to limit how much sodium you eat to less than 2,300 milligrams (mg) a day. Your doctor may ask you to try to eat less than 1,500 mg a day. · Eat plenty of fruits (such as bananas and oranges), vegetables, legumes, whole grains, and low-fat dairy products. · Lower the amount of saturated fat in your diet. Saturated fat is found in animal products such as milk, cheese, and meat. Limiting these foods may help you lose weight and also lower your risk for heart disease. · Do not smoke. Smoking increases your risk for heart attack and stroke. If you need help quitting, talk to your doctor about stop-smoking programs and medicines. These can increase your chances of quitting for good. When should you call for help? Call  911 anytime you think you may need emergency care. This may mean having symptoms that suggest that your blood pressure is causing a serious heart or blood vessel problem. Your blood pressure may be over 180/120. For example, call 911 if:    · You have symptoms of a heart attack. These may include:  ? Chest pain or pressure, or a strange feeling in the chest.  ? Sweating. ? Shortness of breath. ? Nausea or vomiting. ? Pain, pressure, or a strange feeling in the back, neck, jaw, or upper belly or in one or both shoulders or arms. ? Lightheadedness or sudden weakness. ? A fast or irregular heartbeat.     · You have symptoms of a stroke.  These may include:  ? Sudden numbness, tingling, weakness, or loss of movement in your face, arm, or leg, especially on only one side of your body. ? Sudden vision changes. ? Sudden trouble speaking. ? Sudden confusion or trouble understanding simple statements. ? Sudden problems with walking or balance. ? A sudden, severe headache that is different from past headaches.     · You have severe back or belly pain. Do not wait until your blood pressure comes down on its own. Get help right away. Call your doctor now or seek immediate care if:    · Your blood pressure is much higher than normal (such as 180/120 or higher), but you don't have symptoms.     · You think high blood pressure is causing symptoms, such as:  ? Severe headache.  ? Blurry vision. Watch closely for changes in your health, and be sure to contact your doctor if:    · Your blood pressure measures higher than your doctor recommends at least 2 times. That means the top number is higher or the bottom number is higher, or both.     · You think you may be having side effects from your blood pressure medicine. Where can you learn more? Go to http://www.gray.com/  Enter [id number] in the search box to learn more about \"High Blood Pressure: Care Instructions. \"  Current as of: August 31, 2020               Content Version: 12.8  © 2006-2021 [employer]. Care instructions adapted under license by [employer] (which disclaims liability or warranty for this information). If you have questions about a medical condition or this instruction, always ask your healthcare professional. [address] any warranty or liability for your use of this information. Learning About Vitamin D  Why is it important to get enough vitamin D? Your body needs vitamin D to absorb calcium. Calcium keeps your bones and muscles, including your heart, healthy and strong. If your muscles don't get enough calcium, they can cramp, hurt, or feel weak. You may have long-term (chronic) muscle aches and pains.   If you don't get enough vitamin D throughout life, you have an increased chance of having thin and brittle bones (osteoporosis) in your later years. Children who don't get enough vitamin D may not grow as much as others their age. They also have a chance of getting a rare disease called rickets. It causes weak bones. Vitamin D and calcium are added to many foods. And your body uses sunshine to make its own vitamin D. How much vitamin D do you need? The recommended daily allowance (RDA) for vitamin D is 600 IU (international units) every day for people ages 3 through 79. Adults 71 and older need 800 IU every day. Blood tests for vitamin D can check your vitamin D level. But there is no standard normal range used by all laboratories. You're likely getting enough vitamin D if your levels are in the range of 20 to 50 ng/mL. How can you get more vitamin D? Foods that contain vitamin D include:  · [place], tuna, and mackerel. These are some of the best foods to eat when you need to get more vitamin D.  · Cheese, egg yolks, and beef liver. These foods have vitamin D in small amounts. · Milk, soy drinks, orange juice, yogurt, margarine, and some kinds of cereal have vitamin D added to them. Some people don't make vitamin D as well as others. They may have to take extra care in getting enough vitamin D. Things that reduce how much vitamin D your body makes include:  · Dark skin, such as many  Americans have. · Age, especially if you are older than 72. · Digestive problems, such as Crohn's or celiac disease. · Liver and kidney disease. Some people who do not get enough vitamin D may need supplements. Are there any risks from taking vitamin D?  · Too much vitamin D:  ? Can damage your kidneys. ? Can cause nausea and vomiting, constipation, and weakness. ? Raises the amount of calcium in your blood. If this happens, you can get confused or have an irregular heart rhythm. · Vitamin D may interact with other medicines.  Tell your doctor about all of the medicines you take, including over-the-counter drugs, herbs, and pills. Tell your doctor about all of your current medical problems. Where can you learn more? Go to http://www.ScreachTV.com/  Enter C337 in the search box to learn more about \"Learning About Vitamin D.\"  Current as of: December 17, 2020               Content Version: 12.8  © 2006-2021 [employer]. Care instructions adapted under license by [employer] (which disclaims liability or warranty for this information). If you have questions about a medical condition or this instruction, always ask your healthcare professional. [place] [address] any warranty or liability for your use of this information. Follow-up and Dispositions    · Return in about 2 weeks (around 6/1/2021), or if symptoms worsen or fail to improve, for Labs and Care Gaps. I have reviewed with the patient details of the assessment and plan and all questions were answered. Relevent patient education was performed. The most recent lab findings were reviewed with the patient. An After Visit Summary was printed and given to the patient.

## 2021-06-14 DIAGNOSIS — I10 ESSENTIAL HYPERTENSION: ICD-10-CM

## 2021-06-14 DIAGNOSIS — Z76.0 ENCOUNTER FOR MEDICATION REFILL: ICD-10-CM

## 2021-06-14 RX ORDER — LISINOPRIL AND HYDROCHLOROTHIAZIDE 10; 12.5 MG/1; MG/1
TABLET ORAL
Qty: 30 TABLET | Refills: 2 | Status: SHIPPED | OUTPATIENT
Start: 2021-06-14 | End: 2021-10-06 | Stop reason: SDUPTHER

## 2021-06-14 NOTE — TELEPHONE ENCOUNTER
Last Visit: Virtual Visit 5/18/21 JENNY [place]   Next Appointment: 6/21/21 JENNY [place]  Previous Refill Encounter(s): 5/18/21 30    Requested Prescriptions     Pending Prescriptions Disp Refills    lisinopril-hydroCHLOROthiazide (PRINZIDE, ZESTORETIC) 10-12.5 mg per tablet 30 Tablet 0     Sig: TAKE 1 TABLET BY MOUTH EVERY DAY

## 2021-06-21 ENCOUNTER — OFFICE VISIT (OUTPATIENT)
Dept: INTERNAL MEDICINE CLINIC | Age: 61
End: 2021-06-21
Payer: COMMERCIAL

## 2021-06-21 VITALS
WEIGHT: 202 LBS | SYSTOLIC BLOOD PRESSURE: 126 MMHG | DIASTOLIC BLOOD PRESSURE: 86 MMHG | OXYGEN SATURATION: 97 % | TEMPERATURE: 98.1 F | BODY MASS INDEX: 31.71 KG/M2 | HEIGHT: 67 IN | HEART RATE: 86 BPM | RESPIRATION RATE: 16 BRPM

## 2021-06-21 DIAGNOSIS — R73.01 IMPAIRED FASTING GLUCOSE: ICD-10-CM

## 2021-06-21 DIAGNOSIS — I10 ESSENTIAL HYPERTENSION: ICD-10-CM

## 2021-06-21 DIAGNOSIS — R68.89 FLUCTUATION OF WEIGHT: ICD-10-CM

## 2021-06-21 DIAGNOSIS — Z00.00 ENCOUNTER FOR ROUTINE ADULT MEDICAL EXAMINATION: Primary | ICD-10-CM

## 2021-06-21 DIAGNOSIS — E78.2 MIXED HYPERLIPIDEMIA: ICD-10-CM

## 2021-06-21 DIAGNOSIS — F32.A DEPRESSION, UNSPECIFIED DEPRESSION TYPE: ICD-10-CM

## 2021-06-21 DIAGNOSIS — E55.9 VITAMIN D DEFICIENCY: ICD-10-CM

## 2021-06-21 LAB
CHOLEST SERPL-MCNC: 240 MG/DL
HDLC SERPL-MCNC: 59 MG/DL
LDL CHOLESTEROL POC: NORMAL MG/DL
NON-HDL CHOLESTEROL, 011976: 182
TCHOL/HDL RATIO (POC): 4.1
TRIGL SERPL-MCNC: 460 MG/DL

## 2021-06-21 PROCEDURE — 99396 PREV VISIT EST AGE 40-64: CPT | Performed by: NURSE PRACTITIONER

## 2021-06-21 PROCEDURE — 80061 LIPID PANEL: CPT | Performed by: NURSE PRACTITIONER

## 2021-06-21 RX ORDER — ERGOCALCIFEROL 1.25 MG/1
50000 CAPSULE ORAL
Qty: 4 CAPSULE | Refills: 2 | Status: SHIPPED | OUTPATIENT
Start: 2021-06-21 | End: 2021-10-06 | Stop reason: SDUPTHER

## 2021-06-21 NOTE — PROGRESS NOTES
1. Have you been to the ER, urgent care clinic since your last visit? Hospitalized since your last visit?no    2. Have you seen or consulted any other health care providers outside of the [address] [place] [address] since your last visit? Include any pap smears or colon screening. No    Chief Complaint   Patient presents with    Hypertension    Vitamin D Deficiency       verbal order given for needed amb poc labs.     3 most recent PHQ Screens 6/21/2021   Little interest or pleasure in doing things Not at all   Feeling down, depressed, irritable, or hopeless Not at all   Total Score PHQ 2 0

## 2021-06-21 NOTE — PATIENT INSTRUCTIONS
Recovering From Depression: Care Instructions Your Care Instructions Taking good care of yourself is important as you recover from depression. In time, your symptoms will fade as your treatment takes hold. Do not give up. Instead, focus your energy on getting better. Your mood will improve. It just takes some time. Focus on things that can help you feel better, such as being with friends and family, eating well, and getting enough rest. But take things slowly. Do not do too much too soon. You will begin to feel better gradually. Follow-up care is a key part of your treatment and safety. Be sure to make and go to all appointments, and call your doctor if you are having problems. It's also a good idea to know your test results and keep a list of the medicines you take. How can you care for yourself at home? Be realistic · If you have a large task to do, break it up into smaller steps you can handle, and just do what you can. · You may want to put off important decisions until your depression has lifted. If you have plans that will have a major impact on your life, such as marriage, divorce, or a job change, try to wait a bit. Talk it over with friends and loved ones who can help you look at the overall picture first. 
· Reaching out to people for help is important. Do not isolate yourself. Let your family and friends help you. Find someone you can trust and confide in, and talk to that person. · Be patient, and be kind to yourself. Remember that depression is not your fault and is not something you can overcome with willpower alone. Treatment is important for depression, just like for any other illness. Feeling better takes time, and your mood will improve little by little. Stay active · Stay busy and get outside. Take a walk, or try some other light exercise. · Talk with your doctor about an exercise program. Exercise can help with mild depression. · Go to a movie or concert.  Take part in a [employer] activity or other social gathering. Go to a [employer] game. · Ask a friend to have dinner with you. Take care of yourself · Eat a balanced diet with plenty of fresh fruits and vegetables, whole grains, and lean protein. If you have lost your appetite, eat small snacks rather than large meals. · Avoid using illegal drugs or marijuana and drinking alcohol. Do not take medicines that have not been prescribed for you. They may interfere with medicines you may be taking for depression, or they may make your depression worse. · Take your medicines exactly as they are prescribed. You may start to feel better within 1 to 3 weeks of taking antidepressant medicine. But it can take as many as 6 to 8 weeks to see more improvement. If you have questions or concerns about your medicines, or if you do not notice any improvement by 3 weeks, talk to your doctor. · Continue to take your medicine after your symptoms improve. Taking your medicine for at least 6 months after you feel better can help keep you from getting depressed again. If this isn't the first time you have been depressed, your doctor may recommend you to take medicine even longer. · If you have any side effects from your medicine, tell your doctor. Many side effects are mild and will go away on their own after you have been taking the medicine for a few weeks. Some may last longer. Talk to your doctor if side effects are bothering you too much. You might be able to try a different medicine. · Continue counseling. It may help prevent depression from returning, especially if you've had multiple episodes of depression. Talk with your counselor if you are having a hard time attending your sessions or you think the sessions aren't working. Don't just stop going. · Get enough sleep. Talk to your doctor if you are having problems sleeping. · Avoid sleeping pills unless they are prescribed by the doctor treating your depression.  Sleeping pills may make you groggy during the day, and they may interact with other medicine you are taking. · If you have any other illnesses, such as diabetes, heart disease, or high blood pressure, make sure to continue with your treatment. Tell your doctor about all of the medicines you take, including those with or without a prescription. · If you or someone you know talks about suicide, self-harm, or feeling hopeless, get help right away. Call the [address] [place] [address] at 1-800-273-talk (3-547.698.9293) or text HOME to 683412 to access the Crisis Text Line. Consider saving these numbers in your phone. When should you call for help? Call 911 anytime you think you may need emergency care. For example, call if: 
  · You feel like hurting yourself or someone else.  
  · Someone you know has depression and is about to attempt or is attempting suicide. Call your doctor now or seek immediate medical care if: 
  · You hear voices.  
  · Someone you know has depression and: 
? Starts to give away his or her possessions. ? Uses illegal drugs or drinks alcohol heavily. ? Talks or writes about death, including writing suicide notes or talking about guns, knives, or pills. ? Starts to spend a lot of time alone. ? Acts very aggressively or suddenly appears calm. Watch closely for changes in your health, and be sure to contact your doctor if: 
  · You do not get better as expected. Where can you learn more? Go to http://www.gray.com/ Enter M679 in the search box to learn more about \"Recovering From Depression: Care Instructions. \" Current as of: September 23, 2020               Content Version: 12.8 © 8043-5348 [employer]. Care instructions adapted under license by [employer] (which disclaims liability or warranty for this information).  If you have questions about a medical condition or this instruction, always ask your healthcare professional. Steph Walker disclaims any warranty or liability for your use of this information. Learning About High Cholesterol What is high cholesterol? High cholesterol means that you have too much cholesterol in your blood. Cholesterol is a type of fat. It's needed for many body functions, such as making new cells. Cholesterol is made by your body. It also comes from food you eat. Having high cholesterol can lead to the buildup of plaque in artery walls. This can increase your risk of heart disease and stroke. When your doctor talks about high cholesterol levels, he or she is talking about your total cholesterol and LDL cholesterol (the \"bad\" cholesterol) levels. Your doctor may also speak about HDL (the \"good\" cholesterol) levels. High HDL is linked with a lower risk for heart disease, heart attack, and stroke. Your cholesterol levels help your doctor find out your risk for having a heart attack or stroke. How can you prevent high cholesterol? A heart-healthy lifestyle can help you prevent high cholesterol. This lifestyle helps lower your risk for a heart attack and stroke. · Eat heart-healthy foods. ? Eat fruits, vegetables, whole grains (like oatmeal), dried beans and peas, nuts and seeds, soy products (like tofu), and fat-free or low-fat dairy products. ? Replace butter, margarine, and hydrogenated or partially hydrogenated oils with olive and canola oils. (Canola oil margarine without trans fat is fine.) ? Replace red meat with fish, poultry, and soy protein (like tofu). ? Limit processed and packaged foods like chips, crackers, and cookies. · Be active. Exercise can improve your cholesterol level. Get at least 30 minutes of exercise on most days of the week. Walking is a good choice. You also may want to do other activities, such as running, swimming, cycling, or playing tennis or team sports. · Stay at a healthy weight. Lose weight if you need to. · Don't smoke.  If you need help quitting, talk to your doctor about stop-smoking programs and medicines. These can increase your chances of quitting for good. How is high cholesterol treated? The goal of treatment is to reduce your chances of having a heart attack or stroke. The goal is not to lower your cholesterol numbers only. · You may make lifestyle changes, such as eating healthy foods, not smoking, losing weight, and being more active. · You may have to take medicine. Follow-up care is a key part of your treatment and safety. Be sure to make and go to all appointments, and call your doctor if you are having problems. It's also a good idea to know your test results and keep a list of the medicines you take. Where can you learn more? Go to http://www.gray.com/ Enter P998 in the search box to learn more about \"Learning About High Cholesterol. \" Current as of: August 31, 2020               Content Version: 12.8 © 3901-8395 [employer]. Care instructions adapted under license by [employer] (which disclaims liability or warranty for this information). If you have questions about a medical condition or this instruction, always ask your healthcare professional. [place] [address] any warranty or liability for your use of this information. High Blood Pressure: Care Instructions Overview It's normal for blood pressure to go up and down throughout the day. But if it stays up, you have high blood pressure. Another name for high blood pressure is hypertension. Despite what a lot of people think, high blood pressure usually doesn't cause headaches or make you feel dizzy or lightheaded. It usually has no symptoms. But it does increase your risk of stroke, heart attack, and other problems. You and your doctor will talk about your risks of these problems based on your blood pressure. Your doctor will give you a goal for your blood pressure.  Your goal will be based on your health and your age. 
Lifestyle changes, such as eating healthy and being active, are always important to help lower blood pressure. You might also take medicine to reach your blood pressure goal. 
Follow-up care is a key part of your treatment and safety. Be sure to make and go to all appointments, and call your doctor if you are having problems. It's also a good idea to know your test results and keep a list of the medicines you take. How can you care for yourself at home? Medical treatment · If you stop taking your medicine, your blood pressure will go back up. You may take one or more types of medicine to lower your blood pressure. Be safe with medicines. Take your medicine exactly as prescribed. Call your doctor if you think you are having a problem with your medicine. · Talk to your doctor before you start taking aspirin every day. Aspirin can help certain people lower their risk of a heart attack or stroke. But taking aspirin isn't right for everyone, because it can cause serious bleeding. · See your doctor regularly. You may need to see the doctor more often at first or until your blood pressure comes down. · If you are taking blood pressure medicine, talk to your doctor before you take decongestants or anti-inflammatory medicine, such as ibuprofen. Some of these medicines can raise blood pressure. · Learn how to check your blood pressure at home. Lifestyle changes · Stay at a healthy weight. This is especially important if you put on weight around the waist. Losing even 10 pounds can help you lower your blood pressure. · If your doctor recommends it, get more exercise. Walking is a good choice. Bit by bit, increase the amount you walk every day. Try for at least 30 minutes on most days of the week. You also may want to swim, bike, or do other activities. · Avoid or limit alcohol. Talk to your doctor about whether you can drink any alcohol.  
· Try to limit how much sodium you eat to less than 2,300 milligrams (mg) a day. Your doctor may ask you to try to eat less than 1,500 mg a day. · Eat plenty of fruits (such as bananas and oranges), vegetables, legumes, whole grains, and low-fat dairy products. · Lower the amount of saturated fat in your diet. Saturated fat is found in animal products such as milk, cheese, and meat. Limiting these foods may help you lose weight and also lower your risk for heart disease. · Do not smoke. Smoking increases your risk for heart attack and stroke. If you need help quitting, talk to your doctor about stop-smoking programs and medicines. These can increase your chances of quitting for good. When should you call for help? Call  911 anytime you think you may need emergency care. This may mean having symptoms that suggest that your blood pressure is causing a serious heart or blood vessel problem. Your blood pressure may be over 180/120. For example, call 911 if: 
  · You have symptoms of a heart attack. These may include: 
? Chest pain or pressure, or a strange feeling in the chest. 
? Sweating. ? Shortness of breath. ? Nausea or vomiting. ? Pain, pressure, or a strange feeling in the back, neck, jaw, or upper belly or in one or both shoulders or arms. ? Lightheadedness or sudden weakness. ? A fast or irregular heartbeat.  
  · You have symptoms of a stroke. These may include: 
? Sudden numbness, tingling, weakness, or loss of movement in your face, arm, or leg, especially on only one side of your body. ? Sudden vision changes. ? Sudden trouble speaking. ? Sudden confusion or trouble understanding simple statements. ? Sudden problems with walking or balance. ? A sudden, severe headache that is different from past headaches.  
  · You have severe back or belly pain. Do not wait until your blood pressure comes down on its own. Get help right away.  
Call your doctor now or seek immediate care if: 
  · Your blood pressure is much higher than normal (such as 180/120 or higher), but you don't have symptoms.  
  · You think high blood pressure is causing symptoms, such as: 
? Severe headache. 
? Blurry vision. Watch closely for changes in your health, and be sure to contact your doctor if: 
  · Your blood pressure measures higher than your doctor recommends at least 2 times. That means the top number is higher or the bottom number is higher, or both.  
  · You think you may be having side effects from your blood pressure medicine. Where can you learn more? Go to http://www.gray.com/ Enter A629 in the search box to learn more about \"High Blood Pressure: Care Instructions. \" Current as of: August 31, 2020               Content Version: 12.8 © 2006-2021 [employer]. Care instructions adapted under license by [employer] (which disclaims liability or warranty for this information). If you have questions about a medical condition or this instruction, always ask your healthcare professional. [place] [address] any warranty or liability for your use of this information. Learning About Vitamin D Why is it important to get enough vitamin D? Your body needs vitamin D to absorb calcium. Calcium keeps your bones and muscles, including your heart, healthy and strong. If your muscles don't get enough calcium, they can cramp, hurt, or feel weak. You may have long-term (chronic) muscle aches and pains. If you don't get enough vitamin D throughout life, you have an increased chance of having thin and brittle bones (osteoporosis) in your later years. Children who don't get enough vitamin D may not grow as much as others their age. They also have a chance of getting a rare disease called rickets. It causes weak bones. Vitamin D and calcium are added to many foods. And your body uses sunshine to make its own vitamin D. How much vitamin D do you need?  
The recommended daily allowance (RDA) for vitamin D is 600 IU (international units) every day for people ages [address] [facility] through 79. Adults 71 and older need 800 IU every day. Blood tests for vitamin D can check your vitamin D level. But there is no standard normal range used by all laboratories. You're likely getting enough vitamin D if your levels are in the range of 20 to 50 ng/mL. How can you get more vitamin D? Foods that contain vitamin D include: 
· [place], tuna, and mackerel. These are some of the best foods to eat when you need to get more vitamin D. 
· Cheese, egg yolks, and beef liver. These foods have vitamin D in small amounts. · Milk, soy drinks, orange juice, yogurt, margarine, and some kinds of cereal have vitamin D added to them. Some people don't make vitamin D as well as others. They may have to take extra care in getting enough vitamin D. Things that reduce how much vitamin D your body makes include: · Dark skin, such as many  Americans have. · Age, especially if you are older than 72. · Digestive problems, such as Crohn's or celiac disease. · Liver and kidney disease. Some people who do not get enough vitamin D may need supplements. Are there any risks from taking vitamin D? 
· Too much vitamin D: 
? Can damage your kidneys. ? Can cause nausea and vomiting, constipation, and weakness. ? Raises the amount of calcium in your blood. If this happens, you can get confused or have an irregular heart rhythm. · Vitamin D may interact with other medicines. Tell your doctor about all of the medicines you take, including over-the-counter drugs, herbs, and pills. Tell your doctor about all of your current medical problems. Where can you learn more? Go to http://www.gray.com/ Enter [id number] in the search box to learn more about \"Learning About Vitamin D.\" 
Current as of: December 17, 2020               Content Version: 12.8 © 2831-4318 [employer].   
Care instructions adapted under license by [employer] (which disclaims liability or warranty for this information). If you have questions about a medical condition or this instruction, always ask your healthcare professional. [address] any warranty or liability for your use of this information.

## 2021-06-22 NOTE — PROGRESS NOTES
Subjective:  Prabha Perrin is a 62 y.o. female and presents with hyperlipidemia, Hypertension and Vitamin D Deficiency for a Routine Medical Exam. She is also presenting with new unrelated complaints of having new onset Depression. Hyperlipidemia Review:  Patient presents for follow up and evaluation of lipids. Compliance with treatment thus far has not been excellent, a repeat fasting lipid profile was done at today's office visit. Patient is not adhering to dietary recommendations. Patient states that she does not exercise regularly, was advised of exercise recommendations of 150 minutes a week. The patient does not use medications that may worsen dyslipidemias (corticosteroids, progestins, anabolic steroids, amiodarone, cyclosporine, olanzapine). Hypertension Review:  The patient has essential hypertension which is well controlled with her current management plan 126/86 at today's visit. Diet and Lifestyle: generally follows a  low sodium diet, exercises sporadically  Home BP Monitoring: is not measured at home. Pertinent ROS: taking medications as instructed, no medication side effects noted, no TIA's, no chest pain on exertion, no dyspnea on exertion, no swelling of ankles.      VitaminD Deficiency Review  Patient presents with Vitamin D deficiency. Patient denies symptoms of bone pain, muscle weakness, Fatigue or other neurological symptoms. Labs were ordered at patient's last office visit to assess the need for continued  Vitamin D replacement    Depression Review  Patient is being seen for new onset of depression. Per the patient her 22year old son has been causing her to become depressed because she is worried about him. Ongoing symptoms include depressed mood, insomnia, psychomotor agitation, psychomotor retardation, fatigue, feelings of worthlessness/guilt, difficulty concentrating and hopelessness. The patient denies recurrent thoughts of death and suicidal thoughts without plan.  Patient was offered the opportunity to talk with [facility] therapist but declined at this time, says she has someone at her job to talk to. Review of Systems  Constitutional: negative for fevers, chills, anorexia and weight loss  Eyes:   negative for visual disturbance and irritation  ENT:   negative for tinnitus,sore throat,nasal congestion,ear pains. hoarseness  Respiratory:  negative for cough, hemoptysis, dyspnea,wheezing  CV:   negative for chest pain, palpitations, lower extremity edema  GI:   negative for nausea, vomiting, diarrhea, abdominal pain,melena  Endo:               negative for polyuria,polydipsia,polyphagia,heat intolerance  Genitourinary: negative for frequency, dysuria and hematuria  Integument:  negative for rash and pruritus  Hematologic:  negative for easy bruising and gum/nose bleeding  Musculoskel: negative for myalgias, arthralgias, back pain, muscle weakness, joint pain  Neurological:  negative for headaches, dizziness, vertigo, memory problems and gait   Behavl/Psych: negative for feelings of anxiety, depression, mood changes    Past Medical History:   Diagnosis Date    Blindness of left eye     Hypertension      Past Surgical History:   Procedure Laterality Date    HX GYN       Social History     Socioeconomic History    Marital status: UNKNOWN     Spouse name: Not on file    Number of children: Not on file    Years of education: Not on file    Highest education level: Not on file   Tobacco Use    Smoking status: Former Smoker     Packs/day: 3.00    Smokeless tobacco: Never Used    Tobacco comment: smoker for 17 years; 3 cigs a day   Substance and Sexual Activity    Alcohol use:  Yes     Alcohol/week: 1.0 standard drinks     Types: 1 Glasses of wine per week     Comment: social    Drug use: No    Sexual activity: Never     Social Determinants of Health     Financial Resource Strain:     Difficulty of Paying Living Expenses:    Food Insecurity:     Worried About Running Out of Food in the Last Year:    [address] in the Last Year:    Transportation Needs:     Lack of Transportation (Medical):  Lack of Transportation (Non-Medical):    Physical Activity:     Days of Exercise per Week:     Minutes of Exercise per Session:    Stress:     Feeling of Stress :    Social Connections:     Frequency of Communication with Friends and Family:     Frequency of Social Gatherings with Friends and Family:     Attends [employer] Services:     Active Member of Clubs or Organizations:     Attends Club or Organization Meetings:     Marital Status:      Family History   Problem Relation Age of Onset    Hypertension Mother     Hypertension Father     Hypertension Sister     Hypertension Brother      Current Outpatient Medications   Medication Sig Dispense Refill    ergocalciferol (ERGOCALCIFEROL) 1,250 mcg (50,000 unit) capsule Take 1 Capsule by mouth every seven (7) days.  4 Capsule 2    lisinopril-hydroCHLOROthiazide (PRINZIDE, ZESTORETIC) 10-12.5 mg per tablet TAKE 1 TABLET BY MOUTH EVERY DAY 30 Tablet 2     No Known Allergies    Objective:  Visit Vitals  /86   Pulse 86   Temp 98.1 °F (36.7 °C) (Oral)   Resp 16   Ht 5' 7\" (1.702 m)   Wt 202 lb (91.6 kg)   SpO2 97%   BMI 31.64 kg/m²     Physical Exam:   General appearance - alert, well appearing, and in no distress  Mental status - alert, oriented to person, place, and time  EYE-PRASHANTH, EOMI, corneas normal, no foreign bodies  ENT-ENT exam normal, no neck nodes or sinus tenderness  Nose - normal and patent, no erythema, discharge or polyps  Mouth - mucous membranes moist, pharynx normal without lesions  Neck - supple, no significant adenopathy   Chest - clear to auscultation, no wheezes, rales or rhonchi, symmetric air entry   Heart - normal rate, regular rhythm, normal S1, S2, no murmurs, rubs, clicks or gallops   Abdomen - soft, nontender, nondistended, no masses or organomegaly  Lymph- no adenopathy palpable  Ext-peripheral pulses normal, no pedal edema, no clubbing or cyanosis  Skin-Warm and dry. no hyperpigmentation, vitiligo, or suspicious lesions  Neuro -alert, oriented, normal speech, no focal findings or movement disorder noted  Musculoskeletal-normal C-spine, no tenderness, full ROM without pain  Feet-no nail deformities or callus formation with good pulses noted      Results for orders placed or performed in visit on 06/21/21   AMB POC LIPID PROFILE   Result Value Ref Range    Cholesterol (POC) 240     Triglycerides (POC) 460     HDL Cholesterol (POC) 59     Non-HDL Cholesterol 182     LDL Cholesterol (POC) n/a MG/DL    TChol/HDL Ratio (POC) 4.1        Assessment/Plan:    ICD-10-CM ICD-9-CM    1. Encounter for routine adult medical examination  Z00.00 V70.0    2. Mixed hyperlipidemia  E78.2 272.2 ergocalciferol (ERGOCALCIFEROL) 1,250 mcg (50,000 unit) capsule      AMB POC LIPID PROFILE   3. Essential hypertension  I10 401.9 ergocalciferol (ERGOCALCIFEROL) 1,250 mcg (50,000 unit) capsule      AMB POC LIPID PROFILE      CBC W/O DIFF      METABOLIC PANEL, COMPREHENSIVE   4. Impaired fasting glucose  R73.01 790.21 HEMOGLOBIN A1C WITH EAG   5. Vitamin D deficiency  E55.9 268.9 ergocalciferol (ERGOCALCIFEROL) 1,250 mcg (50,000 unit) capsule      VITAMIN D, 25 HYDROXY   6. Depression, unspecified depression type  F32.9 311 VITAMIN D, 25 HYDROXY      TSH 3RD GENERATION   7.  Fluctuation of weight  R68.89 780.99 VITAMIN D, 25 HYDROXY      TSH 3RD GENERATION     Orders Placed This Encounter    CBC W/O DIFF     Standing Status:   Future     Standing Expiration Date:   0/06/5183    METABOLIC PANEL, COMPREHENSIVE     Standing Status:   Future     Standing Expiration Date:   6/21/2022    VITAMIN D, 25 HYDROXY     Standing Status:   Future     Standing Expiration Date:   6/21/2022    TSH 3RD GENERATION     Standing Status:   Future     Standing Expiration Date:   12/21/2021    HEMOGLOBIN A1C WITH EAG     Standing Status:   Future     Standing Expiration Date:   12/21/2021    AMB POC LIPID PROFILE    ergocalciferol (ERGOCALCIFEROL) 1,250 mcg (50,000 unit) capsule     Sig: Take 1 Capsule by mouth every seven (7) days. Dispense:  4 Capsule     Refill:  2     Patient Instructions          Recovering From Depression: Care Instructions  Your Care Instructions     Taking good care of yourself is important as you recover from depression. In time, your symptoms will fade as your treatment takes hold. Do not give up. Instead, focus your energy on getting better. Your mood will improve. It just takes some time. Focus on things that can help you feel better, such as being with friends and family, eating well, and getting enough rest. But take things slowly. Do not do too much too soon. You will begin to feel better gradually. Follow-up care is a key part of your treatment and safety. Be sure to make and go to all appointments, and call your doctor if you are having problems. It's also a good idea to know your test results and keep a list of the medicines you take. How can you care for yourself at home? Be realistic  · If you have a large task to do, break it up into smaller steps you can handle, and just do what you can. · You may want to put off important decisions until your depression has lifted. If you have plans that will have a major impact on your life, such as marriage, divorce, or a job change, try to wait a bit. Talk it over with friends and loved ones who can help you look at the overall picture first.  · Reaching out to people for help is important. Do not isolate yourself. Let your family and friends help you. Find someone you can trust and confide in, and talk to that person. · Be patient, and be kind to yourself. Remember that depression is not your fault and is not something you can overcome with willpower alone. Treatment is important for depression, just like for any other illness.  Feeling better takes time, and your mood will improve little by little. Stay active  · Stay busy and get outside. Take a walk, or try some other light exercise. · Talk with your doctor about an exercise program. Exercise can help with mild depression. · Go to a movie or concert. Take part in a Roman [employer] activity or other social gathering. Go to a ball game. · Ask a friend to have dinner with you. Take care of yourself  · Eat a balanced diet with plenty of fresh fruits and vegetables, whole grains, and lean protein. If you have lost your appetite, eat small snacks rather than large meals. · Avoid using illegal drugs or marijuana and drinking alcohol. Do not take medicines that have not been prescribed for you. They may interfere with medicines you may be taking for depression, or they may make your depression worse. · Take your medicines exactly as they are prescribed. You may start to feel better within 1 to 3 weeks of taking antidepressant medicine. But it can take as many as 6 to 8 weeks to see more improvement. If you have questions or concerns about your medicines, or if you do not notice any improvement by 3 weeks, talk to your doctor. · Continue to take your medicine after your symptoms improve. Taking your medicine for at least 6 months after you feel better can help keep you from getting depressed again. If this isn't the first time you have been depressed, your doctor may recommend you to take medicine even longer. · If you have any side effects from your medicine, tell your doctor. Many side effects are mild and will go away on their own after you have been taking the medicine for a few weeks. Some may last longer. Talk to your doctor if side effects are bothering you too much. You might be able to try a different medicine. · Continue counseling. It may help prevent depression from returning, especially if you've had multiple episodes of depression.  Talk with your counselor if you are having a hard time attending your sessions or you think the sessions aren't working. Don't just stop going. · Get enough sleep. Talk to your doctor if you are having problems sleeping. · Avoid sleeping pills unless they are prescribed by the doctor treating your depression. Sleeping pills may make you groggy during the day, and they may interact with other medicine you are taking. · If you have any other illnesses, such as diabetes, heart disease, or high blood pressure, make sure to continue with your treatment. Tell your doctor about all of the medicines you take, including those with or without a prescription. · If you or someone you know talks about suicide, self-harm, or feeling hopeless, get help right away. Call the [facility] at 6-267-242-ZGNP ([phone]) or text HOME to 442607 to access the [employer] Text Line. Consider saving these numbers in your phone. When should you call for help? Call 911 anytime you think you may need emergency care. For example, call if:    · You feel like hurting yourself or someone else.     · Someone you know has depression and is about to attempt or is attempting suicide. Call your doctor now or seek immediate medical care if:    · You hear voices.     · Someone you know has depression and:  ? Starts to give away his or her possessions. ? Uses illegal drugs or drinks alcohol heavily. ? Talks or writes about death, including writing suicide notes or talking about guns, knives, or pills. ? Starts to spend a lot of time alone. ? Acts very aggressively or suddenly appears calm. Watch closely for changes in your health, and be sure to contact your doctor if:    · You do not get better as expected. Where can you learn more? Go to http://www.gray.com/  Enter N529 in the search box to learn more about \"Recovering From Depression: Care Instructions. \"  Current as of: September 23, 2020               Content Version: 12.8  © 1878-0450 [employer].    Care instructions adapted under license by [employer] (which disclaims liability or warranty for this information). If you have questions about a medical condition or this instruction, always ask your healthcare professional. [address] any warranty or liability for your use of this information. Learning About High Cholesterol  What is high cholesterol? High cholesterol means that you have too much cholesterol in your blood. Cholesterol is a type of fat. It's needed for many body functions, such as making new cells. Cholesterol is made by your body. It also comes from food you eat. Having high cholesterol can lead to the buildup of plaque in artery walls. This can increase your risk of heart disease and stroke. When your doctor talks about high cholesterol levels, he or she is talking about your total cholesterol and LDL cholesterol (the \"bad\" cholesterol) levels. Your doctor may also speak about HDL (the \"good\" cholesterol) levels. High HDL is linked with a lower risk for heart disease, heart attack, and stroke. Your cholesterol levels help your doctor find out your risk for having a heart attack or stroke. How can you prevent high cholesterol? A heart-healthy lifestyle can help you prevent high cholesterol. This lifestyle helps lower your risk for a heart attack and stroke. · Eat heart-healthy foods. ? Eat fruits, vegetables, whole grains (like oatmeal), dried beans and peas, nuts and seeds, soy products (like tofu), and fat-free or low-fat dairy products. ? Replace butter, margarine, and hydrogenated or partially hydrogenated oils with olive and canola oils. (Canola oil margarine without trans fat is fine.)  ? Replace red meat with fish, poultry, and soy protein (like tofu). ? Limit processed and packaged foods like chips, crackers, and cookies. · Be active. Exercise can improve your cholesterol level. Get at least 30 minutes of exercise on most days of the week.  Walking is a good choice. You also may want to do other activities, such as running, swimming, cycling, or playing tennis or team sports. · Stay at a healthy weight. Lose weight if you need to. · Don't smoke. If you need help quitting, talk to your doctor about stop-smoking programs and medicines. These can increase your chances of quitting for good. How is high cholesterol treated? The goal of treatment is to reduce your chances of having a heart attack or stroke. The goal is not to lower your cholesterol numbers only. · You may make lifestyle changes, such as eating healthy foods, not smoking, losing weight, and being more active. · You may have to take medicine. Follow-up care is a key part of your treatment and safety. Be sure to make and go to all appointments, and call your doctor if you are having problems. It's also a good idea to know your test results and keep a list of the medicines you take. Where can you learn more? Go to http://lidiaSmart [employer]aglenis.info/  Enter Q621 in the search box to learn more about \"Learning About High Cholesterol. \"  Current as of: August 31, 2020               Content Version: 12.8  © 0140-7536 [employer]. Care instructions adapted under license by [employer] (which disclaims liability or warranty for this information). If you have questions about a medical condition or this instruction, always ask your healthcare professional. [address] any warranty or liability for your use of this information. High Blood Pressure: Care Instructions  Overview     It's normal for blood pressure to go up and down throughout the day. But if it stays up, you have high blood pressure. Another name for high blood pressure is hypertension. Despite what a lot of people think, high blood pressure usually doesn't cause headaches or make you feel dizzy or lightheaded. It usually has no symptoms.  But it does increase your risk of stroke, heart attack, and other problems. You and your doctor will talk about your risks of these problems based on your blood pressure. Your doctor will give you a goal for your blood pressure. Your goal will be based on your health and your age. Lifestyle changes, such as eating healthy and being active, are always important to help lower blood pressure. You might also take medicine to reach your blood pressure goal.  Follow-up care is a key part of your treatment and safety. Be sure to make and go to all appointments, and call your doctor if you are having problems. It's also a good idea to know your test results and keep a list of the medicines you take. How can you care for yourself at home? Medical treatment  · If you stop taking your medicine, your blood pressure will go back up. You may take one or more types of medicine to lower your blood pressure. Be safe with medicines. Take your medicine exactly as prescribed. Call your doctor if you think you are having a problem with your medicine. · Talk to your doctor before you start taking aspirin every day. Aspirin can help certain people lower their risk of a heart attack or stroke. But taking aspirin isn't right for everyone, because it can cause serious bleeding. · See your doctor regularly. You may need to see the doctor more often at first or until your blood pressure comes down. · If you are taking blood pressure medicine, talk to your doctor before you take decongestants or anti-inflammatory medicine, such as ibuprofen. Some of these medicines can raise blood pressure. · Learn how to check your blood pressure at home. Lifestyle changes  · Stay at a healthy weight. This is especially important if you put on weight around the waist. Losing even 10 pounds can help you lower your blood pressure. · If your doctor recommends it, get more exercise. Walking is a good choice. Bit by bit, increase the amount you walk every day.  Try for at least 30 minutes on most days of the week. You also may want to swim, bike, or do other activities. · Avoid or limit alcohol. Talk to your doctor about whether you can drink any alcohol. · Try to limit how much sodium you eat to less than 2,300 milligrams (mg) a day. Your doctor may ask you to try to eat less than 1,500 mg a day. · Eat plenty of fruits (such as bananas and oranges), vegetables, legumes, whole grains, and low-fat dairy products. · Lower the amount of saturated fat in your diet. Saturated fat is found in animal products such as milk, cheese, and meat. Limiting these foods may help you lose weight and also lower your risk for heart disease. · Do not smoke. Smoking increases your risk for heart attack and stroke. If you need help quitting, talk to your doctor about stop-smoking programs and medicines. These can increase your chances of quitting for good. When should you call for help? Call  911 anytime you think you may need emergency care. This may mean having symptoms that suggest that your blood pressure is causing a serious heart or blood vessel problem. Your blood pressure may be over 180/120. For example, call 911 if:    · You have symptoms of a heart attack. These may include:  ? Chest pain or pressure, or a strange feeling in the chest.  ? Sweating. ? Shortness of breath. ? Nausea or vomiting. ? Pain, pressure, or a strange feeling in the back, neck, jaw, or upper belly or in one or both shoulders or arms. ? Lightheadedness or sudden weakness. ? A fast or irregular heartbeat.     · You have symptoms of a stroke. These may include:  ? Sudden numbness, tingling, weakness, or loss of movement in your face, arm, or leg, especially on only one side of your body. ? Sudden vision changes. ? Sudden trouble speaking. ? Sudden confusion or trouble understanding simple statements. ? Sudden problems with walking or balance.   ? A sudden, severe headache that is different from past headaches.     · You have severe back or belly pain. Do not wait until your blood pressure comes down on its own. Get help right away. Call your doctor now or seek immediate care if:    · Your blood pressure is much higher than normal (such as 180/120 or higher), but you don't have symptoms.     · You think high blood pressure is causing symptoms, such as:  ? Severe headache.  ? Blurry vision. Watch closely for changes in your health, and be sure to contact your doctor if:    · Your blood pressure measures higher than your doctor recommends at least 2 times. That means the top number is higher or the bottom number is higher, or both.     · You think you may be having side effects from your blood pressure medicine. Where can you learn more? Go to http://www.gray.com/  Enter U5370444 in the search box to learn more about \"High Blood Pressure: Care Instructions. \"  Current as of: August 31, 2020               Content Version: 12.8  © 2006-2021 [email or web address]. Care instructions adapted under license by [employer] (which disclaims liability or warranty for this information). If you have questions about a medical condition or this instruction, always ask your healthcare professional. [place] [address] any warranty or liability for your use of this information. Learning About Vitamin D  Why is it important to get enough vitamin D? Your body needs vitamin D to absorb calcium. Calcium keeps your bones and muscles, including your heart, healthy and strong. If your muscles don't get enough calcium, they can cramp, hurt, or feel weak. You may have long-term (chronic) muscle aches and pains. If you don't get enough vitamin D throughout life, you have an increased chance of having thin and brittle bones (osteoporosis) in your later years. Children who don't get enough vitamin D may not grow as much as others their age.  They also have a chance of getting a rare disease called rickets. It causes weak bones. Vitamin D and calcium are added to many foods. And your body uses sunshine to make its own vitamin D. How much vitamin D do you need? The recommended daily allowance (RDA) for vitamin D is 600 IU (international units) every day for people ages 3 through 79. Adults 71 and older need 800 IU every day. Blood tests for vitamin D can check your vitamin D level. But there is no standard normal range used by all laboratories. You're likely getting enough vitamin D if your levels are in the range of 20 to 50 ng/mL. How can you get more vitamin D? Foods that contain vitamin D include:  · [place], tuna, and mackerel. These are some of the best foods to eat when you need to get more vitamin D.  · Cheese, egg yolks, and beef liver. These foods have vitamin D in small amounts. · Milk, soy drinks, orange juice, yogurt, margarine, and some kinds of cereal have vitamin D added to them. Some people don't make vitamin D as well as others. They may have to take extra care in getting enough vitamin D. Things that reduce how much vitamin D your body makes include:  · Dark skin, such as many  Americans have. · Age, especially if you are older than 72. · Digestive problems, such as Crohn's or celiac disease. · Liver and kidney disease. Some people who do not get enough vitamin D may need supplements. Are there any risks from taking vitamin D?  · Too much vitamin D:  ? Can damage your kidneys. ? Can cause nausea and vomiting, constipation, and weakness. ? Raises the amount of calcium in your blood. If this happens, you can get confused or have an irregular heart rhythm. · Vitamin D may interact with other medicines. Tell your doctor about all of the medicines you take, including over-the-counter drugs, herbs, and pills. Tell your doctor about all of your current medical problems. Where can you learn more?   Go to http://www.gray.com/  Enter K240 in the search box to learn more about \"Learning About Vitamin D.\"  Current as of: December 17, 2020               Content Version: 12.8  © 2006-2021 [employer]. Care instructions adapted under license by [employer] (which disclaims liability or warranty for this information). If you have questions about a medical condition or this instruction, always ask your healthcare professional. [place] [address] any warranty or liability for your use of this information. Follow-up and Dispositions    · Return in about 2 weeks (around 7/5/2021), or if symptoms worsen or fail to improve, for Discuss Test Results, F/U Depression & Hyperlipidemia (PAP SMEAR). I have reviewed with the patient details of the assessment and plan and all questions were answered. Relevent patient education was performed. The most recent lab findings were reviewed with the patient. An After Visit Summary was printed and given to the patient.

## 2021-10-06 DIAGNOSIS — E78.2 MIXED HYPERLIPIDEMIA: ICD-10-CM

## 2021-10-06 DIAGNOSIS — E55.9 VITAMIN D DEFICIENCY: ICD-10-CM

## 2021-10-06 DIAGNOSIS — Z76.0 ENCOUNTER FOR MEDICATION REFILL: ICD-10-CM

## 2021-10-06 DIAGNOSIS — I10 ESSENTIAL HYPERTENSION: ICD-10-CM

## 2021-10-06 RX ORDER — ERGOCALCIFEROL 1.25 MG/1
50000 CAPSULE ORAL
Qty: 12 CAPSULE | Refills: 0 | Status: SHIPPED | OUTPATIENT
Start: 2021-10-06 | End: 2022-02-09 | Stop reason: SDUPTHER

## 2021-10-06 RX ORDER — LISINOPRIL AND HYDROCHLOROTHIAZIDE 10; 12.5 MG/1; MG/1
1 TABLET ORAL DAILY
Qty: 90 TABLET | Refills: 0 | Status: SHIPPED | OUTPATIENT
Start: 2021-10-06 | End: 2022-01-24 | Stop reason: SDUPTHER

## 2021-10-06 NOTE — TELEPHONE ENCOUNTER
Pt is requesting refills to be sent to the [facility] in [place]. Last visit 06/21/2021 JENNY Milan   Next appointment 10/21/2021 JENNY Milan   Previous refill encounter(s)   06/14/2021 Prinzide #30 with 2 refills,  06/21/2021 Vitamin D2 #4 with 2 refills     Requested Prescriptions     Pending Prescriptions Disp Refills    lisinopril-hydroCHLOROthiazide (PRINZIDE, ZESTORETIC) 10-12.5 mg per tablet 90 Tablet 0     Sig: Take 1 Tablet by mouth daily.  ergocalciferol (ERGOCALCIFEROL) 1,250 mcg (50,000 unit) capsule 12 Capsule 0     Sig: Take 1 Capsule by mouth every seven (7) days.

## 2021-11-05 ENCOUNTER — OFFICE VISIT (OUTPATIENT)
Dept: INTERNAL MEDICINE CLINIC | Age: 61
End: 2021-11-05
Payer: COMMERCIAL

## 2021-11-05 DIAGNOSIS — R82.90 ABNORMAL FINDING IN URINE: ICD-10-CM

## 2021-11-05 DIAGNOSIS — Z00.00 ENCOUNTER FOR ROUTINE ADULT MEDICAL EXAMINATION: Primary | ICD-10-CM

## 2021-11-05 DIAGNOSIS — F43.21 GRIEF AT LOSS OF CHILD: ICD-10-CM

## 2021-11-05 DIAGNOSIS — F32.A DEPRESSION, UNSPECIFIED DEPRESSION TYPE: ICD-10-CM

## 2021-11-05 DIAGNOSIS — R73.01 IMPAIRED FASTING GLUCOSE: ICD-10-CM

## 2021-11-05 DIAGNOSIS — E78.2 MIXED HYPERLIPIDEMIA: ICD-10-CM

## 2021-11-05 DIAGNOSIS — E55.9 VITAMIN D DEFICIENCY: ICD-10-CM

## 2021-11-05 DIAGNOSIS — Z63.4 GRIEF AT LOSS OF CHILD: ICD-10-CM

## 2021-11-05 DIAGNOSIS — I10 ESSENTIAL HYPERTENSION: ICD-10-CM

## 2021-11-05 LAB
ALBUMIN SERPL-MCNC: 4 G/DL (ref 3.5–5)
ALBUMIN/GLOB SERPL: 1.1 {RATIO} (ref 1.1–2.2)
ALP SERPL-CCNC: 82 U/L (ref 45–117)
ALT SERPL-CCNC: 20 U/L (ref 12–78)
ANION GAP SERPL CALC-SCNC: 4 MMOL/L (ref 5–15)
AST SERPL-CCNC: 10 U/L (ref 15–37)
BILIRUB SERPL-MCNC: 0.5 MG/DL (ref 0.2–1)
BILIRUB UR QL STRIP: NEGATIVE
BUN SERPL-MCNC: 13 MG/DL (ref 6–20)
BUN/CREAT SERPL: 16 (ref 12–20)
CALCIUM SERPL-MCNC: 9.7 MG/DL (ref 8.5–10.1)
CHLORIDE SERPL-SCNC: 107 MMOL/L (ref 97–108)
CHOLEST SERPL-MCNC: 293 MG/DL
CO2 SERPL-SCNC: 27 MMOL/L (ref 21–32)
COMMENT, HOLDF: NORMAL
CREAT SERPL-MCNC: 0.83 MG/DL (ref 0.55–1.02)
ERYTHROCYTE [DISTWIDTH] IN BLOOD BY AUTOMATED COUNT: 13 % (ref 11.5–14.5)
EST. AVERAGE GLUCOSE BLD GHB EST-MCNC: 111 MG/DL
GLOBULIN SER CALC-MCNC: 3.7 G/DL (ref 2–4)
GLUCOSE POC: 89 MG/DL
GLUCOSE SERPL-MCNC: 98 MG/DL (ref 65–100)
GLUCOSE UR-MCNC: NEGATIVE MG/DL
HBA1C MFR BLD: 5.5 % (ref 4–5.6)
HCT VFR BLD AUTO: 39.8 % (ref 35–47)
HDLC SERPL-MCNC: 65 MG/DL
HGB BLD-MCNC: 13.1 G/DL (ref 11.5–16)
KETONES P FAST UR STRIP-MCNC: NEGATIVE MG/DL
LDL CHOLESTEROL POC: 207 MG/DL
MCH RBC QN AUTO: 31.5 PG (ref 26–34)
MCHC RBC AUTO-ENTMCNC: 32.9 G/DL (ref 30–36.5)
MCV RBC AUTO: 95.7 FL (ref 80–99)
NON-HDL GOAL (POC): 227
NRBC # BLD: 0 K/UL (ref 0–0.01)
NRBC BLD-RTO: 0 PER 100 WBC
PH UR STRIP: 5.5 [PH] (ref 4.6–8)
PLATELET # BLD AUTO: 321 K/UL (ref 150–400)
PMV BLD AUTO: 9.4 FL (ref 8.9–12.9)
POTASSIUM SERPL-SCNC: 3.6 MMOL/L (ref 3.5–5.1)
PROT SERPL-MCNC: 7.7 G/DL (ref 6.4–8.2)
PROT UR QL STRIP: NEGATIVE
RBC # BLD AUTO: 4.16 M/UL (ref 3.8–5.2)
SAMPLES BEING HELD,HOLD: NORMAL
SODIUM SERPL-SCNC: 138 MMOL/L (ref 136–145)
SP GR UR STRIP: 1.01 (ref 1–1.03)
TCHOL/HDL RATIO (POC): 4.5
TRIGL SERPL-MCNC: 102 MG/DL
UA UROBILINOGEN AMB POC: NORMAL (ref 0.2–1)
URINALYSIS CLARITY POC: CLEAR
URINALYSIS COLOR POC: YELLOW
URINE BLOOD POC: NORMAL
URINE LEUKOCYTES POC: NORMAL
URINE NITRITES POC: NEGATIVE
WBC # BLD AUTO: 6.9 K/UL (ref 3.6–11)

## 2021-11-05 PROCEDURE — 99214 OFFICE O/P EST MOD 30 MIN: CPT | Performed by: NURSE PRACTITIONER

## 2021-11-05 PROCEDURE — 82962 GLUCOSE BLOOD TEST: CPT | Performed by: NURSE PRACTITIONER

## 2021-11-05 PROCEDURE — 81003 URINALYSIS AUTO W/O SCOPE: CPT | Performed by: NURSE PRACTITIONER

## 2021-11-05 PROCEDURE — 80061 LIPID PANEL: CPT | Performed by: NURSE PRACTITIONER

## 2021-11-05 RX ORDER — SERTRALINE HYDROCHLORIDE 25 MG/1
25 TABLET, FILM COATED ORAL DAILY
Qty: 30 TABLET | Refills: 2 | Status: SHIPPED | OUTPATIENT
Start: 2021-11-05 | End: 2022-01-25 | Stop reason: SDUPTHER

## 2021-11-05 RX ORDER — ATORVASTATIN CALCIUM 20 MG/1
20 TABLET, FILM COATED ORAL DAILY
Qty: 30 TABLET | Refills: 0 | Status: SHIPPED | OUTPATIENT
Start: 2021-11-05 | End: 2021-11-30 | Stop reason: SDUPTHER

## 2021-11-05 SDOH — SOCIAL STABILITY - SOCIAL INSECURITY: DISSAPEARANCE AND DEATH OF FAMILY MEMBER: Z63.4

## 2021-11-05 NOTE — PATIENT INSTRUCTIONS
High Cholesterol: Care Instructions  Overview     Cholesterol is a type of fat in your blood. It is needed for many body functions, such as making new cells. Cholesterol is made by your body. It also comes from food you eat. High cholesterol means that you have too much of the fat in your blood. This raises your risk of a heart attack and stroke. LDL and HDL are part of your total cholesterol. LDL is the \"bad\" cholesterol. High LDL can raise your risk for coronary artery disease, heart attack, and stroke. HDL is the \"good\" cholesterol. It helps clear bad cholesterol from the body. High HDL is linked with a lower risk of coronary artery disease, heart attack, and stroke. Your cholesterol levels help your doctor find out your risk for having a heart attack or stroke. You and your doctor can talk about whether you need to lower your risk and what treatment is best for you. Treatment options include a heart-healthy lifestyle and medicine. Both options can help lower your cholesterol and your risk. The way you choose to lower your risk will depend on how high your risk is for heart attack and stroke. It will also depend on how you feel about taking medicines. Follow-up care is a key part of your treatment and safety. Be sure to make and go to all appointments, and call your doctor if you are having problems. It's also a good idea to know your test results and keep a list of the medicines you take. How can you care for yourself at home? · Eat heart-healthy foods. ? Eat fruits, vegetables, whole grains, beans, and other high-fiber foods. ? Eat lean proteins, such as seafood, lean meats, beans, nuts, and soy products. ? Eat healthy fats, such as canola and olive oil. ? Choose foods that are low in saturated fat. ? Limit sodium and alcohol. ? Limit drinks and foods with added sugar. · Be physically active. Try to do moderate activity at least 2½ hours a week.  Or try to do vigorous activity at least 1¼ hours a week. You may want to walk or try other activities, such as running, swimming, cycling, or playing tennis or team sports. · Stay at a healthy weight or lose weight by making the changes in eating and physical activity listed above. Losing just a small amount of weight, even 5 to 10 pounds, can help reduce your risk for having a heart attack or stroke. · Do not smoke. · Manage other health problems. These include diabetes and high blood pressure. If you think you may have a problem with alcohol or drug use, talk to your doctor. · If you take medicine, take it exactly as prescribed. Call your doctor if you think you are having a problem with your medicine. · Check with your doctor or pharmacist before you use any other medicines, including over-the-counter medicines. Make sure your doctor knows all of the medicines, vitamins, herbal products, and supplements you take. Taking some medicines together can cause problems. When should you call for help? Watch closely for changes in your health, and be sure to contact your doctor if:    · You need help making lifestyle changes.     · You have questions about your medicine. Where can you learn more? Go to http://www.gray.com/  Enter T999 in the search box to learn more about \"High Cholesterol: Care Instructions. \"  Current as of: April 29, 2021               Content Version: 13.0  © 2006-2021 [employer]. Care instructions adapted under license by [employer] (which disclaims liability or warranty for this information). If you have questions about a medical condition or this instruction, always ask your healthcare professional. [place] [address] any warranty or liability for your use of this information. Learning About Vitamin D  Why is it important to get enough vitamin D? Your body needs vitamin D to absorb calcium.  Calcium keeps your bones and muscles, including your heart, healthy and strong. If your muscles don't get enough calcium, they can cramp, hurt, or feel weak. You may have long-term (chronic) muscle aches and pains. If you don't get enough vitamin D throughout life, you have an increased chance of having thin and brittle bones (osteoporosis) in your later years. Children who don't get enough vitamin D may not grow as much as others their age. They also have a chance of getting a rare disease called rickets. It causes weak bones. Vitamin D and calcium are added to many foods. And your body uses sunshine to make its own vitamin D. How much vitamin D do you need? The recommended daily allowance (RDA) for vitamin D is 600 IU (international units) every day for people ages 3 through 79. Adults 71 and older need 800 IU every day. Blood tests for vitamin D can check your vitamin D level. But there is no standard normal range used by all laboratories. You're likely getting enough vitamin D if your levels are in the range of 20 to 50 ng/mL. How can you get more vitamin D? Foods that contain vitamin D include:  · [place], tuna, and mackerel. These are some of the best foods to eat when you need to get more vitamin D.  · Cheese, egg yolks, and beef liver. These foods have vitamin D in small amounts. · Milk, soy drinks, orange juice, yogurt, margarine, and some kinds of cereal have vitamin D added to them. Some people don't make vitamin D as well as others. They may have to take extra care in getting enough vitamin D. Things that reduce how much vitamin D your body makes include:  · Dark skin, such as many  Americans have. · Age, especially if you are older than 72. · Digestive problems, such as Crohn's or celiac disease. · Liver and kidney disease. Some people who do not get enough vitamin D may need supplements. Are there any risks from taking vitamin D?  · Too much vitamin D:  ? Can damage your kidneys.   ? Can cause nausea and vomiting, constipation, and weakness. ? Raises the amount of calcium in your blood. If this happens, you can get confused or have an irregular heart rhythm. · Vitamin D may interact with other medicines. Tell your doctor about all of the medicines you take, including over-the-counter drugs, herbs, and pills. Tell your doctor about all of your current medical problems. Where can you learn more? Go to http://www.george.com/  Enter E630 in the search box to learn more about \"Learning About Vitamin D.\"  Current as of: December 17, 2020               Content Version: 13.0  © 3039-6899 [employer]. Care instructions adapted under license by [employer] (which disclaims liability or warranty for this information). If you have questions about a medical condition or this instruction, always ask your healthcare professional. [address] any warranty or liability for your use of this information.

## 2021-11-05 NOTE — LETTER
NOTIFICATION RETURN TO WORK / SCHOOL 
 
11/5/2021 12:20 PM 
 
Ms. Eliana Bahena [address] [place] [phone] To Whom It May Concern: 
 
Eliana Bahena is currently under the care of [address] [place] [address]. She will return to work/school on: 11-. If there are questions or concerns please have the patient contact our office.  
 
 
 
Sincerely, 
 
 
Gladys Osborne NP

## 2021-11-05 NOTE — PROGRESS NOTES
Chief Complaint   Patient presents with    Hypertension     1. Have you been to the ER, urgent care clinic since your last visit? Hospitalized since your last visit? No    2. Have you seen or consulted any other health care providers outside of the [address] [place] [address] since your last visit? Include any pap smears or colon screening.  No                                                                                                                          57

## 2021-11-07 VITALS
SYSTOLIC BLOOD PRESSURE: 139 MMHG | TEMPERATURE: 97.1 F | OXYGEN SATURATION: 98 % | HEIGHT: 67 IN | BODY MASS INDEX: 32.65 KG/M2 | RESPIRATION RATE: 16 BRPM | WEIGHT: 208 LBS | HEART RATE: 85 BPM | DIASTOLIC BLOOD PRESSURE: 80 MMHG

## 2021-11-08 NOTE — PROGRESS NOTES
Subjective:  Mert Wallace is a 62 y.o. female and presents with Hyperlipidemia, Hypertension, Vitamin D Deficiency and Depression for a Routine Medical Exam.   Per patient she lost her son and she has been having difficulty coping with the loss. She says that her depression has been overwhelming and is requesting an intervention to help her manage her symptoms. Per the patient she is planning to take a leave of absence from her job so that she can make a temporary move to [place] where her daughter resides. She says that she feels as though she needs a break from work and her daughter feels will be best if she stays with her while she recovers. She is requesting to have paperwork completed for leave of absence which would allow her to go to [place] for several weeks to be with her daughter during this difficult time. Patient was educated on the need to discuss her employment decision with her employer to determine the best option for taking a leave of absence and to request JOHN paperwork. Hyperlipidemia Review:  Patient presents for follow up and evaluation of lipids. Compliance with treatment thus far has not been excellent, a repeat fasting lipid profile was done at today's office visit. Patient is not adhering to dietary recommendations. Patient states that she does not exercise regularly, was advised of exercise recommendations of 150 minutes a week. The patient does not use medications that may worsen dyslipidemias (corticosteroids, progestins, anabolic steroids, amiodarone, cyclosporine, olanzapine).       Hypertension Review:  The patient has essential hypertension which is slightly elevated (139/80) at today's visit, patient is very upset and anxious. Diet and Lifestyle: generally follows a  low sodium diet, exercises sporadically  Home BP Monitoring: is not measured at home.   Pertinent ROS: taking medications as instructed, no medication side effects noted, no TIA's, no chest pain on exertion, no dyspnea on exertion, no swelling of ankles.      VitaminD Deficiency Review  Patient presents with Vitamin D deficiency. Patient denies symptoms of bone pain, muscle weakness, Fatigue or other neurological symptoms. Labs were ordered at today's office visit to assess the need for continued  Vitamin D replacement     Depression Review  Patient is being seen for new onset of depression. Per the patient her 22year old son had been causing her to become depressed because she is worried that \"something bad\" would happen to him. She says that her worries were not unfounded as her son \"is gone\". Ongoing symptoms include depressed mood, insomnia, psychomotor agitation, psychomotor retardation, fatigue, feelings of worthlessness/guilt, difficulty concentrating and hopelessness. The patient denies recurrent thoughts of death and suicidal thoughts without plan. Patient was offered the opportunity to talk with [facility] therapist at her last visit but declined, she is now willing to talk to a therapist to facilitate better management of her symptoms. Review of Systems  Constitutional: negative for fevers, chills, anorexia and weight loss  Eyes:   negative for visual disturbance and irritation  ENT:   negative for tinnitus,sore throat,nasal congestion,ear pains. hoarseness  Respiratory:  negative for cough, hemoptysis, dyspnea,wheezing  CV:   negative for chest pain, palpitations, lower extremity edema  GI:   negative for nausea, vomiting, diarrhea, abdominal pain,melena  Endo:               negative for polyuria,polydipsia,polyphagia,heat intolerance  Genitourinary: negative for frequency, dysuria and hematuria  Integument:  negative for rash and pruritus  Hematologic:  negative for easy bruising and gum/nose bleeding  Musculoskel: negative for myalgias, arthralgias, back pain, muscle weakness, joint pain  Neurological:  negative for headaches, dizziness, vertigo, memory problems and gait   Behavl/Psych: positive for feelings of anxiety, depression, mood changes    Past Medical History:   Diagnosis Date    Blindness of left eye     Hypertension      Past Surgical History:   Procedure Laterality Date    HX GYN       Social History     Socioeconomic History    Marital status: UNKNOWN   Tobacco Use    Smoking status: Former Smoker     Packs/day: 3.00    Smokeless tobacco: Never Used    Tobacco comment: smoker for 17 years; 3 cigs a day   Substance and Sexual Activity    Alcohol use: Yes     Alcohol/week: 1.0 standard drink     Types: 1 Glasses of wine per week     Comment: social    Drug use: No    Sexual activity: Never     Family History   Problem Relation Age of Onset    Hypertension Mother     Hypertension Father     Hypertension Sister     Hypertension Brother      Current Outpatient Medications   Medication Sig Dispense Refill    atorvastatin (LIPITOR) 20 mg tablet Take 1 Tablet by mouth daily. Indications: excessive fat in the blood 30 Tablet 0    sertraline (ZOLOFT) 25 mg tablet Take 1 Tablet by mouth daily. 30 Tablet 2    lisinopril-hydroCHLOROthiazide (PRINZIDE, ZESTORETIC) 10-12.5 mg per tablet Take 1 Tablet by mouth daily. 90 Tablet 0    ergocalciferol (ERGOCALCIFEROL) 1,250 mcg (50,000 unit) capsule Take 1 Capsule by mouth every seven (7) days.  12 Capsule 0     No Known Allergies    Objective:  Visit Vitals  /80 (BP 1 Location: Left upper arm, BP Patient Position: Semi fowlers, BP Cuff Size: Adult)   Pulse 85   Temp 97.1 °F (36.2 °C) (Oral)   Resp 16   Ht 5' 7\" (1.702 m)   Wt 208 lb (94.3 kg)   SpO2 98%   BMI 32.58 kg/m²     Physical Exam:   General appearance - alert, well appearing,  in mild distress, tearful and anxious  Mental status - alert, oriented to person, place, and time  EYE-PRASHANTH, EOMI, corneas normal, no foreign bodies  ENT-ENT exam normal, no neck nodes or sinus tenderness  Nose - normal and patent, no erythema, discharge or polyps  Mouth - mucous membranes moist, pharynx normal without lesions  Neck - supple, no significant adenopathy   Chest - clear to auscultation, no wheezes, rales or rhonchi, symmetric air entry   Heart - normal rate, regular rhythm, normal S1, S2, no murmurs, rubs, clicks or gallops   Abdomen - soft, nontender, nondistended, no masses or organomegaly  Lymph- no adenopathy palpable  Ext-peripheral pulses normal, no pedal edema, no clubbing or cyanosis  Skin-Warm and dry. no hyperpigmentation, vitiligo, or suspicious lesions  Neuro -alert, oriented, normal speech, no focal findings or movement disorder noted  Musculoskeletal-normal C-spine, no tenderness, full ROM without pain  Feet-no nail deformities or callus formation with good pulses noted      Results for orders placed or performed in visit on 11/05/21   HEMOGLOBIN A1C WITH EAG   Result Value Ref Range    Hemoglobin A1c 5.5 4.0 - 5.6 %    Est. average glucose 299 mg/dL   METABOLIC PANEL, COMPREHENSIVE   Result Value Ref Range    Sodium 138 136 - 145 mmol/L    Potassium 3.6 3.5 - 5.1 mmol/L    Chloride 107 97 - 108 mmol/L    CO2 27 21 - 32 mmol/L    Anion gap 4 (L) 5 - 15 mmol/L    Glucose 98 65 - 100 mg/dL    BUN 13 6 - 20 MG/DL    Creatinine 0.83 0.55 - 1.02 MG/DL    BUN/Creatinine ratio 16 12 - 20      GFR est AA >60 >60 ml/min/1.73m2    GFR est non-AA >60 >60 ml/min/1.73m2    Calcium 9.7 8.5 - 10.1 MG/DL    Bilirubin, total 0.5 0.2 - 1.0 MG/DL    ALT (SGPT) 20 12 - 78 U/L    AST (SGOT) 10 (L) 15 - 37 U/L    Alk.  phosphatase 82 45 - 117 U/L    Protein, total 7.7 6.4 - 8.2 g/dL    Albumin 4.0 3.5 - 5.0 g/dL    Globulin 3.7 2.0 - 4.0 g/dL    A-G Ratio 1.1 1.1 - 2.2     CBC W/O DIFF   Result Value Ref Range    WBC 6.9 3.6 - 11.0 K/uL    RBC 4.16 3.80 - 5.20 M/uL    HGB 13.1 11.5 - 16.0 g/dL    HCT 39.8 35.0 - 47.0 %    MCV 95.7 80.0 - 99.0 FL    MCH 31.5 26.0 - 34.0 PG    MCHC 32.9 30.0 - 36.5 g/dL    RDW 13.0 11.5 - 14.5 %    PLATELET 864 627 - 665 K/uL    MPV 9.4 8.9 - 12.9 FL    NRBC 0.0 0  WBC ABSOLUTE NRBC 0.00 0.00 - 0.01 K/uL   SAMPLES BEING HELD   Result Value Ref Range    SAMPLES BEING HELD URINE      COMMENT        Add-on orders for these samples will be processed based on acceptable specimen integrity and analyte stability, which may vary by analyte. AMB POC GLUCOSE BLOOD, BY GLUCOSE MONITORING DEVICE   Result Value Ref Range    Glucose POC 89 MG/DL   AMB POC LIPID PROFILE   Result Value Ref Range    Cholesterol (POC) 293     Triglycerides (POC) 102     HDL Cholesterol (POC) 65     LDL Cholesterol (POC) 207 MG/DL    Non-HDL Goal (POC) 227     TChol/HDL Ratio (POC) 4.5    AMB POC URINALYSIS DIP STICK AUTO W/O MICRO   Result Value Ref Range    Color (UA POC) Yellow     Clarity (UA POC) Clear     Glucose (UA POC) Negative Negative    Bilirubin (UA POC) Negative Negative    Ketones (UA POC) Negative Negative    Specific gravity (UA POC) 1.010 1.001 - 1.035    Blood (UA POC) Trace Negative    pH (UA POC) 5.5 4.6 - 8.0    Protein (UA POC) Negative Negative    Urobilinogen (UA POC) 0.2 mg/dL 0.2 - 1    Nitrites (UA POC) Negative Negative    Leukocyte esterase (UA POC) Trace Negative       Assessment/Plan:    ICD-10-CM ICD-9-CM    1. Encounter for routine adult medical examination  Z00.00 V70.0 CBC W/O DIFF      METABOLIC PANEL, COMPREHENSIVE      METABOLIC PANEL, COMPREHENSIVE      CBC W/O DIFF   2. Essential hypertension  I10 401.9    3. Mixed hyperlipidemia  E78.2 272.2 AMB POC LIPID PROFILE      AMB POC URINALYSIS DIP STICK AUTO W/O MICRO      HEMOGLOBIN A1C WITH EAG      atorvastatin (LIPITOR) 20 mg tablet      HEMOGLOBIN A1C WITH EAG   4. Vitamin D deficiency  E55.9 268.9 VITAMIN D, 25 HYDROXY   5. Depression, unspecified depression type  F32. A 311 sertraline (ZOLOFT) 25 mg tablet      REFERRAL TO SOCIAL WORK   6. Impaired fasting glucose  R73.01 790.21 AMB POC GLUCOSE BLOOD, BY GLUCOSE MONITORING DEVICE      CANCELED: AMB POC HEMOGLOBIN A1C   7.  Grief at loss of child  F43.21 309.0 sertraline (ZOLOFT) 25 mg tablet    Z63.4  REFERRAL TO SOCIAL WORK   8. Abnormal finding in urine  R82.90 791.9 CULTURE, URINE     Orders Placed This Encounter    CULTURE, URINE     Standing Status:   Future     Standing Expiration Date:   5/5/2022    CBC W/O DIFF     Standing Status:   Future     Number of Occurrences:   1     Standing Expiration Date:   23/0/0956    METABOLIC PANEL, COMPREHENSIVE     Standing Status:   Future     Number of Occurrences:   1     Standing Expiration Date:   11/5/2022    HEMOGLOBIN A1C WITH EAG     Standing Status:   Future     Number of Occurrences:   1     Standing Expiration Date:   11/5/2022    VITAMIN D, 25 HYDROXY     Standing Status:   Future     Standing Expiration Date:   11/5/2022    SAMPLES BEING HELD     ATLASSITEID:[id number]    REFERRAL TO SOCIAL WORK     Referral Priority:   Routine     Referral Type:   Consultation     Referral Reason:   Specialty Services Required     Referred to Provider:   Ryan Muñoz LCSW     Requested Specialty:   Licensed Clinical      Number of Visits Requested:   1    AMB POC GLUCOSE BLOOD, BY GLUCOSE MONITORING DEVICE    AMB POC LIPID PROFILE    AMB POC URINALYSIS DIP STICK AUTO W/O MICRO    atorvastatin (LIPITOR) 20 mg tablet     Sig: Take 1 Tablet by mouth daily. Indications: excessive fat in the blood     Dispense:  30 Tablet     Refill:  0    sertraline (ZOLOFT) 25 mg tablet     Sig: Take 1 Tablet by mouth daily. Dispense:  30 Tablet     Refill:  2     Patient Instructions          High Cholesterol: Care Instructions  Overview     Cholesterol is a type of fat in your blood. It is needed for many body functions, such as making new cells. Cholesterol is made by your body. It also comes from food you eat. High cholesterol means that you have too much of the fat in your blood. This raises your risk of a heart attack and stroke. LDL and HDL are part of your total cholesterol. LDL is the \"bad\" cholesterol.  High LDL can raise your risk for coronary artery disease, heart attack, and stroke. HDL is the \"good\" cholesterol. It helps clear bad cholesterol from the body. High HDL is linked with a lower risk of coronary artery disease, heart attack, and stroke. Your cholesterol levels help your doctor find out your risk for having a heart attack or stroke. You and your doctor can talk about whether you need to lower your risk and what treatment is best for you. Treatment options include a heart-healthy lifestyle and medicine. Both options can help lower your cholesterol and your risk. The way you choose to lower your risk will depend on how high your risk is for heart attack and stroke. It will also depend on how you feel about taking medicines. Follow-up care is a key part of your treatment and safety. Be sure to make and go to all appointments, and call your doctor if you are having problems. It's also a good idea to know your test results and keep a list of the medicines you take. How can you care for yourself at home? · Eat heart-healthy foods. ? Eat fruits, vegetables, whole grains, beans, and other high-fiber foods. ? Eat lean proteins, such as seafood, lean meats, beans, nuts, and soy products. ? Eat healthy fats, such as canola and olive oil. ? Choose foods that are low in saturated fat. ? Limit sodium and alcohol. ? Limit drinks and foods with added sugar. · Be physically active. Try to do moderate activity at least 2½ hours a week. Or try to do vigorous activity at least 1¼ hours a week. You may want to walk or try other activities, such as running, swimming, cycling, or playing tennis or team sports. · Stay at a healthy weight or lose weight by making the changes in eating and physical activity listed above. Losing just a small amount of weight, even 5 to 10 pounds, can help reduce your risk for having a heart attack or stroke. · Do not smoke. · Manage other health problems.  These include diabetes and high blood pressure. If you think you may have a problem with alcohol or drug use, talk to your doctor. · If you take medicine, take it exactly as prescribed. Call your doctor if you think you are having a problem with your medicine. · Check with your doctor or pharmacist before you use any other medicines, including over-the-counter medicines. Make sure your doctor knows all of the medicines, vitamins, herbal products, and supplements you take. Taking some medicines together can cause problems. When should you call for help? Watch closely for changes in your health, and be sure to contact your doctor if:    · You need help making lifestyle changes.     · You have questions about your medicine. Where can you learn more? Go to http://www.gray.com/  Enter H147 in the search box to learn more about \"High Cholesterol: Care Instructions. \"  Current as of: April 29, 2021               Content Version: 13.0  © 9450-6628 [employer]. Care instructions adapted under license by [employer] (which disclaims liability or warranty for this information). If you have questions about a medical condition or this instruction, always ask your healthcare professional. [address] any warranty or liability for your use of this information. Learning About Vitamin D  Why is it important to get enough vitamin D? Your body needs vitamin D to absorb calcium. Calcium keeps your bones and muscles, including your heart, healthy and strong. If your muscles don't get enough calcium, they can cramp, hurt, or feel weak. You may have long-term (chronic) muscle aches and pains. If you don't get enough vitamin D throughout life, you have an increased chance of having thin and brittle bones (osteoporosis) in your later years. Children who don't get enough vitamin D may not grow as much as others their age. They also have a chance of getting a rare disease called rickets. It causes weak bones. Vitamin D and calcium are added to many foods. And your body uses sunshine to make its own vitamin D. How much vitamin D do you need? The recommended daily allowance (RDA) for vitamin D is 600 IU (international units) every day for people ages 3 through 79. Adults 71 and older need 800 IU every day. Blood tests for vitamin D can check your vitamin D level. But there is no standard normal range used by all laboratories. You're likely getting enough vitamin D if your levels are in the range of 20 to 50 ng/mL. How can you get more vitamin D? Foods that contain vitamin D include:  · [place], tuna, and mackerel. These are some of the best foods to eat when you need to get more vitamin D.  · Cheese, egg yolks, and beef liver. These foods have vitamin D in small amounts. · Milk, soy drinks, orange juice, yogurt, margarine, and some kinds of cereal have vitamin D added to them. Some people don't make vitamin D as well as others. They may have to take extra care in getting enough vitamin D. Things that reduce how much vitamin D your body makes include:  · Dark skin, such as many  Americans have. · Age, especially if you are older than 72. · Digestive problems, such as Crohn's or celiac disease. · Liver and kidney disease. Some people who do not get enough vitamin D may need supplements. Are there any risks from taking vitamin D?  · Too much vitamin D:  ? Can damage your kidneys. ? Can cause nausea and vomiting, constipation, and weakness. ? Raises the amount of calcium in your blood. If this happens, you can get confused or have an irregular heart rhythm. · Vitamin D may interact with other medicines. Tell your doctor about all of the medicines you take, including over-the-counter drugs, herbs, and pills. Tell your doctor about all of your current medical problems. Where can you learn more?   Go to http://www.gray.com/  Enter K445 in the search box to learn more about \"Learning About Vitamin D.\"  Current as of: December 17, 2020               Content Version: 13.0  © 6728-9488 [employer], [employer]. Care instructions adapted under license by [employer] (which disclaims liability or warranty for this information). If you have questions about a medical condition or this instruction, always ask your healthcare professional. [place] [address] any warranty or liability for your use of this information. Follow-up and Dispositions    · Return in about 2 weeks (around 11/19/2021), or if symptoms worsen or fail to improve, for Discuss lab results and address care gaps. Follow-up of depression (Zoloft and therapist). I have reviewed with the patient details of the assessment and plan and all questions were answered. Relevent patient education was performed. The most recent lab findings were reviewed with the patient. An After Visit Summary was printed and given to the patient.

## 2021-11-30 DIAGNOSIS — E78.2 MIXED HYPERLIPIDEMIA: ICD-10-CM

## 2021-11-30 NOTE — TELEPHONE ENCOUNTER
Last visit 11/05/2021 JENNY Milan   Next appointment 02/07/2022 JENNY Milan   Previous refill encounter(s)   11/05/2021 Lipitor #30 with 2 refills     Requested Prescriptions     Pending Prescriptions Disp Refills    atorvastatin (LIPITOR) 20 mg tablet 30 Tablet 2     Sig: Take 1 Tablet by mouth daily.  Indications: excessive fat in the blood

## 2021-12-02 RX ORDER — ATORVASTATIN CALCIUM 20 MG/1
20 TABLET, FILM COATED ORAL DAILY
Qty: 30 TABLET | Refills: 2 | Status: SHIPPED | OUTPATIENT
Start: 2021-12-02 | End: 2022-02-25 | Stop reason: SDUPTHER

## 2022-01-24 ENCOUNTER — TELEPHONE (OUTPATIENT)
Dept: INTERNAL MEDICINE CLINIC | Age: 62
End: 2022-01-24

## 2022-01-24 DIAGNOSIS — I10 ESSENTIAL HYPERTENSION: ICD-10-CM

## 2022-01-24 DIAGNOSIS — Z76.0 ENCOUNTER FOR MEDICATION REFILL: ICD-10-CM

## 2022-01-24 RX ORDER — LISINOPRIL AND HYDROCHLOROTHIAZIDE 10; 12.5 MG/1; MG/1
1 TABLET ORAL DAILY
Qty: 30 TABLET | Refills: 0 | Status: SHIPPED | OUTPATIENT
Start: 2022-01-24 | End: 2022-02-09 | Stop reason: SDUPTHER

## 2022-01-25 DIAGNOSIS — Z63.4 GRIEF AT LOSS OF CHILD: ICD-10-CM

## 2022-01-25 DIAGNOSIS — F32.A DEPRESSION, UNSPECIFIED DEPRESSION TYPE: ICD-10-CM

## 2022-01-25 DIAGNOSIS — F43.21 GRIEF AT LOSS OF CHILD: ICD-10-CM

## 2022-01-25 RX ORDER — SERTRALINE HYDROCHLORIDE 25 MG/1
25 TABLET, FILM COATED ORAL DAILY
Qty: 30 TABLET | Refills: 0 | Status: SHIPPED | OUTPATIENT
Start: 2022-01-25 | End: 2022-02-22 | Stop reason: SDUPTHER

## 2022-01-25 SDOH — SOCIAL STABILITY - SOCIAL INSECURITY: DISSAPEARANCE AND DEATH OF FAMILY MEMBER: Z63.4

## 2022-01-25 NOTE — TELEPHONE ENCOUNTER
Last visit 11/05/2021 JENNY Milan   Next appointment 02/07/2022 JENNY Milan   Previous refill encounter(s)   11/05/2021 Zoloft #30 with 2 refills    Requested Prescriptions     Pending Prescriptions Disp Refills    sertraline (ZOLOFT) 25 mg tablet 30 Tablet 0     Sig: Take 1 Tablet by mouth daily.

## 2022-02-09 ENCOUNTER — VIRTUAL VISIT (OUTPATIENT)
Dept: INTERNAL MEDICINE CLINIC | Age: 62
End: 2022-02-09
Payer: COMMERCIAL

## 2022-02-09 DIAGNOSIS — Z76.0 ENCOUNTER FOR MEDICATION REFILL: ICD-10-CM

## 2022-02-09 DIAGNOSIS — E78.2 MIXED HYPERLIPIDEMIA: ICD-10-CM

## 2022-02-09 DIAGNOSIS — I10 ESSENTIAL HYPERTENSION: ICD-10-CM

## 2022-02-09 DIAGNOSIS — F32.A DEPRESSION, UNSPECIFIED DEPRESSION TYPE: ICD-10-CM

## 2022-02-09 DIAGNOSIS — E55.9 VITAMIN D DEFICIENCY: ICD-10-CM

## 2022-02-09 DIAGNOSIS — Z20.822 EXPOSURE TO CONFIRMED CASE OF COVID-19: ICD-10-CM

## 2022-02-09 DIAGNOSIS — Z00.00 ENCOUNTER FOR ROUTINE ADULT MEDICAL EXAMINATION: Primary | ICD-10-CM

## 2022-02-09 PROCEDURE — 99214 OFFICE O/P EST MOD 30 MIN: CPT | Performed by: NURSE PRACTITIONER

## 2022-02-09 RX ORDER — ERGOCALCIFEROL 1.25 MG/1
50000 CAPSULE ORAL
Qty: 12 CAPSULE | Refills: 0 | Status: SHIPPED | OUTPATIENT
Start: 2022-02-09 | End: 2022-05-04 | Stop reason: SDUPTHER

## 2022-02-09 RX ORDER — LISINOPRIL AND HYDROCHLOROTHIAZIDE 10; 12.5 MG/1; MG/1
1 TABLET ORAL DAILY
Qty: 90 TABLET | Refills: 0 | Status: SHIPPED | OUTPATIENT
Start: 2022-02-09 | End: 2022-05-17 | Stop reason: SDUPTHER

## 2022-02-09 NOTE — PATIENT INSTRUCTIONS
9 Things To Do If You've Been Exposed to COVID-19  If you are fully vaccinated or have recently been sick with COVID-19, you may not need to quarantine. Stay home. If you've been exposed to the virus but don't have symptoms, you may need to stay in quarantine for up to 14 days. In some cases it may be shorter. Ask your doctor when it's safe to end your quarantine. Be sure to follow all instructions from your local health authorities. Don't go to school, work, or public areas. And don't use public transportation, ride-shares, or taxis unless you have no choice. Leave your home only if you need to get medical care. But call the doctor's office first so they know you're coming. Call your doctor. Call your doctor or other health professional to let them know that you've been exposed. They might want you to be tested, or they may have other instructions for you. Wear a mask when you are around other people. It can help stop the spread of the virus. Limit contact with people in your home. If possible, stay in a separate bedroom and use a separate bathroom. Avoid contact with pets and other animals. Cover your mouth and nose with a tissue when you cough or sneeze. Then throw it in the trash right away. Wash your hands often, especially after you cough or sneeze. Use soap and water, and scrub for at least 20 seconds. If soap and water aren't available, use an alcohol-based hand . Don't share personal household items. These include bedding, towels, cups and glasses, and eating utensils. Clean and disinfect your home every day. Use household  or disinfectant wipes or sprays. Current as of: March 26, 2021               Content Version: 13.0  © 2006-2021 [employer]. Care instructions adapted under license by [employer] (which disclaims liability or warranty for this information).  If you have questions about a medical condition or this instruction, always ask your healthcare professional. [address] any warranty or liability for your use of this information. High Blood Pressure: Care Instructions  Overview     It's normal for blood pressure to go up and down throughout the day. But if it stays up, you have high blood pressure. Another name for high blood pressure is hypertension. Despite what a lot of people think, high blood pressure usually doesn't cause headaches or make you feel dizzy or lightheaded. It usually has no symptoms. But it does increase your risk of stroke, heart attack, and other problems. You and your doctor will talk about your risks of these problems based on your blood pressure. Your doctor will give you a goal for your blood pressure. Your goal will be based on your health and your age. Lifestyle changes, such as eating healthy and being active, are always important to help lower blood pressure. You might also take medicine to reach your blood pressure goal.  Follow-up care is a key part of your treatment and safety. Be sure to make and go to all appointments, and call your doctor if you are having problems. It's also a good idea to know your test results and keep a list of the medicines you take. How can you care for yourself at home? Medical treatment  · If you stop taking your medicine, your blood pressure will go back up. You may take one or more types of medicine to lower your blood pressure. Be safe with medicines. Take your medicine exactly as prescribed. Call your doctor if you think you are having a problem with your medicine. · Talk to your doctor before you start taking aspirin every day. Aspirin can help certain people lower their risk of a heart attack or stroke. But taking aspirin isn't right for everyone, because it can cause serious bleeding. · See your doctor regularly. You may need to see the doctor more often at first or until your blood pressure comes down.   · If you are taking blood pressure medicine, talk to your doctor before you take decongestants or anti-inflammatory medicine, such as ibuprofen. Some of these medicines can raise blood pressure. · Learn how to check your blood pressure at home. Lifestyle changes  · Stay at a healthy weight. This is especially important if you put on weight around the waist. Losing even 10 pounds can help you lower your blood pressure. · If your doctor recommends it, get more exercise. Walking is a good choice. Bit by bit, increase the amount you walk every day. Try for at least 30 minutes on most days of the week. You also may want to swim, bike, or do other activities. · Avoid or limit alcohol. Talk to your doctor about whether you can drink any alcohol. · Try to limit how much sodium you eat to less than 2,300 milligrams (mg) a day. Your doctor may ask you to try to eat less than 1,500 mg a day. · Eat plenty of fruits (such as bananas and oranges), vegetables, legumes, whole grains, and low-fat dairy products. · Lower the amount of saturated fat in your diet. Saturated fat is found in animal products such as milk, cheese, and meat. Limiting these foods may help you lose weight and also lower your risk for heart disease. · Do not smoke. Smoking increases your risk for heart attack and stroke. If you need help quitting, talk to your doctor about stop-smoking programs and medicines. These can increase your chances of quitting for good. When should you call for help? Call 911  anytime you think you may need emergency care. This may mean having symptoms that suggest that your blood pressure is causing a serious heart or blood vessel problem. Your blood pressure may be over 180/120. For example, call 911 if:    · You have symptoms of a heart attack. These may include:  ? Chest pain or pressure, or a strange feeling in the chest.  ? Sweating. ? Shortness of breath. ? Nausea or vomiting.   ? Pain, pressure, or a strange feeling in the back, neck, jaw, or upper belly or in one or both shoulders or arms. ? Lightheadedness or sudden weakness. ? A fast or irregular heartbeat.     · You have symptoms of a stroke. These may include:  ? Sudden numbness, tingling, weakness, or loss of movement in your face, arm, or leg, especially on only one side of your body. ? Sudden vision changes. ? Sudden trouble speaking. ? Sudden confusion or trouble understanding simple statements. ? Sudden problems with walking or balance. ? A sudden, severe headache that is different from past headaches.     · You have severe back or belly pain. Do not wait until your blood pressure comes down on its own. Get help right away. Call your doctor now or seek immediate care if:    · Your blood pressure is much higher than normal (such as 180/120 or higher), but you don't have symptoms.     · You think high blood pressure is causing symptoms, such as:  ? Severe headache.  ? Blurry vision. Watch closely for changes in your health, and be sure to contact your doctor if:    · Your blood pressure measures higher than your doctor recommends at least 2 times. That means the top number is higher or the bottom number is higher, or both.     · You think you may be having side effects from your blood pressure medicine. Where can you learn more? Go to http://www.gray.com/  Enter [id number] in the search box to learn more about \"High Blood Pressure: Care Instructions. \"  Current as of: April 29, 2021               Content Version: 13.0  © 2006-2021 [employer]. Care instructions adapted under license by [employer] (which disclaims liability or warranty for this information). If you have questions about a medical condition or this instruction, always ask your healthcare professional. [place] [address] any warranty or liability for your use of this information.          High Cholesterol: Care Instructions  Overview Cholesterol is a type of fat in your blood. It is needed for many body functions, such as making new cells. Cholesterol is made by your body. It also comes from food you eat. High cholesterol means that you have too much of the fat in your blood. This raises your risk of a heart attack and stroke. LDL and HDL are part of your total cholesterol. LDL is the \"bad\" cholesterol. High LDL can raise your risk for coronary artery disease, heart attack, and stroke. HDL is the \"good\" cholesterol. It helps clear bad cholesterol from the body. High HDL is linked with a lower risk of coronary artery disease, heart attack, and stroke. Your cholesterol levels help your doctor find out your risk for having a heart attack or stroke. You and your doctor can talk about whether you need to lower your risk and what treatment is best for you. Treatment options include a heart-healthy lifestyle and medicine. Both options can help lower your cholesterol and your risk. The way you choose to lower your risk will depend on how high your risk is for heart attack and stroke. It will also depend on how you feel about taking medicines. Follow-up care is a key part of your treatment and safety. Be sure to make and go to all appointments, and call your doctor if you are having problems. It's also a good idea to know your test results and keep a list of the medicines you take. How can you care for yourself at home? · Eat heart-healthy foods. ? Eat fruits, vegetables, whole grains, beans, and other high-fiber foods. ? Eat lean proteins, such as seafood, lean meats, beans, nuts, and soy products. ? Eat healthy fats, such as canola and olive oil. ? Choose foods that are low in saturated fat. ? Limit sodium and alcohol. ? Limit drinks and foods with added sugar. · Be physically active. Try to do moderate activity at least 2½ hours a week. Or try to do vigorous activity at least 1¼ hours a week.  You may want to walk or try other activities, such as running, swimming, cycling, or playing tennis or team sports. · Stay at a healthy weight or lose weight by making the changes in eating and physical activity listed above. Losing just a small amount of weight, even 5 to 10 pounds, can help reduce your risk for having a heart attack or stroke. · Do not smoke. · Manage other health problems. These include diabetes and high blood pressure. If you think you may have a problem with alcohol or drug use, talk to your doctor. · If you take medicine, take it exactly as prescribed. Call your doctor if you think you are having a problem with your medicine. · Check with your doctor or pharmacist before you use any other medicines, including over-the-counter medicines. Make sure your doctor knows all of the medicines, vitamins, herbal products, and supplements you take. Taking some medicines together can cause problems. When should you call for help? Watch closely for changes in your health, and be sure to contact your doctor if:    · You need help making lifestyle changes.     · You have questions about your medicine. Where can you learn more? Go to http://www.gray.com/  Enter L320 in the search box to learn more about \"High Cholesterol: Care Instructions. \"  Current as of: April 29, 2021               Content Version: 13.0  © 2006-2021 [employer]. Care instructions adapted under license by [employer] (which disclaims liability or warranty for this information). If you have questions about a medical condition or this instruction, always ask your healthcare professional. [place] [address] any warranty or liability for your use of this information.

## 2022-02-09 NOTE — PROGRESS NOTES
Chief Complaint   Patient presents with    Cholesterol Problem    Hypertension    Anxiety     1. Have you been to the ER, urgent care clinic since your last visit? Hospitalized since your last visit? No    2. Have you seen or consulted any other health care providers outside of the [address] [place] [address] since your last visit? Include any pap smears or colon screening.  No

## 2022-02-14 NOTE — PROGRESS NOTES
Subjective:  Rj Ornelas is a 62 y.o. female and presents with Hyperlipidemia, Hypertension, Vitamin D Deficiency and Depression for a Routine Medical Exam and Labs. She is unable to come into clinic today because she was exposed to an individual with a confirmed case of Covid-19. Hyperlipidemia Review:  Patient presents for follow up and evaluation of lipids. Compliance with treatment thus far has not been excellent, a repeat fasting lipid profile was done at today's office visit. Patient is not adhering to dietary recommendations. Patient states that she does not exercise regularly, was advised of exercise recommendations of 150 minutes a week. The patient does not use medications that may worsen dyslipidemias (corticosteroids, progestins, anabolic steroids, amiodarone, cyclosporine, olanzapine).        Hypertension Review:  The patient has essential hypertension  Diet and Lifestyle: generally follows a  low sodium diet, exercises sporadically  Home BP Monitoring: is not measured at home. Pertinent ROS: taking medications as instructed, no medication side effects noted, no TIA's, no chest pain on exertion, no dyspnea on exertion, no swelling of ankles.      Vitamin D Deficiency Review  Patient presents with Vitamin D deficiency. Patient denies symptoms of bone pain, muscle weakness, Fatigue or other neurological symptoms. Labs were ordered at today's office visit to assess the need for continued  Vitamin D replacement     Depression Review  Patient is being seen for follow-up of depression. Ongoing symptoms include depressed mood, insomnia, psychomotor agitation, psychomotor retardation, fatigue, feelings of worthlessness/guilt, difficulty concentrating and hopelessness. The patient denies recurrent thoughts of death and suicidal thoughts without plan.      Review of Systems  Constitutional: negative for fevers, chills, anorexia and weight loss  Eyes:               negative for visual disturbance and irritation  ENT:                negative for tinnitus,sore throat,nasal congestion,ear pains. hoarseness  Respiratory:     negative for cough, hemoptysis, dyspnea,wheezing  CV:                  negative for chest pain, palpitations, lower extremity edema  GI:                   negative for nausea, vomiting, diarrhea, abdominal pain,melena  Endo:               negative for polyuria,polydipsia,polyphagia,heat intolerance  Genitourinary: negative for frequency, dysuria and hematuria  Integument:     negative for rash and pruritus  Hematologic:   negative for easy bruising and gum/nose bleeding  Musculoskel:   negative for myalgias, arthralgias, back pain, muscle weakness, joint pain  Neurological:   negative for headaches, dizziness, vertigo, memory problems and gait   Behavl/Psych: positive for feelings of anxiety, depression, mood changes    Past Medical History:   Diagnosis Date    Blindness of left eye     Hypertension      Past Surgical History:   Procedure Laterality Date    HX GYN       Social History     Socioeconomic History    Marital status: UNKNOWN   Tobacco Use    Smoking status: Former Smoker     Packs/day: 3.00    Smokeless tobacco: Never Used    Tobacco comment: smoker for 17 years; 3 cigs a day   Substance and Sexual Activity    Alcohol use: Yes     Alcohol/week: 1.0 standard drink     Types: 1 Glasses of wine per week     Comment: social    Drug use: No    Sexual activity: Never     Family History   Problem Relation Age of Onset    Hypertension Mother     Hypertension Father     Hypertension Sister     Hypertension Brother      Current Outpatient Medications   Medication Sig Dispense Refill    lisinopril-hydroCHLOROthiazide (PRINZIDE, ZESTORETIC) 10-12.5 mg per tablet Take 1 Tablet by mouth daily. 90 Tablet 0    ergocalciferol (ERGOCALCIFEROL) 1,250 mcg (50,000 unit) capsule Take 1 Capsule by mouth every seven (7) days.  12 Capsule 0    sertraline (ZOLOFT) 25 mg tablet Take 1 Tablet by mouth daily. 30 Tablet 0    atorvastatin (LIPITOR) 20 mg tablet Take 1 Tablet by mouth daily. Indications: excessive fat in the blood 30 Tablet 2     No Known Allergies    Objective: There were no vitals taken for this visit. Physical Exam:   Nessa Henson is a 62 y.o. female being evaluated by a Virtual Visit encounter to address concerns as mentioned above. Due to this being a TeleHealth encounter (During [facility] public health emergency), evaluation of the following organ systems was limited: Vitals/Constitutional/EENT/Resp/CV/GI//MS/Neuro/Skin/Heme-Lymph-Imm. Pursuant to the emergency declaration under the [address] [place] [address], [address] [place] [address] authority and the [employer] and [employer] Act, this Virtual Visit was conducted with patient's (and/or legal guardian's) consent, to reduce the risk of exposure to COVID-19 and provide necessary medical care. Services were provided virtually to substitute for in-person encounter. Results for orders placed or performed in visit on 11/05/21   HEMOGLOBIN A1C WITH EAG   Result Value Ref Range    Hemoglobin A1c 5.5 4.0 - 5.6 %    Est. average glucose 248 mg/dL   METABOLIC PANEL, COMPREHENSIVE   Result Value Ref Range    Sodium 138 136 - 145 mmol/L    Potassium 3.6 3.5 - 5.1 mmol/L    Chloride 107 97 - 108 mmol/L    CO2 27 21 - 32 mmol/L    Anion gap 4 (L) 5 - 15 mmol/L    Glucose 98 65 - 100 mg/dL    BUN 13 6 - 20 MG/DL    Creatinine 0.83 0.55 - 1.02 MG/DL    BUN/Creatinine ratio 16 12 - 20      GFR est AA >60 >60 ml/min/1.73m2    GFR est non-AA >60 >60 ml/min/1.73m2    Calcium 9.7 8.5 - 10.1 MG/DL    Bilirubin, total 0.5 0.2 - 1.0 MG/DL    ALT (SGPT) 20 12 - 78 U/L    AST (SGOT) 10 (L) 15 - 37 U/L    Alk.  phosphatase 82 45 - 117 U/L    Protein, total 7.7 6.4 - 8.2 g/dL    Albumin 4.0 3.5 - 5.0 g/dL    Globulin 3.7 2.0 - 4.0 g/dL    A-G Ratio 1.1 1.1 - 2.2     CBC W/O DIFF   Result Value Ref Range WBC 6.9 3.6 - 11.0 K/uL    RBC 4.16 3.80 - 5.20 M/uL    HGB 13.1 11.5 - 16.0 g/dL    HCT 39.8 35.0 - 47.0 %    MCV 95.7 80.0 - 99.0 FL    MCH 31.5 26.0 - 34.0 PG    MCHC 32.9 30.0 - 36.5 g/dL    RDW 13.0 11.5 - 14.5 %    PLATELET 906 122 - 180 K/uL    MPV 9.4 8.9 - 12.9 FL    NRBC 0.0 0  WBC    ABSOLUTE NRBC 0.00 0.00 - 0.01 K/uL   SAMPLES BEING HELD   Result Value Ref Range    SAMPLES BEING HELD URINE      COMMENT        Add-on orders for these samples will be processed based on acceptable specimen integrity and analyte stability, which may vary by analyte. AMB POC GLUCOSE BLOOD, BY GLUCOSE MONITORING DEVICE   Result Value Ref Range    Glucose POC 89 MG/DL   AMB POC LIPID PROFILE   Result Value Ref Range    Cholesterol (POC) 293     Triglycerides (POC) 102     HDL Cholesterol (POC) 65     LDL Cholesterol (POC) 207 MG/DL    Non-HDL Goal (POC) 227     TChol/HDL Ratio (POC) 4.5    AMB POC URINALYSIS DIP STICK AUTO W/O MICRO   Result Value Ref Range    Color (UA POC) Yellow     Clarity (UA POC) Clear     Glucose (UA POC) Negative Negative    Bilirubin (UA POC) Negative Negative    Ketones (UA POC) Negative Negative    Specific gravity (UA POC) 1.010 1.001 - 1.035    Blood (UA POC) Trace Negative    pH (UA POC) 5.5 4.6 - 8.0    Protein (UA POC) Negative Negative    Urobilinogen (UA POC) 0.2 mg/dL 0.2 - 1    Nitrites (UA POC) Negative Negative    Leukocyte esterase (UA POC) Trace Negative       Assessment/Plan:    ICD-10-CM ICD-9-CM    1. Encounter for routine adult medical examination  Z00.00 V70.0 LIPID PANEL      CBC W/O DIFF      METABOLIC PANEL, COMPREHENSIVE      HEMOGLOBIN A1C WITH EAG      AMB POC URINALYSIS DIP STICK AUTO W/O MICRO   2. Exposure to confirmed case of COVID-19  Z20.822 V01.79    3.  Essential hypertension  I10 401.9 CBC W/O DIFF      METABOLIC PANEL, COMPREHENSIVE      lisinopril-hydroCHLOROthiazide (PRINZIDE, ZESTORETIC) 10-12.5 mg per tablet      ergocalciferol (ERGOCALCIFEROL) 1,250 mcg (50,000 unit) capsule   4. Mixed hyperlipidemia  E78.2 272.2 LIPID PANEL      ergocalciferol (ERGOCALCIFEROL) 1,250 mcg (50,000 unit) capsule   5. Vitamin D deficiency  E55.9 268.9 VITAMIN D, 25 HYDROXY      ergocalciferol (ERGOCALCIFEROL) 1,250 mcg (50,000 unit) capsule   6. Depression, unspecified depression type  F32. A 311    7. Encounter for medication refill  Z76.0 V68.1 lisinopril-hydroCHLOROthiazide (PRINZIDE, ZESTORETIC) 10-12.5 mg per tablet      ergocalciferol (ERGOCALCIFEROL) 1,250 mcg (50,000 unit) capsule     Orders Placed This Encounter    LIPID PANEL     Standing Status:   Future     Standing Expiration Date:   2/9/2023    CBC W/O DIFF     Standing Status:   Future     Standing Expiration Date:   2/4/2717    METABOLIC PANEL, COMPREHENSIVE     Standing Status:   Future     Standing Expiration Date:   2/9/2023    HEMOGLOBIN A1C WITH EAG     Standing Status:   Future     Standing Expiration Date:   2/9/2023    VITAMIN D, 25 HYDROXY     Standing Status:   Future     Standing Expiration Date:   2/9/2023    AMB POC URINALYSIS DIP STICK AUTO W/O MICRO    lisinopril-hydroCHLOROthiazide (PRINZIDE, ZESTORETIC) 10-12.5 mg per tablet     Sig: Take 1 Tablet by mouth daily. Dispense:  90 Tablet     Refill:  0    ergocalciferol (ERGOCALCIFEROL) 1,250 mcg (50,000 unit) capsule     Sig: Take 1 Capsule by mouth every seven (7) days. Dispense:  12 Capsule     Refill:  0     Patient Instructions          9 Things To Do If You've Been Exposed to COVID-19  If you are fully vaccinated or have recently been sick with [id number], you may not need to quarantine. Stay home. If you've been exposed to the virus but don't have symptoms, you may need to stay in quarantine for up to 14 days. In some cases it may be shorter. Ask your doctor when it's safe to end your quarantine. Be sure to follow all instructions from your local health authorities. Don't go to school, work, or public areas.  And don't use public transportation, ride-shares, or taxis unless you have no choice. Leave your home only if you need to get medical care. But call the doctor's office first so they know you're coming. Call your doctor. Call your doctor or other health professional to let them know that you've been exposed. They might want you to be tested, or they may have other instructions for you. Wear a mask when you are around other people. It can help stop the spread of the virus. Limit contact with people in your home. If possible, stay in a separate bedroom and use a separate bathroom. Avoid contact with pets and other animals. Cover your mouth and nose with a tissue when you cough or sneeze. Then throw it in the trash right away. Wash your hands often, especially after you cough or sneeze. Use soap and water, and scrub for at least 20 seconds. If soap and water aren't available, use an alcohol-based hand . Don't share personal household items. These include bedding, towels, cups and glasses, and eating utensils. Clean and disinfect your home every day. Use household  or disinfectant wipes or sprays. Current as of: March 26, 2021               Content Version: 13.0  © 2006-2021 [employer]wise, [employer]. Care instructions adapted under license by [employer] (which disclaims liability or warranty for this information). If you have questions about a medical condition or this instruction, always ask your healthcare professional. [place] [address] any warranty or liability for your use of this information. High Blood Pressure: Care Instructions  Overview     It's normal for blood pressure to go up and down throughout the day. But if it stays up, you have high blood pressure. Another name for high blood pressure is hypertension. Despite what a lot of people think, high blood pressure usually doesn't cause headaches or make you feel dizzy or lightheaded. It usually has no symptoms. But it does increase your risk of stroke, heart attack, and other problems. You and your doctor will talk about your risks of these problems based on your blood pressure. Your doctor will give you a goal for your blood pressure. Your goal will be based on your health and your age. Lifestyle changes, such as eating healthy and being active, are always important to help lower blood pressure. You might also take medicine to reach your blood pressure goal.  Follow-up care is a key part of your treatment and safety. Be sure to make and go to all appointments, and call your doctor if you are having problems. It's also a good idea to know your test results and keep a list of the medicines you take. How can you care for yourself at home? Medical treatment  · If you stop taking your medicine, your blood pressure will go back up. You may take one or more types of medicine to lower your blood pressure. Be safe with medicines. Take your medicine exactly as prescribed. Call your doctor if you think you are having a problem with your medicine. · Talk to your doctor before you start taking aspirin every day. Aspirin can help certain people lower their risk of a heart attack or stroke. But taking aspirin isn't right for everyone, because it can cause serious bleeding. · See your doctor regularly. You may need to see the doctor more often at first or until your blood pressure comes down. · If you are taking blood pressure medicine, talk to your doctor before you take decongestants or anti-inflammatory medicine, such as ibuprofen. Some of these medicines can raise blood pressure. · Learn how to check your blood pressure at home. Lifestyle changes  · Stay at a healthy weight. This is especially important if you put on weight around the waist. Losing even 10 pounds can help you lower your blood pressure. · If your doctor recommends it, get more exercise. Walking is a good choice.  Bit by bit, increase the amount you walk every day. Try for at least 30 minutes on most days of the week. You also may want to swim, bike, or do other activities. · Avoid or limit alcohol. Talk to your doctor about whether you can drink any alcohol. · Try to limit how much sodium you eat to less than 2,300 milligrams (mg) a day. Your doctor may ask you to try to eat less than 1,500 mg a day. · Eat plenty of fruits (such as bananas and oranges), vegetables, legumes, whole grains, and low-fat dairy products. · Lower the amount of saturated fat in your diet. Saturated fat is found in animal products such as milk, cheese, and meat. Limiting these foods may help you lose weight and also lower your risk for heart disease. · Do not smoke. Smoking increases your risk for heart attack and stroke. If you need help quitting, talk to your doctor about stop-smoking programs and medicines. These can increase your chances of quitting for good. When should you call for help? Call 911  anytime you think you may need emergency care. This may mean having symptoms that suggest that your blood pressure is causing a serious heart or blood vessel problem. Your blood pressure may be over 180/120. For example, call 911 if:    · You have symptoms of a heart attack. These may include:  ? Chest pain or pressure, or a strange feeling in the chest.  ? Sweating. ? Shortness of breath. ? Nausea or vomiting. ? Pain, pressure, or a strange feeling in the back, neck, jaw, or upper belly or in one or both shoulders or arms. ? Lightheadedness or sudden weakness. ? A fast or irregular heartbeat.     · You have symptoms of a stroke. These may include:  ? Sudden numbness, tingling, weakness, or loss of movement in your face, arm, or leg, especially on only one side of your body. ? Sudden vision changes. ? Sudden trouble speaking. ? Sudden confusion or trouble understanding simple statements. ? Sudden problems with walking or balance.   ? A sudden, severe headache that is different from past headaches.     · You have severe back or belly pain. Do not wait until your blood pressure comes down on its own. Get help right away. Call your doctor now or seek immediate care if:    · Your blood pressure is much higher than normal (such as 180/120 or higher), but you don't have symptoms.     · You think high blood pressure is causing symptoms, such as:  ? Severe headache.  ? Blurry vision. Watch closely for changes in your health, and be sure to contact your doctor if:    · Your blood pressure measures higher than your doctor recommends at least 2 times. That means the top number is higher or the bottom number is higher, or both.     · You think you may be having side effects from your blood pressure medicine. Where can you learn more? Go to http://www.gray.com/  Enter [id number] in the search box to learn more about \"High Blood Pressure: Care Instructions. \"  Current as of: April 29, 2021               Content Version: 13.0  © 2006-2021 [employer]. Care instructions adapted under license by [email or web address] (which disclaims liability or warranty for this information). If you have questions about a medical condition or this instruction, always ask your healthcare professional. [address] any warranty or liability for your use of this information. High Cholesterol: Care Instructions  Overview     Cholesterol is a type of fat in your blood. It is needed for many body functions, such as making new cells. Cholesterol is made by your body. It also comes from food you eat. High cholesterol means that you have too much of the fat in your blood. This raises your risk of a heart attack and stroke. LDL and HDL are part of your total cholesterol. LDL is the \"bad\" cholesterol. High LDL can raise your risk for coronary artery disease, heart attack, and stroke. HDL is the \"good\" cholesterol.  It helps clear bad cholesterol from the body. High HDL is linked with a lower risk of coronary artery disease, heart attack, and stroke. Your cholesterol levels help your doctor find out your risk for having a heart attack or stroke. You and your doctor can talk about whether you need to lower your risk and what treatment is best for you. Treatment options include a heart-healthy lifestyle and medicine. Both options can help lower your cholesterol and your risk. The way you choose to lower your risk will depend on how high your risk is for heart attack and stroke. It will also depend on how you feel about taking medicines. Follow-up care is a key part of your treatment and safety. Be sure to make and go to all appointments, and call your doctor if you are having problems. It's also a good idea to know your test results and keep a list of the medicines you take. How can you care for yourself at home? · Eat heart-healthy foods. ? Eat fruits, vegetables, whole grains, beans, and other high-fiber foods. ? Eat lean proteins, such as seafood, lean meats, beans, nuts, and soy products. ? Eat healthy fats, such as canola and olive oil. ? Choose foods that are low in saturated fat. ? Limit sodium and alcohol. ? Limit drinks and foods with added sugar. · Be physically active. Try to do moderate activity at least 2½ hours a week. Or try to do vigorous activity at least 1¼ hours a week. You may want to walk or try other activities, such as running, swimming, cycling, or playing tennis or team sports. · Stay at a healthy weight or lose weight by making the changes in eating and physical activity listed above. Losing just a small amount of weight, even 5 to 10 pounds, can help reduce your risk for having a heart attack or stroke. · Do not smoke. · Manage other health problems. These include diabetes and high blood pressure. If you think you may have a problem with alcohol or drug use, talk to your doctor.   · If you take medicine, take it exactly as prescribed. Call your doctor if you think you are having a problem with your medicine. · Check with your doctor or pharmacist before you use any other medicines, including over-the-counter medicines. Make sure your doctor knows all of the medicines, vitamins, herbal products, and supplements you take. Taking some medicines together can cause problems. When should you call for help? Watch closely for changes in your health, and be sure to contact your doctor if:    · You need help making lifestyle changes.     · You have questions about your medicine. Where can you learn more? Go to http://www.gray.com/  Enter B289 in the search box to learn more about \"High Cholesterol: Care Instructions. \"  Current as of: April 29, 2021               Content Version: 13.0  © 7184-3606 [email or web address]. Care instructions adapted under license by [employer] (which disclaims liability or warranty for this information). If you have questions about a medical condition or this instruction, always ask your healthcare professional. [place] [address] any warranty or liability for your use of this information. Follow-up and Dispositions    · Return in about 2 weeks (around 2/23/2022), or if symptoms worsen or fail to improve, complete labs at least 5 days prior to your appointment, for Discuss Results and Care Gaps. I have reviewed with the patient details of the assessment and plan and all questions were answered. Relevent patient education was performed. The most recent lab findings were reviewed with the patient. An After Visit Summary was printed and given to the patient.

## 2022-02-22 DIAGNOSIS — F32.A DEPRESSION, UNSPECIFIED DEPRESSION TYPE: ICD-10-CM

## 2022-02-22 DIAGNOSIS — F43.21 GRIEF AT LOSS OF CHILD: ICD-10-CM

## 2022-02-22 DIAGNOSIS — Z63.4 GRIEF AT LOSS OF CHILD: ICD-10-CM

## 2022-02-22 SDOH — SOCIAL STABILITY - SOCIAL INSECURITY: DISSAPEARANCE AND DEATH OF FAMILY MEMBER: Z63.4

## 2022-02-22 NOTE — TELEPHONE ENCOUNTER
Last visit 02/09/2022 Virtual visit JENNY [place]   Next appointment 2 weeks )03/2022) - Nothing scheduled   Previous refill encounter(s)   01/25/2022 Zoloft #30     Requested Prescriptions     Pending Prescriptions Disp Refills    sertraline (ZOLOFT) 25 mg tablet 30 Tablet 2     Sig: Take 1 Tablet by mouth daily.

## 2022-02-23 RX ORDER — SERTRALINE HYDROCHLORIDE 25 MG/1
25 TABLET, FILM COATED ORAL DAILY
Qty: 30 TABLET | Refills: 2 | Status: SHIPPED | OUTPATIENT
Start: 2022-02-23 | End: 2022-05-20 | Stop reason: SDUPTHER

## 2022-02-25 DIAGNOSIS — E78.2 MIXED HYPERLIPIDEMIA: ICD-10-CM

## 2022-02-25 NOTE — TELEPHONE ENCOUNTER
Last visit 02/09/2022 Virtual visit JENNY [place]   Next appointment 2 weeks (03/2022) - Nothing scheduled  Previous refill encounter(s)   12/02/2021 Lipitor #30 with 2 refills. Requested Prescriptions     Pending Prescriptions Disp Refills    atorvastatin (LIPITOR) 20 mg tablet 30 Tablet 2     Sig: Take 1 Tablet by mouth daily.  Indications: excessive fat in the blood

## 2022-03-01 RX ORDER — ATORVASTATIN CALCIUM 20 MG/1
20 TABLET, FILM COATED ORAL DAILY
Qty: 30 TABLET | Refills: 2 | Status: SHIPPED | OUTPATIENT
Start: 2022-03-01 | End: 2022-05-18 | Stop reason: SDUPTHER

## 2022-05-04 DIAGNOSIS — Z76.0 ENCOUNTER FOR MEDICATION REFILL: ICD-10-CM

## 2022-05-04 DIAGNOSIS — E78.2 MIXED HYPERLIPIDEMIA: ICD-10-CM

## 2022-05-04 DIAGNOSIS — I10 ESSENTIAL HYPERTENSION: ICD-10-CM

## 2022-05-04 DIAGNOSIS — E55.9 VITAMIN D DEFICIENCY: ICD-10-CM

## 2022-05-04 RX ORDER — ERGOCALCIFEROL 1.25 MG/1
50000 CAPSULE ORAL
Qty: 4 CAPSULE | Refills: 0 | Status: SHIPPED | OUTPATIENT
Start: 2022-05-04 | End: 2022-06-08 | Stop reason: SDUPTHER

## 2022-05-04 NOTE — TELEPHONE ENCOUNTER
Last visit 02/09/2022 Virtual visit JENNY [place]   Next appointment 2 weeks (03/2022) - Nothing scheduled  Previous refill encounter(s)   02/09/2022 Vitamin D2 #12     Requested Prescriptions     Pending Prescriptions Disp Refills    ergocalciferol (ERGOCALCIFEROL) 1,250 mcg (50,000 unit) capsule 4 Capsule 0     Sig: Take 1 Capsule by mouth every seven (7) days.

## 2022-05-17 DIAGNOSIS — I10 ESSENTIAL HYPERTENSION: ICD-10-CM

## 2022-05-17 DIAGNOSIS — Z76.0 ENCOUNTER FOR MEDICATION REFILL: ICD-10-CM

## 2022-05-17 RX ORDER — LISINOPRIL AND HYDROCHLOROTHIAZIDE 10; 12.5 MG/1; MG/1
1 TABLET ORAL DAILY
Qty: 90 TABLET | Refills: 0 | Status: SHIPPED | OUTPATIENT
Start: 2022-05-17 | End: 2022-07-26 | Stop reason: SDUPTHER

## 2022-05-17 NOTE — TELEPHONE ENCOUNTER
Last visit 02/09/2022 Virtual visit JENNY [place]   Next appointment 05/20/2022 JENNY [place]   Previous refill encounter(s)   02/09/2022 Prinzide #90     Requested Prescriptions     Pending Prescriptions Disp Refills    lisinopril-hydroCHLOROthiazide (PRINZIDE, ZESTORETIC) 10-12.5 mg per tablet 90 Tablet 0     Sig: Take 1 Tablet by mouth daily.

## 2022-05-18 DIAGNOSIS — E78.2 MIXED HYPERLIPIDEMIA: ICD-10-CM

## 2022-05-18 NOTE — TELEPHONE ENCOUNTER
Last visit 02/09/2022 Virtual visit JENNY [place]   Next appointment 05/20/2022 JENNY [place]  Previous refill encounter(s)   03/01/2022 Lipitor #30 with 2 refill     Requested Prescriptions     Pending Prescriptions Disp Refills    atorvastatin (LIPITOR) 20 mg tablet 30 Tablet 0     Sig: Take 1 Tablet by mouth daily.  Indications: excessive fat in the blood

## 2022-05-19 RX ORDER — ATORVASTATIN CALCIUM 20 MG/1
20 TABLET, FILM COATED ORAL DAILY
Qty: 30 TABLET | Refills: 0 | Status: SHIPPED | OUTPATIENT
Start: 2022-05-19 | End: 2022-07-26 | Stop reason: SDUPTHER

## 2022-05-20 ENCOUNTER — OFFICE VISIT (OUTPATIENT)
Dept: INTERNAL MEDICINE CLINIC | Age: 62
End: 2022-05-20
Payer: COMMERCIAL

## 2022-05-20 VITALS
RESPIRATION RATE: 16 BRPM | WEIGHT: 206 LBS | TEMPERATURE: 98 F | HEART RATE: 71 BPM | HEIGHT: 67 IN | OXYGEN SATURATION: 98 % | DIASTOLIC BLOOD PRESSURE: 82 MMHG | BODY MASS INDEX: 32.33 KG/M2 | SYSTOLIC BLOOD PRESSURE: 114 MMHG

## 2022-05-20 DIAGNOSIS — R68.89 FLUCTUATION OF WEIGHT: ICD-10-CM

## 2022-05-20 DIAGNOSIS — E78.2 MIXED HYPERLIPIDEMIA: ICD-10-CM

## 2022-05-20 DIAGNOSIS — F43.21 GRIEF AT LOSS OF CHILD: ICD-10-CM

## 2022-05-20 DIAGNOSIS — F32.A DEPRESSION, UNSPECIFIED DEPRESSION TYPE: ICD-10-CM

## 2022-05-20 DIAGNOSIS — I10 ESSENTIAL HYPERTENSION: ICD-10-CM

## 2022-05-20 DIAGNOSIS — E55.9 VITAMIN D DEFICIENCY: ICD-10-CM

## 2022-05-20 DIAGNOSIS — Z00.00 ENCOUNTER FOR ROUTINE ADULT MEDICAL EXAMINATION: Primary | ICD-10-CM

## 2022-05-20 DIAGNOSIS — Z63.4 GRIEF AT LOSS OF CHILD: ICD-10-CM

## 2022-05-20 PROCEDURE — 99214 OFFICE O/P EST MOD 30 MIN: CPT | Performed by: NURSE PRACTITIONER

## 2022-05-20 RX ORDER — SERTRALINE HYDROCHLORIDE 25 MG/1
25 TABLET, FILM COATED ORAL DAILY
Qty: 30 TABLET | Refills: 2 | Status: SHIPPED | OUTPATIENT
Start: 2022-05-20 | End: 2022-07-26 | Stop reason: SDUPTHER

## 2022-05-20 SDOH — SOCIAL STABILITY - SOCIAL INSECURITY: DISSAPEARANCE AND DEATH OF FAMILY MEMBER: Z63.4

## 2022-05-20 NOTE — PATIENT INSTRUCTIONS
Learning About High Cholesterol  What is high cholesterol? High cholesterol means that you have too much cholesterol in your blood. Cholesterol is a type of fat. It's needed for many body functions, such as making new cells. Cholesterol is made by your body. It also comes from food you eat. Having high cholesterol can lead to the buildup of plaque in artery walls. This can increase your risk of heart attack and stroke. When your doctor talks about high cholesterol levels, your doctor is talking about your total cholesterol and LDL cholesterol (the \"bad\" cholesterol) levels. Your doctor may also speak about HDL (the \"good\" cholesterol) levels. High HDL is linked with a lower risk for coronary artery disease, heart attack, and stroke. Your cholesterol levels help your doctor find out your risk for having a heart attack or stroke. How can you help prevent high cholesterol? A heart-healthy lifestyle can help you prevent high cholesterol and lower your risk for a heart attack and stroke. · Eat heart-healthy foods. ? Eat fruits, vegetables, whole grains, beans, and other high-fiber foods. ? Eat lean proteins, such as seafood, lean meats, beans, nuts, and soy products. ? Eat healthy fats, such as canola and olive oil. ? Choose foods that are low in saturated fat. ? Limit sodium and alcohol. ? Limit drinks and foods with added sugar. · Be active. Try to do moderate activity at least 2½ hours a week. Or try vigorous activity at least 1¼ hours a week. You may want to walk or try other activities, such as running, swimming, cycling, or playing tennis or team sports. · Stay at a healthy weight. Lose weight if you need to. · Don't smoke. If you need help quitting, talk to your doctor about stop-smoking programs and medicines. These can increase your chances of quitting for good. How is high cholesterol treated? The goal of treatment is to reduce your chances of having a heart attack or stroke.  The goal is not to lower your cholesterol numbers only. · Have a heart-healthy lifestyle. This includes eating healthy foods, not smoking, losing weight, and being more active. · You may choose to take medicine. Follow-up care is a key part of your treatment and safety. Be sure to make and go to all appointments, and call your doctor if you are having problems. It's also a good idea to know your test results and keep a list of the medicines you take. Where can you learn more? Go to http://www.george.com/  Enter Q621 in the search box to learn more about \"Learning About High Cholesterol. \"  Current as of: January 10, 2022               Content Version: 13.2  © 2006-2022 [employer]. Care instructions adapted under license by [employer] (which disclaims liability or warranty for this information). If you have questions about a medical condition or this instruction, always ask your healthcare professional. [address] any warranty or liability for your use of this information. High Blood Pressure: Care Instructions  Overview     It's normal for blood pressure to go up and down throughout the day. But if it stays up, you have high blood pressure. Another name for high blood pressure is hypertension. Despite what a lot of people think, high blood pressure usually doesn't cause headaches or make you feel dizzy or lightheaded. It usually has no symptoms. But it does increase your risk of stroke, heart attack, and other problems. You and your doctor will talk about your risks of these problems based on your blood pressure. Your doctor will give you a goal for your blood pressure. Your goal will be based on your health and your age. Lifestyle changes, such as eating healthy and being active, are always important to help lower blood pressure.  You might also take medicine to reach your blood pressure goal.  Follow-up care is a key part of your treatment and safety. Be sure to make and go to all appointments, and call your doctor if you are having problems. It's also a good idea to know your test results and keep a list of the medicines you take. How can you care for yourself at home? Medical treatment  · If you stop taking your medicine, your blood pressure will go back up. You may take one or more types of medicine to lower your blood pressure. Be safe with medicines. Take your medicine exactly as prescribed. Call your doctor if you think you are having a problem with your medicine. · Talk to your doctor before you start taking aspirin every day. Aspirin can help certain people lower their risk of a heart attack or stroke. But taking aspirin isn't right for everyone, because it can cause serious bleeding. · See your doctor regularly. You may need to see the doctor more often at first or until your blood pressure comes down. · If you are taking blood pressure medicine, talk to your doctor before you take decongestants or anti-inflammatory medicine, such as ibuprofen. Some of these medicines can raise blood pressure. · Learn how to check your blood pressure at home. Lifestyle changes  · Stay at a healthy weight. This is especially important if you put on weight around the waist. Losing even 10 pounds can help you lower your blood pressure. · If your doctor recommends it, get more exercise. Walking is a good choice. Bit by bit, increase the amount you walk every day. Try for at least 30 minutes on most days of the week. You also may want to swim, bike, or do other activities. · Avoid or limit alcohol. Talk to your doctor about whether you can drink any alcohol. · Try to limit how much sodium you eat to less than 2,300 milligrams (mg) a day. Your doctor may ask you to try to eat less than 1,500 mg a day. · Eat plenty of fruits (such as bananas and oranges), vegetables, legumes, whole grains, and low-fat dairy products.   · Lower the amount of saturated fat in your diet. Saturated fat is found in animal products such as milk, cheese, and meat. Limiting these foods may help you lose weight and also lower your risk for heart disease. · Do not smoke. Smoking increases your risk for heart attack and stroke. If you need help quitting, talk to your doctor about stop-smoking programs and medicines. These can increase your chances of quitting for good. When should you call for help? Call 911  anytime you think you may need emergency care. This may mean having symptoms that suggest that your blood pressure is causing a serious heart or blood vessel problem. Your blood pressure may be over 180/120. For example, call 911 if:    · You have symptoms of a heart attack. These may include:  ? Chest pain or pressure, or a strange feeling in the chest.  ? Sweating. ? Shortness of breath. ? Nausea or vomiting. ? Pain, pressure, or a strange feeling in the back, neck, jaw, or upper belly or in one or both shoulders or arms. ? Lightheadedness or sudden weakness. ? A fast or irregular heartbeat.     · You have symptoms of a stroke. These may include:  ? Sudden numbness, tingling, weakness, or loss of movement in your face, arm, or leg, especially on only one side of your body. ? Sudden vision changes. ? Sudden trouble speaking. ? Sudden confusion or trouble understanding simple statements. ? Sudden problems with walking or balance. ? A sudden, severe headache that is different from past headaches.     · You have severe back or belly pain. Do not wait until your blood pressure comes down on its own. Get help right away. Call your doctor now or seek immediate care if:    · Your blood pressure is much higher than normal (such as 180/120 or higher), but you don't have symptoms.     · You think high blood pressure is causing symptoms, such as:  ? Severe headache.  ? Blurry vision.    Watch closely for changes in your health, and be sure to contact your doctor if:    · Your blood pressure measures higher than your doctor recommends at least 2 times. That means the top number is higher or the bottom number is higher, or both.     · You think you may be having side effects from your blood pressure medicine. Where can you learn more? Go to http://www.gray.com/  Enter [id number] in the search box to learn more about \"High Blood Pressure: Care Instructions. \"  Current as of: January 10, 2022               Content Version: 13.2  © 2006-2022 [employer]. Care instructions adapted under license by [employer] (which disclaims liability or warranty for this information). If you have questions about a medical condition or this instruction, always ask your healthcare professional. [address] any warranty or liability for your use of this information. Learning About Vitamin D  Why is it important to get enough vitamin D? Your body needs vitamin D to absorb calcium. Calcium keeps your bones and muscles, including your heart, healthy and strong. If your muscles don't get enough calcium, they can cramp, hurt, or feel weak. You may have long-term (chronic) muscle aches and pains. If you don't get enough vitamin D throughout life, you have an increased chance of having thin and brittle bones (osteoporosis) in your later years. Children who don't get enough vitamin D may not grow as much as others their age. They also have a chance of getting a rare disease called rickets. It causes weak bones. Vitamin D and calcium are added to many foods. And your body uses sunshine to make its own vitamin D. How much vitamin D do you need? The recommended dietary allowance (RDA) for vitamin D is 600 IU (international units) every day for people ages 3 through 79. Adults 71 and older need 800 IU every day. How can you get more vitamin D? Foods that contain vitamin D include:  · [place], tuna, and mackerel.  These are some of the best foods to eat when you need to get more vitamin D.  · Cheese, egg yolks, and beef liver. These foods have vitamin D in small amounts. · Milk, soy drinks, orange juice, yogurt, margarine, and some kinds of cereal have vitamin D added to them. Some people don't make vitamin D as well as others. They may have to take extra care in getting enough vitamin D. Things that reduce how much vitamin D your body makes include:  · Having dark skin. · Age, especially if you are older than 72. · Digestive problems, such as Crohn's or celiac disease. · Liver and kidney disease. Some people who do not get enough vitamin D may need supplements. Are there any risks from taking vitamin D?  · Too much vitamin D:  ? Can damage your kidneys. ? Can cause nausea and vomiting, constipation, and weakness. ? Raises the amount of calcium in your blood. If this happens, you can get confused or have an irregular heart rhythm. · Vitamin D may interact with other medicines. Tell your doctor about all of the medicines you take, including over-the-counter drugs, herbs, and pills. Tell your doctor about all of your current medical problems. Where can you learn more? Go to http://www.[employer].com/  Enter I930 in the search box to learn more about \"Learning About Vitamin D.\"  Current as of: September 8, 2021               Content Version: 13.2  © 2030-5923 [employer]. Care instructions adapted under license by [employer] (which disclaims liability or warranty for this information). If you have questions about a medical condition or this instruction, always ask your healthcare professional. [place] [address] any warranty or liability for your use of this information.

## 2022-05-20 NOTE — PROGRESS NOTES
1. Have you been to the ER, urgent care clinic since your last visit? Hospitalized since your last visit?no    2. Have you seen or consulted any other health care providers outside of the [address] [place] [address] since your last visit? Include any pap smears or colon screening. No    Chief Complaint   Patient presents with    Cholesterol Problem    Hypertension    Vitamin D Deficiency     ,  verbal order given for needed amb poc labs.

## 2022-05-21 LAB
25(OH)D3+25(OH)D2 SERPL-MCNC: 32.9 NG/ML (ref 30–100)
ALBUMIN SERPL-MCNC: 4.7 G/DL (ref 3.8–4.8)
ALBUMIN/GLOB SERPL: 1.6 {RATIO} (ref 1.2–2.2)
ALP SERPL-CCNC: 104 IU/L (ref 44–121)
ALT SERPL-CCNC: 13 IU/L (ref 0–32)
AST SERPL-CCNC: 12 IU/L (ref 0–40)
BILIRUB SERPL-MCNC: 0.5 MG/DL (ref 0–1.2)
BUN SERPL-MCNC: 12 MG/DL (ref 8–27)
BUN/CREAT SERPL: 15 (ref 12–28)
CALCIUM SERPL-MCNC: 10 MG/DL (ref 8.7–10.3)
CHLORIDE SERPL-SCNC: 101 MMOL/L (ref 96–106)
CHOLEST SERPL-MCNC: 192 MG/DL (ref 100–199)
CO2 SERPL-SCNC: 24 MMOL/L (ref 20–29)
CREAT SERPL-MCNC: 0.79 MG/DL (ref 0.57–1)
EGFR: 85 ML/MIN/1.73
ERYTHROCYTE [DISTWIDTH] IN BLOOD BY AUTOMATED COUNT: 12 % (ref 11.7–15.4)
EST. AVERAGE GLUCOSE BLD GHB EST-MCNC: 117 MG/DL
GLOBULIN SER CALC-MCNC: 2.9 G/DL (ref 1.5–4.5)
GLUCOSE SERPL-MCNC: 102 MG/DL (ref 65–99)
HBA1C MFR BLD: 5.7 % (ref 4.8–5.6)
HCT VFR BLD AUTO: 39.5 % (ref 34–46.6)
HDLC SERPL-MCNC: 63 MG/DL
HGB BLD-MCNC: 13 G/DL (ref 11.1–15.9)
IMP & REVIEW OF LAB RESULTS: NORMAL
LDLC SERPL CALC-MCNC: 110 MG/DL (ref 0–99)
MCH RBC QN AUTO: 31 PG (ref 26.6–33)
MCHC RBC AUTO-ENTMCNC: 32.9 G/DL (ref 31.5–35.7)
MCV RBC AUTO: 94 FL (ref 79–97)
PLATELET # BLD AUTO: 272 X10E3/UL (ref 150–450)
POTASSIUM SERPL-SCNC: 3.7 MMOL/L (ref 3.5–5.2)
PROT SERPL-MCNC: 7.6 G/DL (ref 6–8.5)
RBC # BLD AUTO: 4.19 X10E6/UL (ref 3.77–5.28)
SODIUM SERPL-SCNC: 140 MMOL/L (ref 134–144)
TRIGL SERPL-MCNC: 108 MG/DL (ref 0–149)
VLDLC SERPL CALC-MCNC: 19 MG/DL (ref 5–40)
WBC # BLD AUTO: 6.2 X10E3/UL (ref 3.4–10.8)

## 2022-05-23 NOTE — PROGRESS NOTES
Results were received and reviewed. Patient has a follow-up appointment on 6-8-2022 to Discuss Test results.

## 2022-05-23 NOTE — PROGRESS NOTES
Subjective:  Jasvir Goss is a 65 y.o. female and presents with Hyperlipidemia, Hypertension, Vitamin D Deficiency and Depression for a Routine Medical Exam and Labs.     Hyperlipidemia Review:  Patient presents for follow up and evaluation of lipids. Compliance with treatment thus far has not been excellent, a repeat fasting lipid profile was done at today's office visit. Patient is not adhering to dietary recommendations. Patient states that she does not exercise regularly, was advised of exercise recommendations of 150 minutes a week. The patient does not use medications that may worsen dyslipidemias (corticosteroids, progestins, anabolic steroids, amiodarone, cyclosporine, olanzapine).        Hypertension Review:  The patient has essential hypertension  Diet and Lifestyle: generally follows a  low sodium diet, exercises sporadically  Home BP Monitoring: is not measured at home. Pertinent ROS: taking medications as instructed, no medication side effects noted, no TIA's, no chest pain on exertion, no dyspnea on exertion, no swelling of ankles.      Vitamin D Deficiency Review  Patient presents with Vitamin D deficiency. Patient denies symptoms of bone pain, muscle weakness, Fatigue or other neurological symptoms. Labs were ordered at today's office visit to assess the need for continued  Vitamin D replacement     Depression Review  Patient is being seen for follow-up of depression. Ongoing symptoms include depressed mood, insomnia, psychomotor agitation, psychomotor retardation, fatigue, feelings of worthlessness/guilt, difficulty concentrating and hopelessness. The patient denies recurrent thoughts of death and suicidal thoughts without plan.      Review of Systems  Constitutional: negative for fevers, chills, anorexia and weight loss  Eyes:               negative for visual disturbance and irritation  ENT:                AJRRAGCF for tinnitus,sore throat,nasal congestion,ear pains. hoarseness  Respiratory:     negative for cough, hemoptysis, dyspnea,wheezing  CV:                  negative for chest pain, palpitations, lower extremity edema  GI:                   negative for nausea, vomiting, diarrhea, abdominal pain,melena  Endo:               negative for polyuria,polydipsia,polyphagia,heat intolerance  Genitourinary: negative for frequency, dysuria and hematuria  Integument:     negative for rash and pruritus  Hematologic:   negative for easy bruising and gum/nose bleeding  Musculoskel:   negative for myalgias, arthralgias, back pain, muscle weakness, joint pain  Neurological:   negative for headaches, dizziness, vertigo, memory problems and gait   Behavl/Psych: positive for feelings of anxiety, depression, mood changes    Past Medical History:   Diagnosis Date    Blindness of left eye     Hypertension      Past Surgical History:   Procedure Laterality Date    HX GYN       Social History     Socioeconomic History    Marital status: UNKNOWN   Tobacco Use    Smoking status: Former Smoker     Packs/day: 3.00    Smokeless tobacco: Never Used    Tobacco comment: smoker for 17 years; 3 cigs a day   Substance and Sexual Activity    Alcohol use: Yes     Alcohol/week: 1.0 standard drink     Types: 1 Glasses of wine per week     Comment: social    Drug use: No    Sexual activity: Never     Family History   Problem Relation Age of Onset    Hypertension Mother     Hypertension Father     Hypertension Sister     Hypertension Brother      Current Outpatient Medications   Medication Sig Dispense Refill    sertraline (ZOLOFT) 25 mg tablet Take 1 Tablet by mouth daily. 30 Tablet 2    atorvastatin (LIPITOR) 20 mg tablet Take 1 Tablet by mouth daily. Indications: excessive fat in the blood 30 Tablet 0    lisinopril-hydroCHLOROthiazide (PRINZIDE, ZESTORETIC) 10-12.5 mg per tablet Take 1 Tablet by mouth daily.  90 Tablet 0    ergocalciferol (ERGOCALCIFEROL) 1,250 mcg (50,000 unit) capsule Take 1 Capsule by mouth every seven (7) days. 4 Capsule 0     No Known Allergies    Objective:  Visit Vitals  /82   Pulse 71   Temp 98 °F (36.7 °C) (Oral)   Resp 16   Ht 5' 7\" (1.702 m)   Wt 206 lb (93.4 kg)   SpO2 98%   BMI 32.26 kg/m²     Physical Exam:   General appearance - alert, well appearing, and in no distress  Mental status - alert, oriented to person, place, and time  EYE-PRASHANTH, EOMI, corneas normal, no foreign bodies  ENT-ENT exam normal, no neck nodes or sinus tenderness  Nose - normal and patent, no erythema, discharge or polyps  Mouth - mucous membranes moist, pharynx normal without lesions  Neck - supple, no significant adenopathy   Chest - clear to auscultation, no wheezes, rales or rhonchi, symmetric air entry   Heart - normal rate, regular rhythm, normal S1, S2, no murmurs, rubs, clicks or gallops   Abdomen - soft, nontender, nondistended, no masses or organomegaly  Lymph- no adenopathy palpable  Ext-peripheral pulses normal, no pedal edema, no clubbing or cyanosis  Skin-Warm and dry.  no hyperpigmentation, vitiligo, or suspicious lesions  Neuro -alert, oriented, normal speech, no focal findings or movement disorder noted  Musculoskeletal-normal C-spine, no tenderness, full ROM without pain  Feet-no nail deformities or callus formation with good pulses noted      Results for orders placed or performed in visit on 05/20/22   HEMOGLOBIN A1C WITH EAG   Result Value Ref Range    Hemoglobin A1c 5.7 (H) 4.8 - 5.6 %    Estimated average glucose 117 mg/dL   CBC W/O DIFF   Result Value Ref Range    WBC 6.2 3.4 - 10.8 x10E3/uL    RBC 4.19 3.77 - 5.28 x10E6/uL    HGB 13.0 11.1 - 15.9 g/dL    HCT 39.5 34.0 - 46.6 %    MCV 94 79 - 97 fL    MCH 31.0 26.6 - 33.0 pg    MCHC 32.9 31.5 - 35.7 g/dL    RDW 12.0 11.7 - 15.4 %    PLATELET 309 195 - 583 x10E3/uL   LIPID PANEL   Result Value Ref Range    Cholesterol, total 192 100 - 199 mg/dL    Triglyceride 108 0 - 149 mg/dL    HDL Cholesterol 63 >39 mg/dL    VLDL, calculated 19 5 - 40 mg/dL LDL, calculated 110 (H) 0 - 99 mg/dL   VITAMIN D, 25 HYDROXY   Result Value Ref Range    VITAMIN D, 25-HYDROXY 32.9 30.0 - 622.4 ng/mL   METABOLIC PANEL, COMPREHENSIVE   Result Value Ref Range    Glucose 102 (H) 65 - 99 mg/dL    BUN 12 8 - 27 mg/dL    Creatinine 0.79 0.57 - 1.00 mg/dL    eGFR 85 >59 mL/min/1.73    BUN/Creatinine ratio 15 12 - 28    Sodium 140 134 - 144 mmol/L    Potassium 3.7 3.5 - 5.2 mmol/L    Chloride 101 96 - 106 mmol/L    CO2 24 20 - 29 mmol/L    Calcium 10.0 8.7 - 10.3 mg/dL    Protein, total 7.6 6.0 - 8.5 g/dL    Albumin 4.7 3.8 - 4.8 g/dL    GLOBULIN, TOTAL 2.9 1.5 - 4.5 g/dL    A-G Ratio 1.6 1.2 - 2.2    Bilirubin, total 0.5 0.0 - 1.2 mg/dL    Alk. phosphatase 104 44 - 121 IU/L    AST (SGOT) 12 0 - 40 IU/L    ALT (SGPT) 13 0 - 32 IU/L   CVD REPORT   Result Value Ref Range    INTERPRETATION Note        Assessment/Plan:    ICD-10-CM ICD-9-CM    1. Encounter for routine adult medical examination  Z00.00 V70.0 HEMOGLOBIN A1C WITH EAG      CBC W/O DIFF      LIPID PANEL      METABOLIC PANEL, COMPREHENSIVE      AMB POC URINALYSIS DIP STICK AUTO W/O MICRO      HEMOGLOBIN A1C WITH EAG      CBC W/O DIFF      LIPID PANEL      METABOLIC PANEL, COMPREHENSIVE   2. Mixed hyperlipidemia  E78.2 272.2 LIPID PANEL      LIPID PANEL   3. Essential hypertension  I10 401.9 CBC W/O DIFF      METABOLIC PANEL, COMPREHENSIVE      CBC W/O DIFF      METABOLIC PANEL, COMPREHENSIVE   4. Vitamin D deficiency  E55.9 268.9 VITAMIN D, 25 HYDROXY      VITAMIN D, 25 HYDROXY   5. Depression, unspecified depression type  F32. A 311 sertraline (ZOLOFT) 25 mg tablet   6. Fluctuation of weight  R68.89 780.99    7.  Grief at loss of child  F43.21 309.0 sertraline (ZOLOFT) 25 mg tablet    Z63.4       Orders Placed This Encounter    HEMOGLOBIN A1C WITH EAG     Standing Status:   Future     Number of Occurrences:   1     Standing Expiration Date:   5/20/2023    CBC W/O DIFF     Standing Status:   Future     Number of Occurrences:   1 Standing Expiration Date:   5/20/2023    LIPID PANEL     Standing Status:   Future     Number of Occurrences:   1     Standing Expiration Date:   5/20/2023    VITAMIN D, 25 HYDROXY     Standing Status:   Future     Number of Occurrences:   1     Standing Expiration Date:   1/28/5509    METABOLIC PANEL, COMPREHENSIVE     Standing Status:   Future     Number of Occurrences:   1     Standing Expiration Date:   5/20/2023    CVD REPORT    AMB POC URINALYSIS DIP STICK AUTO W/O MICRO    sertraline (ZOLOFT) 25 mg tablet     Sig: Take 1 Tablet by mouth daily. Dispense:  30 Tablet     Refill:  2     Patient Instructions          Learning About High Cholesterol  What is high cholesterol? High cholesterol means that you have too much cholesterol in your blood. Cholesterol is a type of fat. It's needed for many body functions, such as making new cells. Cholesterol is made by your body. It also comes from food you eat. Having high cholesterol can lead to the buildup of plaque in artery walls. This can increase your risk of heart attack and stroke. When your doctor talks about high cholesterol levels, your doctor is talking about your total cholesterol and LDL cholesterol (the \"bad\" cholesterol) levels. Your doctor may also speak about HDL (the \"good\" cholesterol) levels. High HDL is linked with a lower risk for coronary artery disease, heart attack, and stroke. Your cholesterol levels help your doctor find out your risk for having a heart attack or stroke. How can you help prevent high cholesterol? A heart-healthy lifestyle can help you prevent high cholesterol and lower your risk for a heart attack and stroke. · Eat heart-healthy foods. ? Eat fruits, vegetables, whole grains, beans, and other high-fiber foods. ? Eat lean proteins, such as seafood, lean meats, beans, nuts, and soy products. ? Eat healthy fats, such as canola and olive oil. ? Choose foods that are low in saturated fat. ?  Limit sodium and alcohol. ? Limit drinks and foods with added sugar. · Be active. Try to do moderate activity at least 2½ hours a week. Or try vigorous activity at least 1¼ hours a week. You may want to walk or try other activities, such as running, swimming, cycling, or playing tennis or team sports. · Stay at a healthy weight. Lose weight if you need to. · Don't smoke. If you need help quitting, talk to your doctor about stop-smoking programs and medicines. These can increase your chances of quitting for good. How is high cholesterol treated? The goal of treatment is to reduce your chances of having a heart attack or stroke. The goal is not to lower your cholesterol numbers only. · Have a heart-healthy lifestyle. This includes eating healthy foods, not smoking, losing weight, and being more active. · You may choose to take medicine. Follow-up care is a key part of your treatment and safety. Be sure to make and go to all appointments, and call your doctor if you are having problems. It's also a good idea to know your test results and keep a list of the medicines you take. Where can you learn more? Go to http://www.george.com/  Enter Q621 in the search box to learn more about \"Learning About High Cholesterol. \"  Current as of: January 10, 2022               Content Version: 13.2  © 2006-2022 [employer]. Care instructions adapted under license by [employer] (which disclaims liability or warranty for this information). If you have questions about a medical condition or this instruction, always ask your healthcare professional. [place] [address] any warranty or liability for your use of this information. High Blood Pressure: Care Instructions  Overview     It's normal for blood pressure to go up and down throughout the day. But if it stays up, you have high blood pressure. Another name for high blood pressure is hypertension.   Despite what a lot of people think, high blood pressure usually doesn't cause headaches or make you feel dizzy or lightheaded. It usually has no symptoms. But it does increase your risk of stroke, heart attack, and other problems. You and your doctor will talk about your risks of these problems based on your blood pressure. Your doctor will give you a goal for your blood pressure. Your goal will be based on your health and your age. Lifestyle changes, such as eating healthy and being active, are always important to help lower blood pressure. You might also take medicine to reach your blood pressure goal.  Follow-up care is a key part of your treatment and safety. Be sure to make and go to all appointments, and call your doctor if you are having problems. It's also a good idea to know your test results and keep a list of the medicines you take. How can you care for yourself at home? Medical treatment  · If you stop taking your medicine, your blood pressure will go back up. You may take one or more types of medicine to lower your blood pressure. Be safe with medicines. Take your medicine exactly as prescribed. Call your doctor if you think you are having a problem with your medicine. · Talk to your doctor before you start taking aspirin every day. Aspirin can help certain people lower their risk of a heart attack or stroke. But taking aspirin isn't right for everyone, because it can cause serious bleeding. · See your doctor regularly. You may need to see the doctor more often at first or until your blood pressure comes down. · If you are taking blood pressure medicine, talk to your doctor before you take decongestants or anti-inflammatory medicine, such as ibuprofen. Some of these medicines can raise blood pressure. · Learn how to check your blood pressure at home. Lifestyle changes  · Stay at a healthy weight.  This is especially important if you put on weight around the waist. Losing even 10 pounds can help you lower your blood pressure. · If your doctor recommends it, get more exercise. Walking is a good choice. Bit by bit, increase the amount you walk every day. Try for at least 30 minutes on most days of the week. You also may want to swim, bike, or do other activities. · Avoid or limit alcohol. Talk to your doctor about whether you can drink any alcohol. · Try to limit how much sodium you eat to less than 2,300 milligrams (mg) a day. Your doctor may ask you to try to eat less than 1,500 mg a day. · Eat plenty of fruits (such as bananas and oranges), vegetables, legumes, whole grains, and low-fat dairy products. · Lower the amount of saturated fat in your diet. Saturated fat is found in animal products such as milk, cheese, and meat. Limiting these foods may help you lose weight and also lower your risk for heart disease. · Do not smoke. Smoking increases your risk for heart attack and stroke. If you need help quitting, talk to your doctor about stop-smoking programs and medicines. These can increase your chances of quitting for good. When should you call for help? Call 911  anytime you think you may need emergency care. This may mean having symptoms that suggest that your blood pressure is causing a serious heart or blood vessel problem. Your blood pressure may be over 180/120. For example, call 911 if:    · You have symptoms of a heart attack. These may include:  ? Chest pain or pressure, or a strange feeling in the chest.  ? Sweating. ? Shortness of breath. ? Nausea or vomiting. ? Pain, pressure, or a strange feeling in the back, neck, jaw, or upper belly or in one or both shoulders or arms. ? Lightheadedness or sudden weakness. ? A fast or irregular heartbeat.     · You have symptoms of a stroke. These may include:  ? Sudden numbness, tingling, weakness, or loss of movement in your face, arm, or leg, especially on only one side of your body. ? Sudden vision changes. ? Sudden trouble speaking.   ? Sudden confusion or trouble understanding simple statements. ? Sudden problems with walking or balance. ? A sudden, severe headache that is different from past headaches.     · You have severe back or belly pain. Do not wait until your blood pressure comes down on its own. Get help right away. Call your doctor now or seek immediate care if:    · Your blood pressure is much higher than normal (such as 180/120 or higher), but you don't have symptoms.     · You think high blood pressure is causing symptoms, such as:  ? Severe headache.  ? Blurry vision. Watch closely for changes in your health, and be sure to contact your doctor if:    · Your blood pressure measures higher than your doctor recommends at least 2 times. That means the top number is higher or the bottom number is higher, or both.     · You think you may be having side effects from your blood pressure medicine. Where can you learn more? Go to http://www.gray.com/  Enter [id number] in the search box to learn more about \"High Blood Pressure: Care Instructions. \"  Current as of: January 10, 2022               Content Version: 13.2  © 9032-7625 [employer]. Care instructions adapted under license by [employer] (which disclaims liability or warranty for this information). If you have questions about a medical condition or this instruction, always ask your healthcare professional. [address] any warranty or liability for your use of this information. Learning About Vitamin D  Why is it important to get enough vitamin D? Your body needs vitamin D to absorb calcium. Calcium keeps your bones and muscles, including your heart, healthy and strong. If your muscles don't get enough calcium, they can cramp, hurt, or feel weak. You may have long-term (chronic) muscle aches and pains.   If you don't get enough vitamin D throughout life, you have an increased chance of having thin and brittle bones (osteoporosis) in your later years. Children who don't get enough vitamin D may not grow as much as others their age. They also have a chance of getting a rare disease called rickets. It causes weak bones. Vitamin D and calcium are added to many foods. And your body uses sunshine to make its own vitamin D. How much vitamin D do you need? The recommended dietary allowance (RDA) for vitamin D is 600 IU (international units) every day for people ages 3 through 79. Adults 71 and older need 800 IU every day. How can you get more vitamin D? Foods that contain vitamin D include:  · Melber, tuna, and mackerel. These are some of the best foods to eat when you need to get more vitamin D.  · Cheese, egg yolks, and beef liver. These foods have vitamin D in small amounts. · Milk, soy drinks, orange juice, yogurt, margarine, and some kinds of cereal have vitamin D added to them. Some people don't make vitamin D as well as others. They may have to take extra care in getting enough vitamin D. Things that reduce how much vitamin D your body makes include:  · Having dark skin. · Age, especially if you are older than 72. · Digestive problems, such as Crohn's or celiac disease. · Liver and kidney disease. Some people who do not get enough vitamin D may need supplements. Are there any risks from taking vitamin D?  · Too much vitamin D:  ? Can damage your kidneys. ? Can cause nausea and vomiting, constipation, and weakness. ? Raises the amount of calcium in your blood. If this happens, you can get confused or have an irregular heart rhythm. · Vitamin D may interact with other medicines. Tell your doctor about all of the medicines you take, including over-the-counter drugs, herbs, and pills. Tell your doctor about all of your current medical problems. Where can you learn more?   Go to http://www.gray.com/  Enter V086 in the search box to learn more about \"Learning About Vitamin LOW.\"  Current as of: September 8, 2021               Content Version: 13.2  © 6363-5534 [employer]. Care instructions adapted under license by [employer] (which disclaims liability or warranty for this information). If you have questions about a medical condition or this instruction, always ask your healthcare professional. [place] [address] any warranty or liability for your use of this information. Follow-up and Dispositions    · Return in about 2 weeks (around 6/3/2022), or if symptoms worsen or fail to improve, for Discuss Results and Care Gaps. I have reviewed with the patient details of the assessment and plan and all questions were answered. Relevent patient education was performed. The most recent lab findings were reviewed with the patient. An After Visit Summary was printed and given to the patient.

## 2022-06-08 ENCOUNTER — VIRTUAL VISIT (OUTPATIENT)
Dept: INTERNAL MEDICINE CLINIC | Age: 62
End: 2022-06-08
Payer: COMMERCIAL

## 2022-06-08 DIAGNOSIS — Z71.2 ENCOUNTER TO DISCUSS TEST RESULTS: Primary | ICD-10-CM

## 2022-06-08 DIAGNOSIS — Z12.31 ENCOUNTER FOR SCREENING MAMMOGRAM FOR MALIGNANT NEOPLASM OF BREAST: ICD-10-CM

## 2022-06-08 DIAGNOSIS — I10 ESSENTIAL HYPERTENSION: ICD-10-CM

## 2022-06-08 DIAGNOSIS — R73.03 PREDIABETES: ICD-10-CM

## 2022-06-08 DIAGNOSIS — E55.9 VITAMIN D DEFICIENCY: ICD-10-CM

## 2022-06-08 DIAGNOSIS — F32.A DEPRESSION, UNSPECIFIED DEPRESSION TYPE: ICD-10-CM

## 2022-06-08 DIAGNOSIS — E78.2 MIXED HYPERLIPIDEMIA: ICD-10-CM

## 2022-06-08 DIAGNOSIS — Z76.0 ENCOUNTER FOR MEDICATION REFILL: ICD-10-CM

## 2022-06-08 DIAGNOSIS — Z12.11 SCREEN FOR COLON CANCER: ICD-10-CM

## 2022-06-08 PROCEDURE — 99214 OFFICE O/P EST MOD 30 MIN: CPT | Performed by: NURSE PRACTITIONER

## 2022-06-08 NOTE — PROGRESS NOTES
Subjective:  Jalyn Martinez is a 65 y.o. female and presents with Hyperlipidemia, Hypertension, Vitamin D Deficiency and Depression to Discuss Results (Pre-Diabetes) and Care Gaps (Mammogram & Colonoscopy).     Hyperlipidemia Review:  Patient presents for follow up and evaluation of lipids. Compliance with treatment thus far has not been excellent, a repeat fasting lipid profile was done at today's office visit. Patient is not adhering to dietary recommendations. Patient states that she does not exercise regularly, was advised of exercise recommendations of 150 minutes a week. The patient does not use medications that may worsen dyslipidemias (corticosteroids, progestins, anabolic steroids, amiodarone, cyclosporine, olanzapine).        Hypertension Review:  The patient has essential hypertension  Diet and Lifestyle: generally follows a  low sodium diet, exercises sporadically  Home BP Monitoring: is not measured at home. Pertinent ROS: taking medications as instructed, no medication side effects noted, no TIA's, no chest pain on exertion, no dyspnea on exertion, no swelling of ankles.      Vitamin D Deficiency Review  Patient presents with Vitamin D deficiency. Patient denies symptoms of bone pain, muscle weakness, Fatigue or other neurological symptoms. Labs were ordered at today's office visit to assess the need for continued  Vitamin D replacement     Depression Review  Patient is being seen for follow-up of depression. Ongoing symptoms include depressed mood, insomnia, psychomotor agitation, psychomotor retardation, fatigue, feelings of worthlessness/guilt, difficulty concentrating and hopelessness. The patient denies recurrent thoughts of death and suicidal thoughts without plan.      Pre-Diabetes Mellitus Review:  Patient is presenting for evaluation and follow up for Pre-Diabetes Mellitus Type II.    Diabetic ROS - Diabetic diet compliance: Not compliant all of the time, home glucose monitoring: is not performed. Known diabetic complications: none  Cardiovascular risk factors: family history, dyslipidemia, diabetes mellitus, obesity, hypertension  Current diabetic medications: Not currently taking medications, trying to manage blood sugar with lifestyle changes. Eye exam current (within one year): yes  Weight trend: stable  Prior visit with dietician: no  Current diet: trying to adhere to a low sugar diet   Current exercise: none    Review of Systems  Constitutional: negative for fevers, chills, anorexia and weight loss  Eyes:               negative for visual disturbance and irritation  ENT:                RXMIUFLP for tinnitus,sore throat,nasal congestion,ear pains. hoarseness  Respiratory:     negative for cough, hemoptysis, dyspnea,wheezing  CV:                  negative for chest pain, palpitations, lower extremity edema  GI:                   negative for nausea, vomiting, diarrhea, abdominal pain,melena  Endo:               negative for polyuria,polydipsia,polyphagia,heat intolerance  Genitourinary: negative for frequency, dysuria and hematuria  Integument:     negative for rash and pruritus  Hematologic:   negative for easy bruising and gum/nose bleeding  Musculoskel:   negative for myalgias, arthralgias, back pain, muscle weakness, joint pain  Neurological:   negative for headaches, dizziness, vertigo, memory problems and gait   Behavl/Psych: positive for feelings of anxiety, depression, mood changes    Past Medical History:   Diagnosis Date    Blindness of left eye     Hypertension      Past Surgical History:   Procedure Laterality Date    HX GYN       Social History     Socioeconomic History    Marital status: UNKNOWN   Tobacco Use    Smoking status: Former Smoker     Packs/day: 3.00    Smokeless tobacco: Never Used    Tobacco comment: smoker for 17 years; 3 cigs a day   Substance and Sexual Activity    Alcohol use:  Yes     Alcohol/week: 1.0 standard drink     Types: 1 Glasses of wine per week Comment: social    Drug use: No    Sexual activity: Never     Family History   Problem Relation Age of Onset   [de-identified] Hypertension Mother     Hypertension Father     Hypertension Sister     Hypertension Brother      Current Outpatient Medications   Medication Sig Dispense Refill    sertraline (ZOLOFT) 25 mg tablet Take 1 Tablet by mouth daily. 30 Tablet 2    atorvastatin (LIPITOR) 20 mg tablet Take 1 Tablet by mouth daily. Indications: excessive fat in the blood 30 Tablet 0    lisinopril-hydroCHLOROthiazide (PRINZIDE, ZESTORETIC) 10-12.5 mg per tablet Take 1 Tablet by mouth daily. 90 Tablet 0    ergocalciferol (ERGOCALCIFEROL) 1,250 mcg (50,000 unit) capsule Take 1 Capsule by mouth every seven (7) days. 4 Capsule 0     No Known Allergies    Objective: There were no vitals taken for this visit. Physical Exam:   Lewis Leonard is a 65 y.o. female being evaluated by a Virtual Visit (telephone visit) encounter to address concerns as mentioned above. Due to this being a TeleHealth encounter (During SBESE-28 public health emergency), evaluation of the following organ systems was limited: Vitals/Constitutional/EENT/Resp/CV/GI//MS/Neuro/Skin/Heme-Lymph-Imm. Pursuant to the emergency declaration under the [address] [place] [address] authority and the [employer] Act, this Virtual Visit was conducted with patient's (and/or legal guardian's) consent, to reduce the risk of exposure to COVID-19 and provide necessary medical care. Services were provided through a telephonic discussion virtually to substitute for in-person encounter.     Results for orders placed or performed in visit on 05/20/22   HEMOGLOBIN A1C WITH EAG   Result Value Ref Range    Hemoglobin A1c 5.7 (H) 4.8 - 5.6 %    Estimated average glucose 117 mg/dL   CBC W/O DIFF   Result Value Ref Range    WBC 6.2 3.4 - 10.8 x10E3/uL    RBC 4.19 3.77 - 5.28 x10E6/uL HGB 13.0 11.1 - 15.9 g/dL    HCT 39.5 34.0 - 46.6 %    MCV 94 79 - 97 fL    MCH 31.0 26.6 - 33.0 pg    MCHC 32.9 31.5 - 35.7 g/dL    RDW 12.0 11.7 - 15.4 %    PLATELET 428 044 - 519 x10E3/uL   LIPID PANEL   Result Value Ref Range    Cholesterol, total 192 100 - 199 mg/dL    Triglyceride 108 0 - 149 mg/dL    HDL Cholesterol 63 >39 mg/dL    VLDL, calculated 19 5 - 40 mg/dL    LDL, calculated 110 (H) 0 - 99 mg/dL   VITAMIN D, 25 HYDROXY   Result Value Ref Range    VITAMIN D, 25-HYDROXY 32.9 30.0 - 564.4 ng/mL   METABOLIC PANEL, COMPREHENSIVE   Result Value Ref Range    Glucose 102 (H) 65 - 99 mg/dL    BUN 12 8 - 27 mg/dL    Creatinine 0.79 0.57 - 1.00 mg/dL    eGFR 85 >59 mL/min/1.73    BUN/Creatinine ratio 15 12 - 28    Sodium 140 134 - 144 mmol/L    Potassium 3.7 3.5 - 5.2 mmol/L    Chloride 101 96 - 106 mmol/L    CO2 24 20 - 29 mmol/L    Calcium 10.0 8.7 - 10.3 mg/dL    Protein, total 7.6 6.0 - 8.5 g/dL    Albumin 4.7 3.8 - 4.8 g/dL    GLOBULIN, TOTAL 2.9 1.5 - 4.5 g/dL    A-G Ratio 1.6 1.2 - 2.2    Bilirubin, total 0.5 0.0 - 1.2 mg/dL    Alk. phosphatase 104 44 - 121 IU/L    AST (SGOT) 12 0 - 40 IU/L    ALT (SGPT) 13 0 - 32 IU/L   CVD REPORT   Result Value Ref Range    INTERPRETATION Note        Assessment/Plan:    ICD-10-CM ICD-9-CM    1. Encounter to discuss test results  Z71.2 V65.49    2. Essential hypertension  I10 401.9    3. Mixed hyperlipidemia  E78.2 272.2    4. Prediabetes  R73.03 790.29    5. Vitamin D deficiency  E55.9 268.9    6. Depression, unspecified depression type  F32. A 311    7.  Screen for colon cancer  Z12.11 V76.51 REFERRAL FOR COLONOSCOPY   8. Encounter for screening mammogram for malignant neoplasm of breast  Z12.31 V76.12 [facility] MAMMO BI SCREENING INCL CAD     Orders Placed This Encounter    [facility] MAMMO BI SCREENING INCL CAD ([id number])     Standing Status:   Future     Standing Expiration Date:   6/8/2023     Order Specific Question:   Reason for Exam     Answer:   Screening    Referral for Colonoscopy (options for GI, Colon &  Rectal Surgery, & General Surgery)     Referral Priority:   Routine     Referral Type:   Consultation     Referral Reason:   Specialty Services Required     Referred to Provider:   Cierra Hurtado NP     Requested Specialty:   Gastroenterology     Number of Visits Requested:   1     Patient Instructions          High Cholesterol: Care Instructions  Overview     Cholesterol is a type of fat in your blood. It is needed for many body functions, such as making new cells. Cholesterol is made by your body. It also comes from food you eat. High cholesterol means that you have too much of the fat in your blood. This raises your risk of a heart attack and stroke. LDL and HDL are part of your total cholesterol. LDL is the \"bad\" cholesterol. High LDL can raise your risk for coronary artery disease, heart attack, and stroke. HDL is the \"good\" cholesterol. It helps clear bad cholesterol from the body. High HDL is linked with a lower risk of coronary artery disease, heart attack, and stroke. Your cholesterol levels help your doctor find out your risk for having a heart attack or stroke. You and your doctor can talk about whether you need to lower your risk and what treatment is best for you. Treatment options include a heart-healthy lifestyle and medicine. Both options can help lower your cholesterol and your risk. The way you choose to lower your risk will depend on how high your risk is for heart attack and stroke. It will also depend on how you feel about taking medicines. Follow-up care is a key part of your treatment and safety. Be sure to make and go to all appointments, and call your doctor if you are having problems. It's also a good idea to know your test results and keep a list of the medicines you take. How can you care for yourself at home? · Eat heart-healthy foods. ? Eat fruits, vegetables, whole grains, beans, and other high-fiber foods.   ? Eat lean proteins, such as seafood, lean meats, beans, nuts, and soy products. ? Eat healthy fats, such as canola and olive oil. ? Choose foods that are low in saturated fat. ? Limit sodium and alcohol. ? Limit drinks and foods with added sugar. · Be physically active. Try to do moderate activity at least 2½ hours a week. Or try to do vigorous activity at least 1¼ hours a week. You may want to walk or try other activities, such as running, swimming, cycling, or playing tennis or team sports. · Stay at a healthy weight or lose weight by making the changes in eating and physical activity listed above. Losing just a small amount of weight, even 5 to 10 pounds, can help reduce your risk for having a heart attack or stroke. · Do not smoke. · Manage other health problems. These include diabetes and high blood pressure. If you think you may have a problem with alcohol or drug use, talk to your doctor. · If you take medicine, take it exactly as prescribed. Call your doctor if you think you are having a problem with your medicine. · Check with your doctor or pharmacist before you use any other medicines, including over-the-counter medicines. Make sure your doctor knows all of the medicines, vitamins, herbal products, and supplements you take. Taking some medicines together can cause problems. When should you call for help? Watch closely for changes in your health, and be sure to contact your doctor if:    · You need help making lifestyle changes.     · You have questions about your medicine. Where can you learn more? Go to http://www.gray.com/  Enter H964 in the search box to learn more about \"High Cholesterol: Care Instructions. \"  Current as of: January 10, 2022               Content Version: 13.2  © 2294-7183 [employer]. Care instructions adapted under license by [employer] (which disclaims liability or warranty for this information).  If you have questions about a medical condition or this instruction, always ask your healthcare professional. [address] any warranty or liability for your use of this information. High Blood Pressure: Care Instructions  Overview     It's normal for blood pressure to go up and down throughout the day. But if it stays up, you have high blood pressure. Another name for high blood pressure is hypertension. Despite what a lot of people think, high blood pressure usually doesn't cause headaches or make you feel dizzy or lightheaded. It usually has no symptoms. But it does increase your risk of stroke, heart attack, and other problems. You and your doctor will talk about your risks of these problems based on your blood pressure. Your doctor will give you a goal for your blood pressure. Your goal will be based on your health and your age. Lifestyle changes, such as eating healthy and being active, are always important to help lower blood pressure. You might also take medicine to reach your blood pressure goal.  Follow-up care is a key part of your treatment and safety. Be sure to make and go to all appointments, and call your doctor if you are having problems. It's also a good idea to know your test results and keep a list of the medicines you take. How can you care for yourself at home? Medical treatment  · If you stop taking your medicine, your blood pressure will go back up. You may take one or more types of medicine to lower your blood pressure. Be safe with medicines. Take your medicine exactly as prescribed. Call your doctor if you think you are having a problem with your medicine. · Talk to your doctor before you start taking aspirin every day. Aspirin can help certain people lower their risk of a heart attack or stroke. But taking aspirin isn't right for everyone, because it can cause serious bleeding. · See your doctor regularly.  You may need to see the doctor more often at first or until your blood pressure comes down.  · If you are taking blood pressure medicine, talk to your doctor before you take decongestants or anti-inflammatory medicine, such as ibuprofen. Some of these medicines can raise blood pressure. · Learn how to check your blood pressure at home. Lifestyle changes  · Stay at a healthy weight. This is especially important if you put on weight around the waist. Losing even 10 pounds can help you lower your blood pressure. · If your doctor recommends it, get more exercise. Walking is a good choice. Bit by bit, increase the amount you walk every day. Try for at least 30 minutes on most days of the week. You also may want to swim, bike, or do other activities. · Avoid or limit alcohol. Talk to your doctor about whether you can drink any alcohol. · Try to limit how much sodium you eat to less than 2,300 milligrams (mg) a day. Your doctor may ask you to try to eat less than 1,500 mg a day. · Eat plenty of fruits (such as bananas and oranges), vegetables, legumes, whole grains, and low-fat dairy products. · Lower the amount of saturated fat in your diet. Saturated fat is found in animal products such as milk, cheese, and meat. Limiting these foods may help you lose weight and also lower your risk for heart disease. · Do not smoke. Smoking increases your risk for heart attack and stroke. If you need help quitting, talk to your doctor about stop-smoking programs and medicines. These can increase your chances of quitting for good. When should you call for help? Call 911  anytime you think you may need emergency care. This may mean having symptoms that suggest that your blood pressure is causing a serious heart or blood vessel problem. Your blood pressure may be over 180/120. For example, call 911 if:    · You have symptoms of a heart attack. These may include:  ? Chest pain or pressure, or a strange feeling in the chest.  ? Sweating. ? Shortness of breath. ? Nausea or vomiting.   ? Pain, pressure, or a strange feeling in the back, neck, jaw, or upper belly or in one or both shoulders or arms. ? Lightheadedness or sudden weakness. ? A fast or irregular heartbeat.     · You have symptoms of a stroke. These may include:  ? Sudden numbness, tingling, weakness, or loss of movement in your face, arm, or leg, especially on only one side of your body. ? Sudden vision changes. ? Sudden trouble speaking. ? Sudden confusion or trouble understanding simple statements. ? Sudden problems with walking or balance. ? A sudden, severe headache that is different from past headaches.     · You have severe back or belly pain. Do not wait until your blood pressure comes down on its own. Get help right away. Call your doctor now or seek immediate care if:    · Your blood pressure is much higher than normal (such as 180/120 or higher), but you don't have symptoms.     · You think high blood pressure is causing symptoms, such as:  ? Severe headache.  ? Blurry vision. Watch closely for changes in your health, and be sure to contact your doctor if:    · Your blood pressure measures higher than your doctor recommends at least 2 times. That means the top number is higher or the bottom number is higher, or both.     · You think you may be having side effects from your blood pressure medicine. Where can you learn more? Go to http://www.OmniVec/  Enter [id number] in the search box to learn more about \"High Blood Pressure: Care Instructions. \"  Current as of: January 10, 2022               Content Version: 13.2  © 2006-2022 Healthwise[employer]. Care instructions adapted under license by [employer] (which disclaims liability or warranty for this information). If you have questions about a medical condition or this instruction, always ask your healthcare professional. [place] [address] any warranty or liability for your use of this information.          Learning About Vitamin D  Why is it important to get enough vitamin D? Your body needs vitamin D to absorb calcium. Calcium keeps your bones and muscles, including your heart, healthy and strong. If your muscles don't get enough calcium, they can cramp, hurt, or feel weak. You may have long-term (chronic) muscle aches and pains. If you don't get enough vitamin D throughout life, you have an increased chance of having thin and brittle bones (osteoporosis) in your later years. Children who don't get enough vitamin D may not grow as much as others their age. They also have a chance of getting a rare disease called rickets. It causes weak bones. Vitamin D and calcium are added to many foods. And your body uses sunshine to make its own vitamin D. How much vitamin D do you need? The recommended dietary allowance (RDA) for vitamin D is 600 IU (international units) every day for people ages 3 through 79. Adults 71 and older need 800 IU every day. How can you get more vitamin D? Foods that contain vitamin D include:  · Tarkio, tuna, and mackerel. These are some of the best foods to eat when you need to get more vitamin D.  · Cheese, egg yolks, and beef liver. These foods have vitamin D in small amounts. · Milk, soy drinks, orange juice, yogurt, margarine, and some kinds of cereal have vitamin D added to them. Some people don't make vitamin D as well as others. They may have to take extra care in getting enough vitamin D. Things that reduce how much vitamin D your body makes include:  · Having dark skin. · Age, especially if you are older than 72. · Digestive problems, such as Crohn's or celiac disease. · Liver and kidney disease. Some people who do not get enough vitamin D may need supplements. Are there any risks from taking vitamin D?  · Too much vitamin D:  ? Can damage your kidneys. ? Can cause nausea and vomiting, constipation, and weakness. ? Raises the amount of calcium in your blood.  If this happens, you can get confused or have an irregular heart rhythm. · Vitamin D may interact with other medicines. Tell your doctor about all of the medicines you take, including over-the-counter drugs, herbs, and pills. Tell your doctor about all of your current medical problems. Where can you learn more? Go to http://www.george.com/  Enter J830 in the search box to learn more about \"Learning About Vitamin D.\"  Current as of: September 8, 2021               Content Version: 13.2  © 2006-2022 [employer]. Care instructions adapted under license by [employer] (which disclaims liability or warranty for this information). If you have questions about a medical condition or this instruction, always ask your healthcare professional. [address] any warranty or liability for your use of this information. Prediabetes: Care Instructions  Overview     Prediabetes is a warning sign that you're at risk for getting type 2 diabetes. It means that your blood sugar is higher than it should be. But it's not high enough to be diabetes. The food you eat naturally turns into sugar. Your body uses the sugar for energy. Normally, an organ called the pancreas makes insulin. And insulin allows the sugar in your blood to get into your body's cells. But sometimes the body can't use insulin the right way. So the sugar stays in your blood instead. This is called insulin resistance. The buildup of sugar in your blood means you have prediabetes. The good news is that you may be able to prevent or delay diabetes. Making small lifestyle changes, like getting active and changing your eating habits, may help you get your blood sugar back to normal. You can work with your doctor to make a treatment plan. Follow-up care is a key part of your treatment and safety. Be sure to make and go to all appointments, and call your doctor if you are having problems.  It's also a good idea to know your test results and keep a list of the medicines you take. How can you care for yourself at home? · Watch your weight. A healthy weight helps your body use insulin properly. · Limit the amount of calories, sweets, and unhealthy fat you eat. Ask your doctor if you should see a dietitian. A registered dietitian can help you create meal plans that fit your lifestyle. · Get at least 30 minutes of exercise on most days of the week. Exercise helps control your blood sugar. It also helps you maintain a healthy weight. Walking is a good choice. You also may want to do other activities, such as running, swimming, cycling, or playing tennis or team sports. · Do not smoke. Smoking can make prediabetes worse. If you need help quitting, talk to your doctor about stop-smoking programs and medicines. These can increase your chances of quitting for good. · If your doctor prescribed medicines, take them exactly as prescribed. Call your doctor if you think you are having a problem with your medicine. You will get more details on the specific medicines your doctor prescribes. When should you call for help? Watch closely for changes in your health, and be sure to contact your doctor if:    · You have any symptoms of diabetes. These may include:  ? Being thirsty more often. ? Urinating more. ? Being hungrier. ? Losing weight. ? Being very tired. ? Having blurry vision.     · You have a wound that will not heal.     · You have an infection that will not go away.     · You have problems with your blood pressure.     · You want more information about diabetes and how you can keep from getting it. Where can you learn more? Go to http://www.gray.com/  Enter I222 in the search box to learn more about \"Prediabetes: Care Instructions. \"  Current as of: July 28, 2021               Content Version: 13.2  © 4300-1554 Healthwise, Incorporated.    Care instructions adapted under license by [employer] (which disclaims liability or warranty for this information). If you have questions about a medical condition or this instruction, always ask your healthcare professional. [place] [address] any warranty or liability for your use of this information. Follow-up and Dispositions    · Return in about 3 months (around 9/8/2022), or if symptoms worsen or fail to improve, for 3 month F/U and Labs. I have reviewed with the patient details of the assessment and plan and all questions were answered. Relevent patient education was performed. The most recent lab findings were reviewed with the patient. An After Visit Summary was printed and given to the patient.

## 2022-06-08 NOTE — PATIENT INSTRUCTIONS
High Cholesterol: Care Instructions  Overview     Cholesterol is a type of fat in your blood. It is needed for many body functions, such as making new cells. Cholesterol is made by your body. It also comes from food you eat. High cholesterol means that you have too much of the fat in your blood. This raises your risk of a heart attack and stroke. LDL and HDL are part of your total cholesterol. LDL is the \"bad\" cholesterol. High LDL can raise your risk for coronary artery disease, heart attack, and stroke. HDL is the \"good\" cholesterol. It helps clear bad cholesterol from the body. High HDL is linked with a lower risk of coronary artery disease, heart attack, and stroke. Your cholesterol levels help your doctor find out your risk for having a heart attack or stroke. You and your doctor can talk about whether you need to lower your risk and what treatment is best for you. Treatment options include a heart-healthy lifestyle and medicine. Both options can help lower your cholesterol and your risk. The way you choose to lower your risk will depend on how high your risk is for heart attack and stroke. It will also depend on how you feel about taking medicines. Follow-up care is a key part of your treatment and safety. Be sure to make and go to all appointments, and call your doctor if you are having problems. It's also a good idea to know your test results and keep a list of the medicines you take. How can you care for yourself at home? · Eat heart-healthy foods. ? Eat fruits, vegetables, whole grains, beans, and other high-fiber foods. ? Eat lean proteins, such as seafood, lean meats, beans, nuts, and soy products. ? Eat healthy fats, such as canola and olive oil. ? Choose foods that are low in saturated fat. ? Limit sodium and alcohol. ? Limit drinks and foods with added sugar. · Be physically active. Try to do moderate activity at least 2½ hours a week.  Or try to do vigorous activity at least 1¼ hours a week. You may want to walk or try other activities, such as running, swimming, cycling, or playing tennis or team sports. · Stay at a healthy weight or lose weight by making the changes in eating and physical activity listed above. Losing just a small amount of weight, even 5 to 10 pounds, can help reduce your risk for having a heart attack or stroke. · Do not smoke. · Manage other health problems. These include diabetes and high blood pressure. If you think you may have a problem with alcohol or drug use, talk to your doctor. · If you take medicine, take it exactly as prescribed. Call your doctor if you think you are having a problem with your medicine. · Check with your doctor or pharmacist before you use any other medicines, including over-the-counter medicines. Make sure your doctor knows all of the medicines, vitamins, herbal products, and supplements you take. Taking some medicines together can cause problems. When should you call for help? Watch closely for changes in your health, and be sure to contact your doctor if:    · You need help making lifestyle changes.     · You have questions about your medicine. Where can you learn more? Go to http://www.gray.com/  Enter L973 in the search box to learn more about \"High Cholesterol: Care Instructions. \"  Current as of: January 10, 2022               Content Version: 13.2  © 2006-2022 [employer]. Care instructions adapted under license by [employer] (which disclaims liability or warranty for this information). If you have questions about a medical condition or this instruction, always ask your healthcare professional. [place] [address] any warranty or liability for your use of this information. High Blood Pressure: Care Instructions  Overview     It's normal for blood pressure to go up and down throughout the day. But if it stays up, you have high blood pressure.  Another name for high blood pressure is hypertension. Despite what a lot of people think, high blood pressure usually doesn't cause headaches or make you feel dizzy or lightheaded. It usually has no symptoms. But it does increase your risk of stroke, heart attack, and other problems. You and your doctor will talk about your risks of these problems based on your blood pressure. Your doctor will give you a goal for your blood pressure. Your goal will be based on your health and your age. Lifestyle changes, such as eating healthy and being active, are always important to help lower blood pressure. You might also take medicine to reach your blood pressure goal.  Follow-up care is a key part of your treatment and safety. Be sure to make and go to all appointments, and call your doctor if you are having problems. It's also a good idea to know your test results and keep a list of the medicines you take. How can you care for yourself at home? Medical treatment  · If you stop taking your medicine, your blood pressure will go back up. You may take one or more types of medicine to lower your blood pressure. Be safe with medicines. Take your medicine exactly as prescribed. Call your doctor if you think you are having a problem with your medicine. · Talk to your doctor before you start taking aspirin every day. Aspirin can help certain people lower their risk of a heart attack or stroke. But taking aspirin isn't right for everyone, because it can cause serious bleeding. · See your doctor regularly. You may need to see the doctor more often at first or until your blood pressure comes down. · If you are taking blood pressure medicine, talk to your doctor before you take decongestants or anti-inflammatory medicine, such as ibuprofen. Some of these medicines can raise blood pressure. · Learn how to check your blood pressure at home. Lifestyle changes  · Stay at a healthy weight.  This is especially important if you put on weight around the waist. Losing even 10 pounds can help you lower your blood pressure. · If your doctor recommends it, get more exercise. Walking is a good choice. Bit by bit, increase the amount you walk every day. Try for at least 30 minutes on most days of the week. You also may want to swim, bike, or do other activities. · Avoid or limit alcohol. Talk to your doctor about whether you can drink any alcohol. · Try to limit how much sodium you eat to less than 2,300 milligrams (mg) a day. Your doctor may ask you to try to eat less than 1,500 mg a day. · Eat plenty of fruits (such as bananas and oranges), vegetables, legumes, whole grains, and low-fat dairy products. · Lower the amount of saturated fat in your diet. Saturated fat is found in animal products such as milk, cheese, and meat. Limiting these foods may help you lose weight and also lower your risk for heart disease. · Do not smoke. Smoking increases your risk for heart attack and stroke. If you need help quitting, talk to your doctor about stop-smoking programs and medicines. These can increase your chances of quitting for good. When should you call for help? Call 911  anytime you think you may need emergency care. This may mean having symptoms that suggest that your blood pressure is causing a serious heart or blood vessel problem. Your blood pressure may be over 180/120. For example, call 911 if:    · You have symptoms of a heart attack. These may include:  ? Chest pain or pressure, or a strange feeling in the chest.  ? Sweating. ? Shortness of breath. ? Nausea or vomiting. ? Pain, pressure, or a strange feeling in the back, neck, jaw, or upper belly or in one or both shoulders or arms. ? Lightheadedness or sudden weakness. ? A fast or irregular heartbeat.     · You have symptoms of a stroke. These may include:  ? Sudden numbness, tingling, weakness, or loss of movement in your face, arm, or leg, especially on only one side of your body.   ? Sudden vision changes. ? Sudden trouble speaking. ? Sudden confusion or trouble understanding simple statements. ? Sudden problems with walking or balance. ? A sudden, severe headache that is different from past headaches.     · You have severe back or belly pain. Do not wait until your blood pressure comes down on its own. Get help right away. Call your doctor now or seek immediate care if:    · Your blood pressure is much higher than normal (such as 180/120 or higher), but you don't have symptoms.     · You think high blood pressure is causing symptoms, such as:  ? Severe headache.  ? Blurry vision. Watch closely for changes in your health, and be sure to contact your doctor if:    · Your blood pressure measures higher than your doctor recommends at least 2 times. That means the top number is higher or the bottom number is higher, or both.     · You think you may be having side effects from your blood pressure medicine. Where can you learn more? Go to http://www.gray.com/  Enter [id number] in the search box to learn more about \"High Blood Pressure: Care Instructions. \"  Current as of: January 10, 2022               Content Version: 13.2  © 8320-9157 [employer]. Care instructions adapted under license by [employer] (which disclaims liability or warranty for this information). If you have questions about a medical condition or this instruction, always ask your healthcare professional. [address] any warranty or liability for your use of this information. Learning About Vitamin D  Why is it important to get enough vitamin D? Your body needs vitamin D to absorb calcium. Calcium keeps your bones and muscles, including your heart, healthy and strong. If your muscles don't get enough calcium, they can cramp, hurt, or feel weak. You may have long-term (chronic) muscle aches and pains.   If you don't get enough vitamin D throughout life, you have an increased chance of having thin and brittle bones (osteoporosis) in your later years. Children who don't get enough vitamin D may not grow as much as others their age. They also have a chance of getting a rare disease called rickets. It causes weak bones. Vitamin D and calcium are added to many foods. And your body uses sunshine to make its own vitamin D. How much vitamin D do you need? The recommended dietary allowance (RDA) for vitamin D is 600 IU (international units) every day for people ages 3 through 79. Adults 71 and older need 800 IU every day. How can you get more vitamin D? Foods that contain vitamin D include:  · Dyess, tuna, and mackerel. These are some of the best foods to eat when you need to get more vitamin D.  · Cheese, egg yolks, and beef liver. These foods have vitamin D in small amounts. · Milk, soy drinks, orange juice, yogurt, margarine, and some kinds of cereal have vitamin D added to them. Some people don't make vitamin D as well as others. They may have to take extra care in getting enough vitamin D. Things that reduce how much vitamin D your body makes include:  · Having dark skin. · Age, especially if you are older than 72. · Digestive problems, such as Crohn's or celiac disease. · Liver and kidney disease. Some people who do not get enough vitamin D may need supplements. Are there any risks from taking vitamin D?  · Too much vitamin D:  ? Can damage your kidneys. ? Can cause nausea and vomiting, constipation, and weakness. ? Raises the amount of calcium in your blood. If this happens, you can get confused or have an irregular heart rhythm. · Vitamin D may interact with other medicines. Tell your doctor about all of the medicines you take, including over-the-counter drugs, herbs, and pills. Tell your doctor about all of your current medical problems. Where can you learn more?   Go to http://www.gray.com/  Enter K589 in the search box to learn more about \"Learning About Vitamin D.\"  Current as of: September 8, 2021               Content Version: 13.2  © 2006-2022 [employer]. Care instructions adapted under license by [employer] (which disclaims liability or warranty for this information). If you have questions about a medical condition or this instruction, always ask your healthcare professional. [address] any warranty or liability for your use of this information. Prediabetes: Care Instructions  Overview     Prediabetes is a warning sign that you're at risk for getting type 2 diabetes. It means that your blood sugar is higher than it should be. But it's not high enough to be diabetes. The food you eat naturally turns into sugar. Your body uses the sugar for energy. Normally, an organ called the pancreas makes insulin. And insulin allows the sugar in your blood to get into your body's cells. But sometimes the body can't use insulin the right way. So the sugar stays in your blood instead. This is called insulin resistance. The buildup of sugar in your blood means you have prediabetes. The good news is that you may be able to prevent or delay diabetes. Making small lifestyle changes, like getting active and changing your eating habits, may help you get your blood sugar back to normal. You can work with your doctor to make a treatment plan. Follow-up care is a key part of your treatment and safety. Be sure to make and go to all appointments, and call your doctor if you are having problems. It's also a good idea to know your test results and keep a list of the medicines you take. How can you care for yourself at home? · Watch your weight. A healthy weight helps your body use insulin properly. · Limit the amount of calories, sweets, and unhealthy fat you eat. Ask your doctor if you should see a dietitian.  A registered dietitian can help you create meal plans that fit your lifestyle. · Get at least 30 minutes of exercise on most days of the week. Exercise helps control your blood sugar. It also helps you maintain a healthy weight. Walking is a good choice. You also may want to do other activities, such as running, swimming, cycling, or playing tennis or team sports. · Do not smoke. Smoking can make prediabetes worse. If you need help quitting, talk to your doctor about stop-smoking programs and medicines. These can increase your chances of quitting for good. · If your doctor prescribed medicines, take them exactly as prescribed. Call your doctor if you think you are having a problem with your medicine. You will get more details on the specific medicines your doctor prescribes. When should you call for help? Watch closely for changes in your health, and be sure to contact your doctor if:    · You have any symptoms of diabetes. These may include:  ? Being thirsty more often. ? Urinating more. ? Being hungrier. ? Losing weight. ? Being very tired. ? Having blurry vision.     · You have a wound that will not heal.     · You have an infection that will not go away.     · You have problems with your blood pressure.     · You want more information about diabetes and how you can keep from getting it. Where can you learn more? Go to http://www.gray.com/  Enter I222 in the search box to learn more about \"Prediabetes: Care Instructions. \"  Current as of: July 28, 2021               Content Version: 13.2  © 6071-5653 [employer]. Care instructions adapted under license by [employer] (which disclaims liability or warranty for this information). If you have questions about a medical condition or this instruction, always ask your healthcare professional. [address] any warranty or liability for your use of this information.

## 2022-06-08 NOTE — PROGRESS NOTES
Chief Complaint   Patient presents with    Results     1. Have you been to the ER, urgent care clinic since your last visit? Hospitalized since your last visit? No    2. Have you seen or consulted any other health care providers outside of the [address] [place] [address] since your last visit? Include any pap smears or colon screening.  No

## 2022-06-08 NOTE — TELEPHONE ENCOUNTER
Last visit 06/08/2022 Virtual visit JENNY [place]   Next appointment 3 months (09/2022)   Previous refill encounter(s)   05/04/2022 Vitamin D2 #4     For Pharmacy Admin Tracking Only     Intervention Detail: New Rx: 1, reason: Patient Preference   Time Spent (min): 5        Requested Prescriptions     Pending Prescriptions Disp Refills    ergocalciferol (ERGOCALCIFEROL) 1,250 mcg (50,000 unit) capsule 4 Capsule 0     Sig: Take 1 Capsule by mouth every seven (7) days.

## 2022-06-10 RX ORDER — ERGOCALCIFEROL 1.25 MG/1
50000 CAPSULE ORAL
Qty: 4 CAPSULE | Refills: 2 | Status: SHIPPED | OUTPATIENT
Start: 2022-06-10 | End: 2022-09-26 | Stop reason: SDUPTHER

## 2022-07-26 DIAGNOSIS — F32.A DEPRESSION, UNSPECIFIED DEPRESSION TYPE: ICD-10-CM

## 2022-07-26 DIAGNOSIS — Z63.4 GRIEF AT LOSS OF CHILD: ICD-10-CM

## 2022-07-26 DIAGNOSIS — E78.2 MIXED HYPERLIPIDEMIA: ICD-10-CM

## 2022-07-26 DIAGNOSIS — F43.21 GRIEF AT LOSS OF CHILD: ICD-10-CM

## 2022-07-26 DIAGNOSIS — Z76.0 ENCOUNTER FOR MEDICATION REFILL: ICD-10-CM

## 2022-07-26 DIAGNOSIS — I10 ESSENTIAL HYPERTENSION: ICD-10-CM

## 2022-07-26 RX ORDER — SERTRALINE HYDROCHLORIDE 25 MG/1
25 TABLET, FILM COATED ORAL DAILY
Qty: 90 TABLET | Refills: 0 | Status: SHIPPED | OUTPATIENT
Start: 2022-07-26 | End: 2022-10-26

## 2022-07-26 RX ORDER — LISINOPRIL AND HYDROCHLOROTHIAZIDE 10; 12.5 MG/1; MG/1
1 TABLET ORAL DAILY
Qty: 90 TABLET | Refills: 0 | Status: SHIPPED | OUTPATIENT
Start: 2022-07-26

## 2022-07-26 RX ORDER — ATORVASTATIN CALCIUM 20 MG/1
20 TABLET, FILM COATED ORAL DAILY
Qty: 90 TABLET | Refills: 0 | Status: SHIPPED | OUTPATIENT
Start: 2022-07-26

## 2022-07-26 SDOH — SOCIAL STABILITY - SOCIAL INSECURITY: DISSAPEARANCE AND DEATH OF FAMILY MEMBER: Z63.4

## 2022-07-26 NOTE — TELEPHONE ENCOUNTER
Last visit 06/08/2022 Virtual visit NP Kayli   Next appointment Nothing scheduled   Previous refill encounter(s)   05/17/2022 Prinzide #90,  05/19/2022 Lipitor #30,   05/20/2022 Zoloft #30 with 2 refills. For Pharmacy Admin Tracking Only    Intervention Detail: New Rx: 3, reason: Patient Preference  Time Spent (min): 5       Requested Prescriptions     Pending Prescriptions Disp Refills    atorvastatin (LIPITOR) 20 mg tablet 90 Tablet 0     Sig: Take 1 Tablet by mouth in the morning. Indications: excessive fat in the blood    lisinopril-hydroCHLOROthiazide (PRINZIDE, ZESTORETIC) 10-12.5 mg per tablet 90 Tablet 0     Sig: Take 1 Tablet by mouth in the morning. sertraline (ZOLOFT) 25 mg tablet 90 Tablet 0     Sig: Take 1 Tablet by mouth in the morning.

## 2022-09-26 DIAGNOSIS — Z76.0 ENCOUNTER FOR MEDICATION REFILL: ICD-10-CM

## 2022-09-26 DIAGNOSIS — I10 ESSENTIAL HYPERTENSION: ICD-10-CM

## 2022-09-26 DIAGNOSIS — E78.2 MIXED HYPERLIPIDEMIA: ICD-10-CM

## 2022-09-26 DIAGNOSIS — E55.9 VITAMIN D DEFICIENCY: ICD-10-CM

## 2022-09-26 RX ORDER — ERGOCALCIFEROL 1.25 MG/1
50000 CAPSULE ORAL
Qty: 12 CAPSULE | Refills: 0 | Status: SHIPPED | OUTPATIENT
Start: 2022-09-26

## 2022-09-26 NOTE — TELEPHONE ENCOUNTER
Last visit 06/08/2022 Virtual visit JENNY [place]  Next appointment Nothing scheduled   Previous refill encounter(s)   06/10/2022 Vitamin D #4 with 2 refills. For Pharmacy Admin Tracking Only    Intervention Detail: New Rx: 1, reason: Patient Preference  Time Spent (min): 5      Requested Prescriptions     Pending Prescriptions Disp Refills    ergocalciferol (ERGOCALCIFEROL) 1,250 mcg (50,000 unit) capsule 12 Capsule 0     Sig: Take 1 Capsule by mouth every seven (7) days.

## 2023-01-05 ENCOUNTER — OFFICE VISIT (OUTPATIENT)
Dept: INTERNAL MEDICINE CLINIC | Age: 63
End: 2023-01-05
Payer: COMMERCIAL

## 2023-01-05 VITALS
WEIGHT: 211 LBS | OXYGEN SATURATION: 99 % | BODY MASS INDEX: 33.12 KG/M2 | TEMPERATURE: 98 F | HEIGHT: 67 IN | SYSTOLIC BLOOD PRESSURE: 150 MMHG | RESPIRATION RATE: 16 BRPM | DIASTOLIC BLOOD PRESSURE: 84 MMHG | HEART RATE: 88 BPM

## 2023-01-05 DIAGNOSIS — E55.9 VITAMIN D DEFICIENCY: ICD-10-CM

## 2023-01-05 DIAGNOSIS — I10 ESSENTIAL HYPERTENSION: ICD-10-CM

## 2023-01-05 DIAGNOSIS — F32.A ANXIETY AND DEPRESSION: ICD-10-CM

## 2023-01-05 DIAGNOSIS — F41.9 ANXIETY AND DEPRESSION: ICD-10-CM

## 2023-01-05 DIAGNOSIS — Z76.0 ENCOUNTER FOR MEDICATION REFILL: Primary | ICD-10-CM

## 2023-01-05 DIAGNOSIS — Z87.898 HISTORY OF PREDIABETES: ICD-10-CM

## 2023-01-05 DIAGNOSIS — E78.2 MIXED HYPERLIPIDEMIA: ICD-10-CM

## 2023-01-05 PROCEDURE — 99214 OFFICE O/P EST MOD 30 MIN: CPT

## 2023-01-05 RX ORDER — ERGOCALCIFEROL 1.25 MG/1
50000 CAPSULE ORAL
Qty: 12 CAPSULE | Refills: 0 | Status: SHIPPED | OUTPATIENT
Start: 2023-01-05

## 2023-01-05 RX ORDER — LISINOPRIL AND HYDROCHLOROTHIAZIDE 10; 12.5 MG/1; MG/1
1 TABLET ORAL DAILY
Qty: 90 TABLET | Refills: 0 | Status: SHIPPED | OUTPATIENT
Start: 2023-01-05

## 2023-01-05 RX ORDER — SERTRALINE HYDROCHLORIDE 50 MG/1
50 TABLET, FILM COATED ORAL DAILY
Qty: 30 TABLET | Refills: 2 | Status: SHIPPED | OUTPATIENT
Start: 2023-01-05

## 2023-01-05 RX ORDER — ATORVASTATIN CALCIUM 20 MG/1
20 TABLET, FILM COATED ORAL DAILY
Qty: 90 TABLET | Refills: 0 | Status: SHIPPED | OUTPATIENT
Start: 2023-01-05

## 2023-01-05 NOTE — PROGRESS NOTES
1. Have you been to the ER, urgent care clinic since your last visit? Hospitalized since your last visit?no    2. Have you seen or consulted any other health care providers outside of the [address] [place] [address] since your last visit? Include any pap smears or colon screening.  No    Chief Complaint   Patient presents with    Establish Care    Cholesterol Problem    Hypertension    Vitamin D Deficiency

## 2023-01-05 NOTE — PATIENT INSTRUCTIONS
Stop Zoloft 25 mg and start 50 mg Zoloft daily. Any ideation of hurting yourself of someone, call 911 immediately.

## 2023-01-05 NOTE — PROGRESS NOTES
Nimisha Nuñez is a 65 y.o. female , established patient, here for evaluation of the following chief complaint(s): Establish Care, Cholesterol Problem, Hypertension (Ran out of htn meds two days ago), and Vitamin D Deficiency     Subjective:    Cardiovascular Review:  The patient has hypertension and hyperlipidemia. Diet and Lifestyle: generally tries to follow a low fat low cholesterol and low sodium diet, doesn't exercise regularly nonsmoker   Home BP Monitoring: is not measured at home. States that the machine is messed up and needs a new one  Pertinent ROS: Was taking medications as instructed but ran out 2 days ago, no medication side effects noted, no TIA's, no chest pain on exertion, no dyspnea on exertion, no swelling of ankles. Prediabetes Review:  Nimisha Nuñez has pre-diabetes  Diabetic ROS -negative for polyuria,polydipsia,polyphagia, and heat intolerance  Medication compliance: n/a   Home glucose monitoring: n/a   Weight: stable    Vitamin D Deficiency Review:  Patient has a history of vit D deficiency, taking medicine as prescribed. Patient denies symptoms of bone pain, muscle weakness, fatigue or other neurological symptoms. Depression and Anxiety Review:  Patient is taking 25 mg Zoloft but not daily, ongoing symptoms of anxiety and depression but no problems with sleep,denies recurrent thoughts of death and suicidal thoughts. ALISSA-7 and PHQ 9 reviewed. Review of Systems  Constitutional: negative for fevers, chills, anorexia and weight loss  Eyes:   negative for visual disturbance and irritation  ENT:   negative for tinnitus,sore throat,nasal congestion,ear pains. hoarseness  Respiratory:  negative for cough, hemoptysis, dyspnea,wheezing  CV:   negative for chest pain, palpitations, lower extremity edema  GI:   negative for nausea, vomiting, diarrhea, abdominal pain,melena  Endo:               negative for polyuria,polydipsia,polyphagia,heat intolerance  Genitourinary: negative for frequency, dysuria and hematuria  Integument:  negative for rash and pruritus  Hematologic:  negative for easy bruising and gum/nose bleeding  Musculoskel: negative for myalgias, arthralgias, back pain, muscle weakness, joint pain  Neurological:  negative for headaches, dizziness, vertigo, memory problems and gait     Past Medical History:   Diagnosis Date    Blindness of left eye     Hypertension      Past Surgical History:   Procedure Laterality Date    HX GYN       Social History     Socioeconomic History    Marital status: SINGLE   Tobacco Use    Smoking status: Former     Packs/day: 3.00     Types: Cigarettes    Smokeless tobacco: Never    Tobacco comments:     smoker for 17 years; 3 cigs a day   Substance and Sexual Activity    Alcohol use: Yes     Alcohol/week: 1.0 standard drink     Types: 1 Glasses of wine per week     Comment: social    Drug use: No    Sexual activity: Never     Family History   Problem Relation Age of Onset    Hypertension Mother     Hypertension Father     Hypertension Sister     Hypertension Brother      Current Outpatient Medications   Medication Sig Dispense Refill    ergocalciferol (ERGOCALCIFEROL) 1,250 mcg (50,000 unit) capsule Take 1 Capsule by mouth every seven (7) days. 12 Capsule 0    atorvastatin (LIPITOR) 20 mg tablet Take 1 Tablet by mouth daily. Indications: excessive fat in the blood 90 Tablet 0    lisinopril-hydroCHLOROthiazide (PRINZIDE, ZESTORETIC) 10-12.5 mg per tablet Take 1 Tablet by mouth daily. 90 Tablet 0    sertraline (ZOLOFT) 50 mg tablet Take 1 Tablet by mouth daily.  30 Tablet 2     No Known Allergies    Objective:  Visit Vitals  BP (!) 150/84   Pulse 88   Temp 98 °F (36.7 °C) (Oral)   Resp 16   Ht 5' 7\" (1.702 m)   Wt 211 lb (95.7 kg)   SpO2 99%   BMI 33.05 kg/m²     BP Readings from Last 3 Encounters:   01/05/23 (!) 150/84   05/20/22 114/82   11/05/21 139/80      Lab Results   Component Value Date/Time    Hemoglobin A1c 5.7 (H) 05/20/2022 09:57 AM Hemoglobin A1c 5.5 11/05/2021 12:38 PM    Hemoglobin A1c 5.6 05/02/2018 11:56 AM     Last 3 Recorded Weights in this Encounter    01/05/23 1103   Weight: 211 lb (95.7 kg)     ALISSA 2/7 1/5/2023   Feeling nervous, anxious, or on edge 1   Not being able to stop or control worrying 1   Worrying too much about different things 1   Trouble relaxing 1   Being so restless that it is hard to sit still 1   Becoming easily annoyed or irritable 1   Feeling afraid as if something awful might happen 2   ALISSA-7 Total Score 8     3 most recent PHQ Screens 1/5/2023   Little interest or pleasure in doing things Several days   Feeling down, depressed, irritable, or hopeless More than half the days   Total Score PHQ 2 3   Trouble falling or staying asleep, or sleeping too much Not at all   Feeling tired or having little energy Several days   Poor appetite, weight loss, or overeating Several days   Feeling bad about yourself - or that you are a failure or have let yourself or your family down Not at all   Trouble concentrating on things such as school, work, reading, or watching TV Several days   Moving or speaking so slowly that other people could have noticed; or the opposite being so fidgety that others notice Not at all   Thoughts of being better off dead, or hurting yourself in some way Not at all   PHQ 9 Score 6   How difficult have these problems made it for you to do your work, take care of your home and get along with others Not difficult at all       Physical Exam:   General appearance - alert, well appearing, and in no distress  Mental status - alert, oriented to person, place, and time  Chest - clear to auscultation, no wheezes, rales or rhonchi, symmetric air entry   Heart - normal rate, regular rhythm, normal S1, S2, no murmurs, rubs, clicks or gallops   Abdomen - soft, nontender, nondistended, no masses or organomegaly  Lymph- no adenopathy palpable  Ext-peripheral pulses normal, no pedal edema, no clubbing or cyanosis  Skin-Warm and dry. no hyperpigmentation, vitiligo, or suspicious lesions  Neuro -alert, oriented, normal speech, no focal findings or movement disorder noted       Assessment/Plan:    Medication Side Effects and Warnings were discussed with patient: yes   Patient Labs were reviewed: yes  Patient Past Records were reviewed: yes  See orders below      ICD-10-CM ICD-9-CM    1. Encounter for medication refill  Z76.0 V68.1 ergocalciferol (ERGOCALCIFEROL) 1,250 mcg (50,000 unit) capsule      atorvastatin (LIPITOR) 20 mg tablet      lisinopril-hydroCHLOROthiazide (PRINZIDE, ZESTORETIC) 10-12.5 mg per tablet      2. Essential hypertension  I10 401.9 lisinopril-hydroCHLOROthiazide (PRINZIDE, ZESTORETIC) 10-12.5 mg per tablet      3. Mixed hyperlipidemia  E78.2 272.2 atorvastatin (LIPITOR) 20 mg tablet      4. Vitamin D deficiency  E55.9 268.9 ergocalciferol (ERGOCALCIFEROL) 1,250 mcg (50,000 unit) capsule      5. Anxiety and depression  F41.9 300.00 sertraline (ZOLOFT) 50 mg tablet    F32. A 311       6. History of prediabetes  Z87.898 V12.29 Refused labs today as patient has to go back to work, will check in next visit        Orders Placed This Encounter    ergocalciferol (ERGOCALCIFEROL) 1,250 mcg (50,000 unit) capsule     Sig: Take 1 Capsule by mouth every seven (7) days. Dispense:  12 Capsule     Refill:  0     NP KAITLIN. EddaWalker is no longer with practice. Pt would need to choose a new provider. atorvastatin (LIPITOR) 20 mg tablet     Sig: Take 1 Tablet by mouth daily. Indications: excessive fat in the blood     Dispense:  90 Tablet     Refill:  0     NP JShila Molinaon-Walker is no longer with practice. Pt would need to choose a new provider. lisinopril-hydroCHLOROthiazide (PRINZIDE, ZESTORETIC) 10-12.5 mg per tablet     Sig: Take 1 Tablet by mouth daily. Dispense:  90 Tablet     Refill:  0     NP JShila Julian-Walker is no longer with practice. Pt would need to choose a new provider.     sertraline (ZOLOFT) 50 mg tablet     Sig: Take 1 Tablet by mouth daily. Dispense:  30 Tablet     Refill:  2       Patient Instructions   Stop Zoloft 25 mg and start 50 mg Zoloft daily. Any ideation of hurting yourself of someone, call 911 immediately. Follow-up and Dispositions    Return in about 4 weeks (around 2/2/2023) for Reassess depression, anxiety and medication effectiveness. I have reviewed with the patient details of the assessment and plan and all questions were answered. Relevent patient education was performed. The most recent lab findings were reviewed with the patient. An After Visit Summary was printed and given to the patient.     Signed By: Jason Givens NP     January 5, 2023         -----------------------------------------------------------------------------------------------------------------------------------------

## 2023-01-26 NOTE — PROGRESS NOTES
Subjective:  Estee Leiva is a 62 y.o. female and presents with Hypertension and Medication Evaluation  . Hypertension Review:  The patient has essential hypertension  Diet and Lifestyle: generally follows a  low sodium diet, exercises sporadically  Home BP Monitoring: is not measured at home. Pertinent ROS: taking medications as instructed, no medication side effects noted, no TIA's, no chest pain on exertion, no dyspnea on exertion, no swelling of ankles. Review of Systems  Constitutional: negative for fevers, chills, anorexia and weight loss  Eyes:   negative for visual disturbance and irritation  ENT:   negative for tinnitus,sore throat,nasal congestion,ear pains. hoarseness  Respiratory:  negative for cough, hemoptysis, dyspnea,wheezing  CV:   negative for chest pain, palpitations, lower extremity edema  GI:   negative for nausea, vomiting, diarrhea, abdominal pain,melena  Endo:               negative for polyuria,polydipsia,polyphagia,heat intolerance  Genitourinary: negative for frequency, dysuria and hematuria  Integument:  negative for rash and pruritus  Hematologic:  negative for easy bruising and gum/nose bleeding  Musculoskel: negative for myalgias, arthralgias, back pain, muscle weakness, joint pain  Neurological:  negative for headaches, dizziness, vertigo, memory problems and gait   Behavl/Psych: negative for feelings of anxiety, depression, mood changes    Past Medical History:   Diagnosis Date    Blindness of left eye     Hypertension      Past Surgical History:   Procedure Laterality Date    HX GYN       Social History     Socioeconomic History    Marital status: UNKNOWN     Spouse name: Not on file    Number of children: Not on file    Years of education: Not on file    Highest education level: Not on file   Tobacco Use    Smoking status: Former Smoker     Packs/day: 3.00    Smokeless tobacco: Never Used    Tobacco comment: smoker for 17 years; 3 cigs a day   Substance and Sexual Activity    Alcohol use: Yes     Alcohol/week: 1.0 standard drinks     Types: 1 Glasses of wine per week     Comment: social    Drug use: No    Sexual activity: Never     Family History   Problem Relation Age of Onset    Hypertension Mother     Hypertension Father     Hypertension Sister     Hypertension Brother      Current Outpatient Medications   Medication Sig Dispense Refill    lisinopril-hydroCHLOROthiazide (PRINZIDE, ZESTORETIC) 10-12.5 mg per tablet TAKE 1 TABLET BY MOUTH EVERY DAY 30 Tab 0     No Known Allergies    Objective: There were no vitals taken for this visit. Physical Exam:   Himanshu Balderrama is a 62 y.o. female being evaluated by a Virtual Visit (video visit) encounter to address concerns as mentioned above. Due to this being a TeleHealth encounter (During [facility]-24 public health emergency), evaluation of the following organ systems was limited: Vitals/Constitutional/EENT/Resp/CV/GI//MS/Neuro/Skin/Heme-Lymph-Imm. Pursuant to the emergency declaration under the [address] [place] [address] authority and the [employer] Act, this Virtual Visit was conducted with patient's (and/or legal guardian's) consent, to reduce the risk of exposure to COVID-19 and provide necessary medical care. Services were provided through a video synchronous discussion virtually to substitute for in-person encounter.     Results for orders placed or performed in visit on 05/02/18   HEPATITIS C AB   Result Value Ref Range    Hep C Virus Ab <0.1 0.0 - 0.9 s/co ratio   METABOLIC PANEL, COMPREHENSIVE   Result Value Ref Range    Glucose 103 (H) 65 - 99 mg/dL    BUN 15 6 - 24 mg/dL    Creatinine 0.71 0.57 - 1.00 mg/dL    GFR est non-AA 95 >59 mL/min/1.73    GFR est  >59 mL/min/1.73    BUN/Creatinine ratio 21 9 - 23    Sodium 141 134 - 144 mmol/L    Potassium 4.2 3.5 - 5.2 mmol/L    Chloride 99 96 - 106 mmol/L    CO2 27 18 - 29 mmol/L    Calcium 9.8 8.7 - 10.2 mg/dL    Protein, total 8.0 6.0 - 8.5 g/dL    Albumin 4.6 3.5 - 5.5 g/dL    GLOBULIN, TOTAL 3.4 1.5 - 4.5 g/dL    A-G Ratio 1.4 1.2 - 2.2    Bilirubin, total 0.2 0.0 - 1.2 mg/dL    Alk. phosphatase 90 39 - 117 IU/L    AST (SGOT) 15 0 - 40 IU/L    ALT (SGPT) 14 0 - 32 IU/L   LIPID PANEL   Result Value Ref Range    Cholesterol, total 249 (H) 100 - 199 mg/dL    Triglyceride 187 (H) 0 - 149 mg/dL    HDL Cholesterol 55 >39 mg/dL    VLDL, calculated 37 5 - 40 mg/dL    LDL, calculated 157 (H) 0 - 99 mg/dL   CBC WITH AUTOMATED DIFF   Result Value Ref Range    WBC 7.3 3.4 - 10.8 x10E3/uL    RBC 4.31 3.77 - 5.28 x10E6/uL    HGB 13.2 11.1 - 15.9 g/dL    HCT 40.0 34.0 - 46.6 %    MCV 93 79 - 97 fL    MCH 30.6 26.6 - 33.0 pg    MCHC 33.0 31.5 - 35.7 g/dL    RDW 13.2 12.3 - 15.4 %    PLATELET 434 287 - 276 x10E3/uL    NEUTROPHILS 58 Not Estab. %    Lymphocytes 31 Not Estab. %    MONOCYTES 4 Not Estab. %    EOSINOPHILS 7 Not Estab. %    BASOPHILS 0 Not Estab. %    ABS. NEUTROPHILS 4.2 1.4 - 7.0 x10E3/uL    Abs Lymphocytes 2.3 0.7 - 3.1 x10E3/uL    ABS. MONOCYTES 0.3 0.1 - 0.9 x10E3/uL    ABS. EOSINOPHILS 0.5 (H) 0.0 - 0.4 x10E3/uL    ABS. BASOPHILS 0.0 0.0 - 0.2 x10E3/uL    IMMATURE GRANULOCYTES 0 Not Estab. %    ABS. IMM. GRANS. 0.0 0.0 - 0.1 x10E3/uL   HEMOGLOBIN A1C WITH EAG   Result Value Ref Range    Hemoglobin A1c 5.6 4.8 - 5.6 %    Estimated average glucose 114 mg/dL   VITAMIN D, 25 HYDROXY   Result Value Ref Range    VITAMIN D, 25-HYDROXY 10.3 (L) 30.0 - 100.0 ng/mL   CVD REPORT   Result Value Ref Range    INTERPRETATION Note        Assessment/Plan:    ICD-10-CM ICD-9-CM    1. Essential hypertension  I10 401.9 lisinopril-hydroCHLOROthiazide (PRINZIDE, ZESTORETIC) 10-12.5 mg per tablet   2.  Medication refill  Z76.0 V68.1 lisinopril-hydroCHLOROthiazide (PRINZIDE, ZESTORETIC) 10-12.5 mg per tablet     Orders Placed This Encounter    lisinopril-hydroCHLOROthiazide (Reji Muller) 10-12.5 mg per tablet     Sig: TAKE 1 TABLET BY MOUTH EVERY DAY     Dispense:  30 Tab     Refill:  0     Patient Instructions          High Blood Pressure: Care Instructions  Overview     It's normal for blood pressure to go up and down throughout the day. But if it stays up, you have high blood pressure. Another name for high blood pressure is hypertension. Despite what a lot of people think, high blood pressure usually doesn't cause headaches or make you feel dizzy or lightheaded. It usually has no symptoms. But it does increase your risk of stroke, heart attack, and other problems. You and your doctor will talk about your risks of these problems based on your blood pressure. Your doctor will give you a goal for your blood pressure. Your goal will be based on your health and your age. Lifestyle changes, such as eating healthy and being active, are always important to help lower blood pressure. You might also take medicine to reach your blood pressure goal.  Follow-up care is a key part of your treatment and safety. Be sure to make and go to all appointments, and call your doctor if you are having problems. It's also a good idea to know your test results and keep a list of the medicines you take. How can you care for yourself at home? Medical treatment  · If you stop taking your medicine, your blood pressure will go back up. You may take one or more types of medicine to lower your blood pressure. Be safe with medicines. Take your medicine exactly as prescribed. Call your doctor if you think you are having a problem with your medicine. · Talk to your doctor before you start taking aspirin every day. Aspirin can help certain people lower their risk of a heart attack or stroke. But taking aspirin isn't right for everyone, because it can cause serious bleeding. · See your doctor regularly. You may need to see the doctor more often at first or until your blood pressure comes down.   · If you are taking blood pressure medicine, talk to your doctor before you take decongestants or anti-inflammatory medicine, such as ibuprofen. Some of these medicines can raise blood pressure. · Learn how to check your blood pressure at home. Lifestyle changes  · Stay at a healthy weight. This is especially important if you put on weight around the waist. Losing even 10 pounds can help you lower your blood pressure. · If your doctor recommends it, get more exercise. Walking is a good choice. Bit by bit, increase the amount you walk every day. Try for at least 30 minutes on most days of the week. You also may want to swim, bike, or do other activities. · Avoid or limit alcohol. Talk to your doctor about whether you can drink any alcohol. · Try to limit how much sodium you eat to less than 2,300 milligrams (mg) a day. Your doctor may ask you to try to eat less than 1,500 mg a day. · Eat plenty of fruits (such as bananas and oranges), vegetables, legumes, whole grains, and low-fat dairy products. · Lower the amount of saturated fat in your diet. Saturated fat is found in animal products such as milk, cheese, and meat. Limiting these foods may help you lose weight and also lower your risk for heart disease. · Do not smoke. Smoking increases your risk for heart attack and stroke. If you need help quitting, talk to your doctor about stop-smoking programs and medicines. These can increase your chances of quitting for good. When should you call for help? Call  911 anytime you think you may need emergency care. This may mean having symptoms that suggest that your blood pressure is causing a serious heart or blood vessel problem. Your blood pressure may be over 180/120. For example, call 911 if:    · You have symptoms of a heart attack. These may include:  ? Chest pain or pressure, or a strange feeling in the chest.  ? Sweating. ? Shortness of breath. ? Nausea or vomiting.   ? Pain, pressure, or a strange feeling in the back, neck, jaw, or upper belly or in one or both shoulders or arms. ? Lightheadedness or sudden weakness. ? A fast or irregular heartbeat.     · You have symptoms of a stroke. These may include:  ? Sudden numbness, tingling, weakness, or loss of movement in your face, arm, or leg, especially on only one side of your body. ? Sudden vision changes. ? Sudden trouble speaking. ? Sudden confusion or trouble understanding simple statements. ? Sudden problems with walking or balance. ? A sudden, severe headache that is different from past headaches.     · You have severe back or belly pain. Do not wait until your blood pressure comes down on its own. Get help right away. Call your doctor now or seek immediate care if:    · Your blood pressure is much higher than normal (such as 180/120 or higher), but you don't have symptoms.     · You think high blood pressure is causing symptoms, such as:  ? Severe headache.  ? Blurry vision. Watch closely for changes in your health, and be sure to contact your doctor if:    · Your blood pressure measures higher than your doctor recommends at least 2 times. That means the top number is higher or the bottom number is higher, or both.     · You think you may be having side effects from your blood pressure medicine. Where can you learn more? Go to http://www.gray.com/  Enter [id number] in the search box to learn more about \"High Blood Pressure: Care Instructions. \"  Current as of: December 16, 2019               Content Version: 12.6  © 2119-6893 [employer]. Care instructions adapted under license by [employer] (which disclaims liability or warranty for this information). If you have questions about a medical condition or this instruction, always ask your healthcare professional. [place] [address] any warranty or liability for your use of this information.          Medication Refill: Care Instructions  Your Care Instructions Medicines are a big part of treatment for many health problems. So it can be upsetting to run out of your medicine. It may even be dangerous to stop a medicine suddenly. The doctor may have given you enough medicine to help you until you can see your regular doctor. The doctor has checked you carefully, but problems can develop later. If you notice any problems or new symptoms, get medical treatment right away. Follow-up care is a key part of your treatment and safety. Be sure to make and go to all appointments, and call your doctor if you are having problems. It's also a good idea to know your test results and keep a list of the medicines you take. How can you care for yourself at home? · Be safe with medicines. Take your medicines exactly as prescribed. · Know when you will run out of your medicine. Use a calendar to remind you to get a refill. Don't wait until you have only a few pills left. · Talk with your doctor or pharmacist about your medicine. Find out what to do if you miss a dose. When should you call for help? Call your doctor now or seek immediate medical care if:    · You have problems with your medicine. Watch closely for changes in your health, and be sure to contact your doctor if you have any problems. Where can you learn more? Go to http://www.gray.com/  Enter K326 in the search box to learn more about \"Medication Refill: Care Instructions. \"  Current as of: August 22, 2019               Content Version: 12.6  © 6559-5173 [employer]. Care instructions adapted under license by [employer] (which disclaims liability or warranty for this information). If you have questions about a medical condition or this instruction, always ask your healthcare professional. [place] [address] any warranty or liability for your use of this information.          Follow-up and Dispositions    · Return in about 2 weeks (around 10/28/2020), or if symptoms worsen or fail to improve, for Establish care & labs (HTN, Hyperlipidemia. care gaps). I have reviewed with the patient details of the assessment and plan and all questions were answered. Relevent patient education was performed. The most recent lab findings were reviewed with the patient. An After Visit Summary was printed and given to the patient. Ndc (300 Mg Prefilled Pen): [address]-73 Ndc (300 Mg Prefilled Pen): [address]-48

## 2023-03-20 DIAGNOSIS — F32.A ANXIETY AND DEPRESSION: ICD-10-CM

## 2023-03-20 DIAGNOSIS — F41.9 ANXIETY AND DEPRESSION: ICD-10-CM

## 2023-03-20 RX ORDER — SERTRALINE HYDROCHLORIDE 50 MG/1
50 TABLET, FILM COATED ORAL DAILY
Qty: 30 TABLET | Refills: 2 | Status: SHIPPED | OUTPATIENT
Start: 2023-03-20

## 2023-03-20 NOTE — TELEPHONE ENCOUNTER
I will refill it for a month until her next appointment. Please make follow up virtual appointment. She has seen me once in Jan and hasn't seen again with the increased dose plus she had no show appointment.

## 2023-04-01 DIAGNOSIS — Z76.0 ENCOUNTER FOR MEDICATION REFILL: ICD-10-CM

## 2023-04-01 DIAGNOSIS — I10 ESSENTIAL HYPERTENSION: ICD-10-CM

## 2023-04-24 RX ORDER — LISINOPRIL AND HYDROCHLOROTHIAZIDE 10; 12.5 MG/1; MG/1
TABLET ORAL
Qty: 90 TABLET | Refills: 0 | Status: SHIPPED | OUTPATIENT
Start: 2023-04-24

## 2023-05-01 DIAGNOSIS — Z76.0 ENCOUNTER FOR MEDICATION REFILL: ICD-10-CM

## 2023-05-01 DIAGNOSIS — E78.2 MIXED HYPERLIPIDEMIA: ICD-10-CM

## 2023-05-01 DIAGNOSIS — I10 ESSENTIAL HYPERTENSION: ICD-10-CM

## 2023-05-01 NOTE — TELEPHONE ENCOUNTER
Pt left a voice message requesting refills. [phone]    Last visit 01/05/2023 NP Mario Morales   Next appointment 05/01/2023 JENNY Cramer  Previous refill encounter(s)   01/05/2023 Lipitor #90,   04/24/2023 Prinzide #30 with 2 refills - e-scribed transaction failed. Please resend or phone in the prescriptions. For Pharmacy Admin Tracking Only    Program: Medication Refill  Intervention Detail: New Rx: 2, reason: Patient Preference  Time Spent (min): 5      Requested Prescriptions     Pending Prescriptions Disp Refills    atorvastatin (LIPITOR) 20 mg tablet 90 Tablet 0     Sig: Take 1 Tablet by mouth daily. Indications: excessive fat in the blood    lisinopril-hydroCHLOROthiazide (PRINZIDE, ZESTORETIC) 10-12.5 mg per tablet 90 Tablet 0     Sig: Take 1 Tablet by mouth daily.

## 2023-05-02 RX ORDER — ATORVASTATIN CALCIUM 20 MG/1
20 TABLET, FILM COATED ORAL DAILY
Qty: 30 TABLET | Refills: 0 | Status: SHIPPED | OUTPATIENT
Start: 2023-05-02 | End: 2023-05-02

## 2023-05-02 RX ORDER — LISINOPRIL AND HYDROCHLOROTHIAZIDE 10; 12.5 MG/1; MG/1
1 TABLET ORAL DAILY
Qty: 30 TABLET | Refills: 0 | Status: SHIPPED | OUTPATIENT
Start: 2023-05-02 | End: 2023-05-02

## 2023-05-04 ENCOUNTER — OFFICE VISIT (OUTPATIENT)
Dept: INTERNAL MEDICINE CLINIC | Age: 63
End: 2023-05-04

## 2023-05-04 VITALS
WEIGHT: 213 LBS | HEIGHT: 67 IN | HEART RATE: 83 BPM | DIASTOLIC BLOOD PRESSURE: 80 MMHG | BODY MASS INDEX: 33.43 KG/M2 | RESPIRATION RATE: 17 BRPM | TEMPERATURE: 97.5 F | SYSTOLIC BLOOD PRESSURE: 140 MMHG | OXYGEN SATURATION: 98 %

## 2023-05-04 DIAGNOSIS — F41.9 ANXIETY AND DEPRESSION: ICD-10-CM

## 2023-05-04 DIAGNOSIS — Z12.11 SCREEN FOR COLON CANCER: ICD-10-CM

## 2023-05-04 DIAGNOSIS — I10 ESSENTIAL HYPERTENSION: Primary | ICD-10-CM

## 2023-05-04 DIAGNOSIS — R73.03 PREDIABETES: ICD-10-CM

## 2023-05-04 DIAGNOSIS — E55.9 VITAMIN D DEFICIENCY: ICD-10-CM

## 2023-05-04 DIAGNOSIS — E78.2 MIXED HYPERLIPIDEMIA: ICD-10-CM

## 2023-05-04 DIAGNOSIS — F32.A ANXIETY AND DEPRESSION: ICD-10-CM

## 2023-05-04 RX ORDER — LOSARTAN POTASSIUM AND HYDROCHLOROTHIAZIDE 12.5; 5 MG/1; MG/1
1 TABLET ORAL DAILY
Qty: 30 TABLET | Refills: 2 | Status: SHIPPED | OUTPATIENT
Start: 2023-05-04

## 2023-05-04 RX ORDER — SERTRALINE HYDROCHLORIDE 50 MG/1
50 TABLET, FILM COATED ORAL DAILY
Qty: 30 TABLET | Refills: 2 | Status: SHIPPED | OUTPATIENT
Start: 2023-05-04

## 2023-05-05 LAB
25(OH)D3 SERPL-MCNC: 31.8 NG/ML (ref 30–100)
ALBUMIN SERPL-MCNC: 4.2 G/DL (ref 3.5–5)
ALBUMIN/GLOB SERPL: 1.1 (ref 1.1–2.2)
ALP SERPL-CCNC: 90 U/L (ref 45–117)
ALT SERPL-CCNC: 22 U/L (ref 12–78)
ANION GAP SERPL CALC-SCNC: 4 MMOL/L (ref 5–15)
AST SERPL-CCNC: 12 U/L (ref 15–37)
BILIRUB SERPL-MCNC: 0.5 MG/DL (ref 0.2–1)
BUN SERPL-MCNC: 12 MG/DL (ref 6–20)
BUN/CREAT SERPL: 14 (ref 12–20)
CALCIUM SERPL-MCNC: 9.8 MG/DL (ref 8.5–10.1)
CHLORIDE SERPL-SCNC: 105 MMOL/L (ref 97–108)
CHOLEST SERPL-MCNC: 202 MG/DL
CO2 SERPL-SCNC: 29 MMOL/L (ref 21–32)
CREAT SERPL-MCNC: 0.83 MG/DL (ref 0.55–1.02)
ERYTHROCYTE [DISTWIDTH] IN BLOOD BY AUTOMATED COUNT: 12.8 % (ref 11.5–14.5)
EST. AVERAGE GLUCOSE BLD GHB EST-MCNC: 114 MG/DL
GLOBULIN SER CALC-MCNC: 3.8 G/DL (ref 2–4)
GLUCOSE SERPL-MCNC: 107 MG/DL (ref 65–100)
HBA1C MFR BLD: 5.6 % (ref 4–5.6)
HCT VFR BLD AUTO: 40.9 % (ref 35–47)
HDLC SERPL-MCNC: 69 MG/DL
HDLC SERPL: 2.9 (ref 0–5)
HGB BLD-MCNC: 13 G/DL (ref 11.5–16)
LDLC SERPL CALC-MCNC: 113 MG/DL (ref 0–100)
MCH RBC QN AUTO: 31.1 PG (ref 26–34)
MCHC RBC AUTO-ENTMCNC: 31.8 G/DL (ref 30–36.5)
MCV RBC AUTO: 97.8 FL (ref 80–99)
NRBC # BLD: 0 K/UL (ref 0–0.01)
NRBC BLD-RTO: 0 PER 100 WBC
PLATELET # BLD AUTO: 300 K/UL (ref 150–400)
PMV BLD AUTO: 9.6 FL (ref 8.9–12.9)
POTASSIUM SERPL-SCNC: 4 MMOL/L (ref 3.5–5.1)
PROT SERPL-MCNC: 8 G/DL (ref 6.4–8.2)
RBC # BLD AUTO: 4.18 M/UL (ref 3.8–5.2)
SODIUM SERPL-SCNC: 138 MMOL/L (ref 136–145)
TRIGL SERPL-MCNC: 100 MG/DL (ref ?–150)
VLDLC SERPL CALC-MCNC: 20 MG/DL
WBC # BLD AUTO: 6.3 K/UL (ref 3.6–11)

## 2023-05-16 ENCOUNTER — OFFICE VISIT (OUTPATIENT)
Facility: CLINIC | Age: 63
End: 2023-05-16
Payer: COMMERCIAL

## 2023-05-16 VITALS
OXYGEN SATURATION: 97 % | SYSTOLIC BLOOD PRESSURE: 140 MMHG | TEMPERATURE: 97.6 F | WEIGHT: 213 LBS | HEART RATE: 87 BPM | RESPIRATION RATE: 16 BRPM | DIASTOLIC BLOOD PRESSURE: 80 MMHG | HEIGHT: 67 IN | BODY MASS INDEX: 33.43 KG/M2

## 2023-05-16 DIAGNOSIS — F43.21 GRIEF AT LOSS OF CHILD: ICD-10-CM

## 2023-05-16 DIAGNOSIS — I10 ESSENTIAL HYPERTENSION: Primary | ICD-10-CM

## 2023-05-16 DIAGNOSIS — Z63.4 GRIEF AT LOSS OF CHILD: ICD-10-CM

## 2023-05-16 PROCEDURE — 3078F DIAST BP <80 MM HG: CPT

## 2023-05-16 PROCEDURE — 3074F SYST BP LT 130 MM HG: CPT

## 2023-05-16 PROCEDURE — 99212 OFFICE O/P EST SF 10 MIN: CPT

## 2023-05-16 RX ORDER — LOSARTAN POTASSIUM AND HYDROCHLOROTHIAZIDE 12.5; 5 MG/1; MG/1
1 TABLET ORAL DAILY
COMMUNITY
Start: 2023-05-04 | End: 2023-05-17

## 2023-05-16 SDOH — ECONOMIC STABILITY: INCOME INSECURITY: HOW HARD IS IT FOR YOU TO PAY FOR THE VERY BASICS LIKE FOOD, HOUSING, MEDICAL CARE, AND HEATING?: SOMEWHAT HARD

## 2023-05-16 SDOH — ECONOMIC STABILITY: HOUSING INSECURITY
IN THE LAST 12 MONTHS, WAS THERE A TIME WHEN YOU DID NOT HAVE A STEADY PLACE TO SLEEP OR SLEPT IN A SHELTER (INCLUDING NOW)?: NO

## 2023-05-16 SDOH — ECONOMIC STABILITY: FOOD INSECURITY: WITHIN THE PAST 12 MONTHS, YOU WORRIED THAT YOUR FOOD WOULD RUN OUT BEFORE YOU GOT MONEY TO BUY MORE.: SOMETIMES TRUE

## 2023-05-16 SDOH — ECONOMIC STABILITY: FOOD INSECURITY: WITHIN THE PAST 12 MONTHS, THE FOOD YOU BOUGHT JUST DIDN'T LAST AND YOU DIDN'T HAVE MONEY TO GET MORE.: OFTEN TRUE

## 2023-05-16 SDOH — SOCIAL STABILITY - SOCIAL INSECURITY: DISSAPEARANCE AND DEATH OF FAMILY MEMBER: Z63.4

## 2023-05-16 NOTE — PROGRESS NOTES
Cl Del Toro is a 59 y.o. female , established patient, here for evaluation of the following chief complaint(s): Hypertension and Results       Subjective:    Cardiovascular Review:  The patient has hypertension and hyperlipidemia. Diet and Lifestyle: generally hasn't tried to follow a low fat low cholesterol and low sodium diet, doesn't exercise regularly, nonsmoker   Home BP Monitoring: is not measured at home. Pertinent ROS: Taking medicine as instructed, lisinopril-hctz 10-12.5 mg daily and Lipitor 20 mg daily, no medication side effects noted, no TIA's, no chest pain on exertion, no dyspnea on exertion, no swelling of ankles. Prediabetes Review:  Cl Del Toro has pre-diabetes  Diabetic ROS -negative for polyuria,polydipsia,polyphagia, and heat intolerance  Medication compliance: n/a   Home glucose monitoring: n/a   Weight: stable     Vitamin D Deficiency Review:  Patient has a history of vit D deficiency, taking medicine as prescribed. Patient denies symptoms of bone pain, muscle weakness, fatigue or other neurological symptoms. Depression and Anxiety Review:  Patient is taking 50 mg Zoloft daily, states that she is not feeling good today, emotional at the visit today and anniversary of her son's death who passed away last year. Ongoing symptoms of anxiety and depression but no problems with sleep,denies recurrent thoughts of death and suicidal thoughts. Review of Systems  Constitutional: negative for fevers, chills, anorexia and weight loss  Eyes:   negative for visual disturbance and irritation  ENT:   negative for tinnitus,sore throat,nasal congestion,ear pains. hoarseness  Respiratory:  negative for cough, hemoptysis, dyspnea,wheezing  CV:   negative for chest pain, palpitations, lower extremity edema  GI:   negative for nausea, vomiting, diarrhea, abdominal pain,melena  Endo:               negative for polyuria,polydipsia,polyphagia,heat intolerance  Genitourinary: negative for

## 2023-05-16 NOTE — PROGRESS NOTES
Chief Complaint   Patient presents with    Hypertension    Results     1. Have you been to the ER, urgent care clinic since your last visit? Hospitalized since your last visit? No    2. Have you seen or consulted any other health care providers outside of the [address] [place] [address] since your last visit? Include any pap smears or colon screening.  No

## 2023-05-16 NOTE — PATIENT INSTRUCTIONS
Take 1 and 1/2 of losartan-hctz (hyzaar) daily in the morning. Check BP daily and write it down at the log. Call and make an appointment with the referred therapist today.

## 2023-05-17 RX ORDER — LOSARTAN POTASSIUM AND HYDROCHLOROTHIAZIDE 12.5; 5 MG/1; MG/1
1.5 TABLET ORAL DAILY
Qty: 45 TABLET | Refills: 0 | Status: SHIPPED | OUTPATIENT
Start: 2023-05-17

## 2023-05-26 DIAGNOSIS — Z76.0 ENCOUNTER FOR ISSUE OF REPEAT PRESCRIPTION: ICD-10-CM

## 2023-05-26 DIAGNOSIS — I10 ESSENTIAL (PRIMARY) HYPERTENSION: ICD-10-CM

## 2023-05-26 RX ORDER — LISINOPRIL AND HYDROCHLOROTHIAZIDE 12.5; 1 MG/1; MG/1
TABLET ORAL
Qty: 30 TABLET | OUTPATIENT
Start: 2023-05-26

## 2023-07-30 DIAGNOSIS — E78.2 MIXED HYPERLIPIDEMIA: ICD-10-CM

## 2023-07-30 DIAGNOSIS — Z76.0 ENCOUNTER FOR ISSUE OF REPEAT PRESCRIPTION: ICD-10-CM

## 2023-08-02 RX ORDER — ATORVASTATIN CALCIUM 20 MG/1
TABLET, FILM COATED ORAL
Qty: 90 TABLET | Refills: 0 | Status: SHIPPED | OUTPATIENT
Start: 2023-08-02

## 2023-08-03 DIAGNOSIS — I10 ESSENTIAL HYPERTENSION: ICD-10-CM

## 2023-08-10 RX ORDER — LOSARTAN POTASSIUM AND HYDROCHLOROTHIAZIDE 12.5; 5 MG/1; MG/1
TABLET ORAL
Qty: 90 TABLET | Refills: 0 | Status: SHIPPED | OUTPATIENT
Start: 2023-08-10

## 2023-08-18 DIAGNOSIS — I10 ESSENTIAL HYPERTENSION: ICD-10-CM

## 2023-08-18 RX ORDER — LOSARTAN POTASSIUM AND HYDROCHLOROTHIAZIDE 12.5; 5 MG/1; MG/1
TABLET ORAL
Qty: 135 TABLET | Refills: 1 | Status: SHIPPED | OUTPATIENT
Start: 2023-08-18

## 2023-11-24 ENCOUNTER — TELEPHONE (OUTPATIENT)
Facility: CLINIC | Age: 63
End: 2023-11-24

## 2023-11-24 DIAGNOSIS — E78.2 MIXED HYPERLIPIDEMIA: ICD-10-CM

## 2023-11-24 DIAGNOSIS — Z76.0 ENCOUNTER FOR ISSUE OF REPEAT PRESCRIPTION: ICD-10-CM

## 2023-11-24 RX ORDER — ATORVASTATIN CALCIUM 20 MG/1
TABLET, FILM COATED ORAL
Qty: 90 TABLET | Refills: 0 | Status: SHIPPED | OUTPATIENT
Start: 2023-11-24

## 2023-11-24 NOTE — TELEPHONE ENCOUNTER
Called pt per PCP req, to schedule a fu in order to get any more medication within the next 3 mo's.  Pt stated that she would call back and schedule, once she has looked at her schedule.

## 2024-01-15 ENCOUNTER — OFFICE VISIT (OUTPATIENT)
Facility: CLINIC | Age: 64
End: 2024-01-15
Payer: COMMERCIAL

## 2024-01-15 VITALS
BODY MASS INDEX: 32.96 KG/M2 | DIASTOLIC BLOOD PRESSURE: 79 MMHG | TEMPERATURE: 98.3 F | OXYGEN SATURATION: 96 % | SYSTOLIC BLOOD PRESSURE: 126 MMHG | HEIGHT: 67 IN | RESPIRATION RATE: 16 BRPM | WEIGHT: 210 LBS | HEART RATE: 98 BPM

## 2024-01-15 DIAGNOSIS — H66.90 ACUTE OTITIS MEDIA, UNSPECIFIED OTITIS MEDIA TYPE: Primary | ICD-10-CM

## 2024-01-15 DIAGNOSIS — I10 ESSENTIAL (PRIMARY) HYPERTENSION: ICD-10-CM

## 2024-01-15 DIAGNOSIS — Z12.31 ENCOUNTER FOR SCREENING MAMMOGRAM FOR MALIGNANT NEOPLASM OF BREAST: ICD-10-CM

## 2024-01-15 DIAGNOSIS — Z12.11 SCREENING FOR MALIGNANT NEOPLASM OF COLON: ICD-10-CM

## 2024-01-15 DIAGNOSIS — R73.03 PREDIABETES: ICD-10-CM

## 2024-01-15 DIAGNOSIS — Z11.4 ENCOUNTER FOR SCREENING FOR HIV: ICD-10-CM

## 2024-01-15 DIAGNOSIS — E78.00 HYPERCHOLESTEROLEMIA: ICD-10-CM

## 2024-01-15 DIAGNOSIS — J00 ACUTE RHINITIS: ICD-10-CM

## 2024-01-15 LAB
ALBUMIN SERPL-MCNC: 3.8 G/DL (ref 3.5–5)
ALBUMIN/GLOB SERPL: 0.9 (ref 1.1–2.2)
ALP SERPL-CCNC: 106 U/L (ref 45–117)
ALT SERPL-CCNC: 18 U/L (ref 12–78)
ANION GAP SERPL CALC-SCNC: 5 MMOL/L (ref 5–15)
AST SERPL-CCNC: 10 U/L (ref 15–37)
BILIRUB SERPL-MCNC: 0.4 MG/DL (ref 0.2–1)
BUN SERPL-MCNC: 11 MG/DL (ref 6–20)
BUN/CREAT SERPL: 13 (ref 12–20)
CALCIUM SERPL-MCNC: 10.1 MG/DL (ref 8.5–10.1)
CHLORIDE SERPL-SCNC: 107 MMOL/L (ref 97–108)
CHOLEST SERPL-MCNC: 199 MG/DL
CO2 SERPL-SCNC: 29 MMOL/L (ref 21–32)
CREAT SERPL-MCNC: 0.85 MG/DL (ref 0.55–1.02)
ERYTHROCYTE [DISTWIDTH] IN BLOOD BY AUTOMATED COUNT: 12 % (ref 11.5–14.5)
EST. AVERAGE GLUCOSE BLD GHB EST-MCNC: 114 MG/DL
GLOBULIN SER CALC-MCNC: 4.1 G/DL (ref 2–4)
GLUCOSE SERPL-MCNC: 122 MG/DL (ref 65–100)
HBA1C MFR BLD: 5.6 % (ref 4–5.6)
HCT VFR BLD AUTO: 38.2 % (ref 35–47)
HDLC SERPL-MCNC: 50 MG/DL
HDLC SERPL: 4 (ref 0–5)
HGB BLD-MCNC: 12.9 G/DL (ref 11.5–16)
HIV 1+2 AB+HIV1 P24 AG SERPL QL IA: NONREACTIVE
HIV 1/2 RESULT COMMENT: NORMAL
LDLC SERPL CALC-MCNC: 121 MG/DL (ref 0–100)
MCH RBC QN AUTO: 32 PG (ref 26–34)
MCHC RBC AUTO-ENTMCNC: 33.8 G/DL (ref 30–36.5)
MCV RBC AUTO: 94.8 FL (ref 80–99)
NRBC # BLD: 0 K/UL (ref 0–0.01)
NRBC BLD-RTO: 0 PER 100 WBC
PLATELET # BLD AUTO: 324 K/UL (ref 150–400)
PMV BLD AUTO: 9.1 FL (ref 8.9–12.9)
POTASSIUM SERPL-SCNC: 3.6 MMOL/L (ref 3.5–5.1)
PROT SERPL-MCNC: 7.9 G/DL (ref 6.4–8.2)
RBC # BLD AUTO: 4.03 M/UL (ref 3.8–5.2)
SODIUM SERPL-SCNC: 141 MMOL/L (ref 136–145)
TRIGL SERPL-MCNC: 140 MG/DL
VLDLC SERPL CALC-MCNC: 28 MG/DL
WBC # BLD AUTO: 10.3 K/UL (ref 3.6–11)

## 2024-01-15 PROCEDURE — 3078F DIAST BP <80 MM HG: CPT

## 2024-01-15 PROCEDURE — 3074F SYST BP LT 130 MM HG: CPT

## 2024-01-15 PROCEDURE — 99214 OFFICE O/P EST MOD 30 MIN: CPT

## 2024-01-15 RX ORDER — FLUTICASONE PROPIONATE 50 MCG
1 SPRAY, SUSPENSION (ML) NASAL DAILY
Qty: 32 G | Refills: 1 | Status: SHIPPED | OUTPATIENT
Start: 2024-01-15

## 2024-01-15 RX ORDER — AMOXICILLIN AND CLAVULANATE POTASSIUM 875; 125 MG/1; MG/1
1 TABLET, FILM COATED ORAL 2 TIMES DAILY
Qty: 14 TABLET | Refills: 0 | Status: SHIPPED | OUTPATIENT
Start: 2024-01-15 | End: 2024-01-22

## 2024-01-15 SDOH — ECONOMIC STABILITY: FOOD INSECURITY: WITHIN THE PAST 12 MONTHS, THE FOOD YOU BOUGHT JUST DIDN'T LAST AND YOU DIDN'T HAVE MONEY TO GET MORE.: NEVER TRUE

## 2024-01-15 SDOH — ECONOMIC STABILITY: INCOME INSECURITY: HOW HARD IS IT FOR YOU TO PAY FOR THE VERY BASICS LIKE FOOD, HOUSING, MEDICAL CARE, AND HEATING?: NOT HARD AT ALL

## 2024-01-15 SDOH — ECONOMIC STABILITY: FOOD INSECURITY: WITHIN THE PAST 12 MONTHS, YOU WORRIED THAT YOUR FOOD WOULD RUN OUT BEFORE YOU GOT MONEY TO BUY MORE.: NEVER TRUE

## 2024-01-15 ASSESSMENT — PATIENT HEALTH QUESTIONNAIRE - PHQ9
SUM OF ALL RESPONSES TO PHQ QUESTIONS 1-9: 0
SUM OF ALL RESPONSES TO PHQ QUESTIONS 1-9: 0
2. FEELING DOWN, DEPRESSED OR HOPELESS: 0
SUM OF ALL RESPONSES TO PHQ QUESTIONS 1-9: 0
1. LITTLE INTEREST OR PLEASURE IN DOING THINGS: 0
SUM OF ALL RESPONSES TO PHQ QUESTIONS 1-9: 0
SUM OF ALL RESPONSES TO PHQ9 QUESTIONS 1 & 2: 0

## 2024-01-15 NOTE — PROGRESS NOTES
Josie Roque is a 63 y.o. female , established patient, here for evaluation of the following chief complaint(s): Otalgia     Subjective:    Ear Pain:   Patient presents with right ear pain.  Symptoms include right ear pain and congestion. Symptoms began a few days ago and are gradually worsening since that time. Patient denies tinnitus, hoarseness, chills, fever, sore throat, and but states that she had recent URI a week ago . Ear history: 0 previous ear infections in the past year.     Cardiovascular Review:  The patient has hypertension and hyperlipidemia.  Diet and Lifestyle: generally hasn't tried to follow a low fat low cholesterol and low sodium diet, doesn't exercise regularly, nonsmoker   Home BP Monitoring: is measured at home, stable.  Pertinent ROS: Taking medicine as instructed, losartan-hctz 50-12.5 mg daily and Lipitor 20 mg daily, no medication side effects noted, no TIA's, no chest pain on exertion, no dyspnea on exertion, no swelling of ankles.       Prediabetes Review:  Josie Roque has pre-diabetes  Diabetic ROS -negative for polyuria,polydipsia,polyphagia, and heat intolerance  Medication compliance: n/a   Home glucose monitoring: n/a   Weight: stable     Vitamin D Deficiency:  Patient has a history of vit D deficiency, taking medicine as prescribed. Patient denies symptoms of bone pain, muscle weakness, fatigue or other neurological symptoms.     Depression Review:  Patient is seen for followup of depression. Treatment includes zoloft 50 mg which patient stopped taking months ago and no other therapies. In remission.  The patient denies recurrent thoughts of death and suicidal thoughts without plan.   The patient experiences the following side effects from the treatment: none.    Health Maintenance  Flu vaccine: Refused  Covid Vaccine: Refused  Smoker: None  Alcohol: Occasional  Breast cancer screening- ordering mammogram today  Cervical cancer screening - recent one on may 2018, states

## 2024-01-15 NOTE — PROGRESS NOTES
Chief Complaint   Patient presents with    Otalgia     1. Have you been to the ER, urgent care clinic since your last visit?  Hospitalized since your last visit? No    2. Have you seen or consulted any other health care providers outside of the [facility] since your last visit?  Include any pap smears or colon screening. No

## 2024-01-15 NOTE — PATIENT INSTRUCTIONS
Stay active, eat a LOW-CARB diet (avoiding bread, rice, pasta, desserts, soda)  with fish and other lean meats, take a fish oil supplement with DHA and EPA daily, along with exercise to help reduce cholesterol and raise good cholesterol. Eating healthy nuts like walnuts, pecans, and ALMONDS may help also. You can also try PURPLE SEA ROTH (found in health food stores like [employer] and [employer]).    Work on diet and exercise to lower your A1C, it is in the PRE-DIABETIC range, which places you at increased risk of developing DIABETES. Eating a low-carb diet and staying mindful of your sugar intake will help lower this number. Try Stevia instead of sugar, avoid sodas and candy. Use BROWN instead of WHITE (rice, pasta, bread), but overall aim to eat less of ALL of these. Also try eating 1 TSP of Ground amgo and CINNAMON ([place]) DAILY in your food. May eat cinnamon over an apple, smoothie, or oatmeal. Mago is a great spice for your vegetables, meats, and soup.  We need to repeat this lab in 6 months, please schedule an office visit to follow-up.

## 2024-01-16 RX ORDER — ATORVASTATIN CALCIUM 40 MG/1
TABLET, FILM COATED ORAL
Qty: 90 TABLET | Refills: 1 | Status: SHIPPED | OUTPATIENT
Start: 2024-01-16

## 2024-01-16 RX ORDER — ATORVASTATIN CALCIUM 20 MG/1
TABLET, FILM COATED ORAL
Qty: 90 TABLET | Refills: 0 | Status: SHIPPED | OUTPATIENT
Start: 2024-01-16 | End: 2024-01-16 | Stop reason: SDUPTHER

## 2024-02-27 DIAGNOSIS — I10 ESSENTIAL HYPERTENSION: ICD-10-CM

## 2024-02-27 RX ORDER — LOSARTAN POTASSIUM AND HYDROCHLOROTHIAZIDE 12.5; 5 MG/1; MG/1
TABLET ORAL
Qty: 45 TABLET | Refills: 2 | Status: SHIPPED | OUTPATIENT
Start: 2024-02-27

## 2024-02-27 NOTE — TELEPHONE ENCOUNTER
Last appointment: 01/15/2024 MAYO Sandoval   Next appointment: Nothing scheduled   Previous refill encounter(s):   08/18/2023 Hyzaar #[id number] with 1 refill.     For Pharmacy Admin Tracking Only    Program: Medication Refill  Intervention Detail: New Rx: 1, reason: Patient Preference  Time Spent (min): 5    Requested Prescriptions     Pending Prescriptions Disp Refills    losartan-hydroCHLOROthiazide (HYZAAR) 50-12.5 MG per tablet [Pharmacy Med Name: LOSARTAN-HCTZ 50-12.5 MG TAB] 135 tablet 0     Sig: TAKE 1 AND 1/2 TABLETS DAILY BY MOUTH

## 2024-05-25 DIAGNOSIS — I10 ESSENTIAL HYPERTENSION: ICD-10-CM

## 2024-05-26 DIAGNOSIS — E78.00 HYPERCHOLESTEROLEMIA: ICD-10-CM

## 2024-06-05 DIAGNOSIS — E78.00 HYPERCHOLESTEROLEMIA: ICD-10-CM

## 2024-06-05 RX ORDER — LOSARTAN POTASSIUM AND HYDROCHLOROTHIAZIDE 12.5; 5 MG/1; MG/1
1.5 TABLET ORAL DAILY
Qty: 135 TABLET | Refills: 1 | Status: SHIPPED | OUTPATIENT
Start: 2024-06-05

## 2024-06-05 RX ORDER — ATORVASTATIN CALCIUM 20 MG/1
TABLET, FILM COATED ORAL
Qty: 90 TABLET | Refills: 0 | OUTPATIENT
Start: 2024-06-05

## 2024-06-05 RX ORDER — ATORVASTATIN CALCIUM 40 MG/1
TABLET, FILM COATED ORAL
Qty: 90 TABLET | Refills: 1 | Status: SHIPPED | OUTPATIENT
Start: 2024-06-05

## 2024-06-29 DIAGNOSIS — F41.9 ANXIETY DISORDER, UNSPECIFIED: ICD-10-CM

## 2024-07-16 DIAGNOSIS — F41.9 ANXIETY DISORDER, UNSPECIFIED: ICD-10-CM

## 2024-09-11 ENCOUNTER — COMMUNITY OUTREACH (OUTPATIENT)
Facility: CLINIC | Age: 64
End: 2024-09-11

## 2024-09-24 ENCOUNTER — TELEPHONE (OUTPATIENT)
Facility: CLINIC | Age: 64
End: 2024-09-24

## 2025-05-19 DIAGNOSIS — E78.00 HYPERCHOLESTEROLEMIA: ICD-10-CM

## 2025-05-19 DIAGNOSIS — I10 ESSENTIAL HYPERTENSION: ICD-10-CM

## 2025-05-19 RX ORDER — LOSARTAN POTASSIUM AND HYDROCHLOROTHIAZIDE 12.5; 5 MG/1; MG/1
1.5 TABLET ORAL DAILY
Qty: 45 TABLET | Refills: 1 | Status: SHIPPED | OUTPATIENT
Start: 2025-05-19

## 2025-05-19 RX ORDER — ATORVASTATIN CALCIUM 40 MG/1
TABLET, FILM COATED ORAL
Qty: 30 TABLET | Refills: 1 | Status: SHIPPED | OUTPATIENT
Start: 2025-05-19

## 2025-05-19 NOTE — TELEPHONE ENCOUNTER
PATIENT CALLED TO ASK FOR A REFILL ON HER BP MEDICATION.  SHE HAS NOT BEEN SEEN SINCE 01/15/2024 & I EXPLAINED THAT SHE HAS TO HAVE AN IN-PERSON VISIT.  I SCHEDULED HER FOR JEREMY'S FIRST AVAILABLE WHICH IS NOT UNTIL 06/25/2025.  PT IS ASKING FOR A REFILL UNTIL THAT TIME WHICH GIVES HER TIME TO FIND SOMEONE TO STAY WITH HER FATHER WHILE SHE COMES TO THIS APPT.  SHE JUST RECENTLY LOST HER FATHER AND HAS BECOME HIS PRIMARY CAREGIVER. PLEASE LET HER KNOW IF THE REFILL WILL BE TAKEN CARE OF.  I HAVE CONFIRMED THE PHARMACY THAT IS ON FILE.

## 2025-06-12 DIAGNOSIS — E78.00 HYPERCHOLESTEROLEMIA: ICD-10-CM

## 2025-06-13 NOTE — TELEPHONE ENCOUNTER
Patient's last appointment was on January 2024.  She has a follow-up on June 25.  Medicine refilled but if patient is not also then further refills will not be approved.  Thank you!

## 2025-06-16 RX ORDER — ATORVASTATIN CALCIUM 40 MG/1
40 TABLET, FILM COATED ORAL
Qty: 30 TABLET | Refills: 0 | Status: SHIPPED | OUTPATIENT
Start: 2025-06-16

## 2025-06-25 ENCOUNTER — OFFICE VISIT (OUTPATIENT)
Facility: CLINIC | Age: 65
End: 2025-06-25
Payer: COMMERCIAL

## 2025-06-25 VITALS
DIASTOLIC BLOOD PRESSURE: 88 MMHG | BODY MASS INDEX: 34.21 KG/M2 | TEMPERATURE: 96.8 F | HEIGHT: 67 IN | WEIGHT: 218 LBS | RESPIRATION RATE: 15 BRPM | HEART RATE: 70 BPM | OXYGEN SATURATION: 99 % | SYSTOLIC BLOOD PRESSURE: 135 MMHG

## 2025-06-25 DIAGNOSIS — E78.00 HYPERCHOLESTEROLEMIA: ICD-10-CM

## 2025-06-25 DIAGNOSIS — E55.9 VITAMIN D DEFICIENCY: ICD-10-CM

## 2025-06-25 DIAGNOSIS — I10 ESSENTIAL HYPERTENSION: Primary | ICD-10-CM

## 2025-06-25 DIAGNOSIS — Z12.31 ENCOUNTER FOR SCREENING MAMMOGRAM FOR BREAST CANCER: ICD-10-CM

## 2025-06-25 DIAGNOSIS — E66.811 OBESITY, CLASS I, BMI 30-34.9: ICD-10-CM

## 2025-06-25 DIAGNOSIS — Z12.11 SCREENING FOR MALIGNANT NEOPLASM OF COLON: ICD-10-CM

## 2025-06-25 DIAGNOSIS — R73.03 PREDIABETES: ICD-10-CM

## 2025-06-25 PROBLEM — M79.605 PAIN OF LEFT LOWER EXTREMITY: Status: ACTIVE | Noted: 2017-08-28

## 2025-06-25 PROCEDURE — 3075F SYST BP GE 130 - 139MM HG: CPT

## 2025-06-25 PROCEDURE — 99214 OFFICE O/P EST MOD 30 MIN: CPT

## 2025-06-25 PROCEDURE — 3079F DIAST BP 80-89 MM HG: CPT

## 2025-06-25 RX ORDER — LOSARTAN POTASSIUM AND HYDROCHLOROTHIAZIDE 12.5; 5 MG/1; MG/1
1.5 TABLET ORAL DAILY
Qty: 45 TABLET | Refills: 1 | Status: SHIPPED | OUTPATIENT
Start: 2025-06-25

## 2025-06-25 RX ORDER — ATORVASTATIN CALCIUM 40 MG/1
40 TABLET, FILM COATED ORAL
Qty: 90 TABLET | Refills: 1 | Status: SHIPPED | OUTPATIENT
Start: 2025-06-25

## 2025-06-25 ASSESSMENT — PATIENT HEALTH QUESTIONNAIRE - PHQ9
SUM OF ALL RESPONSES TO PHQ QUESTIONS 1-9: 0
1. LITTLE INTEREST OR PLEASURE IN DOING THINGS: NOT AT ALL
SUM OF ALL RESPONSES TO PHQ QUESTIONS 1-9: 0
2. FEELING DOWN, DEPRESSED OR HOPELESS: NOT AT ALL

## 2025-06-25 NOTE — PROGRESS NOTES
Josie Roque is a 65 y.o. female , established patient, here for evaluation of the following chief complaint(s): Hypertension and Medication Refill     Subjective:  History of Present Illness  The patient is a 65-year-old female who presents for follow-up. She was last seen in January 2024.    Mental Health  - No current anxiety or depression  - Was on sertraline, but stopped medicine herself.  - Off sertraline for 1.5 months, feeling well  - Significant personal loss: mother passed November 2024, son in 2021  - Managing grief effectively    Weight  - Weight gain of 8 pounds since last visit  - Improving diet with more boiled/baked foods, fewer fried foods  - Good vegetable intake  - Attempting to exercise regularly    Medications  - On losartan/hydrochlorothiazide 1.5 tablets for hypertension  - On atorvastatin 40 mg for hyperlipidemia  - Tolerating medicine well  - Denies any muscle ache, headache, blurry vision or dizziness    Preventive Care  - Due for mammogram  - No abnormal Pap smear history  - Tetanus vaccine 8 years ago    BP Readings from Last 3 Encounters:   06/25/25 135/88   01/15/24 126/79   05/16/23 (!) 140/80     Reviewed PmHx, RxHx, FmHx, SocHx, AllgHx and updated in chart.     Review of Systems  Constitutional: negative for fevers, chills, anorexia and weight loss  Eyes:   negative for visual disturbance and irritation  ENT:   negative for tinnitus,sore throat,nasal congestion,ear pains.hoarseness  Respiratory:  negative for cough, hemoptysis, dyspnea,wheezing  CV:   negative for chest pain, palpitations, lower extremity edema  GI:   negative for nausea, vomiting, diarrhea, abdominal pain,melena  Endo:               negative for polyuria,polydipsia,polyphagia,heat intolerance  Genitourinary: negative for frequency, dysuria and hematuria  Integument:  negative for rash and pruritus  Hematologic:  negative for easy bruising and gum/nose bleeding  Musculoskel: negative for myalgias, arthralgias,

## 2025-06-25 NOTE — PROGRESS NOTES
Chief Complaint   Patient presents with    Hypertension    Medication Refill     1. Have you been to the ER, urgent care clinic since your last visit?  Hospitalized since your last visit?No    2. Have you seen or consulted any other health care providers outside of the [facility] since your last visit?  Include any pap smears or colon screening. No

## 2025-06-25 NOTE — PATIENT INSTRUCTIONS
WEIGHT LOSS PLAN    1.  Read food labels and count calories.  Goal 6983-8082 calories daily for women and 7152-4579 calories daily for men as you are weaning off sugar and preparing to start a Low Calorie Dietary Approach.  Achieve this by decreasing caloric intake by 500 calories by weekly increments.  NOTE:  1 pound of fat = 3500 kilocalories!    Www.[email or web address]  [email or web address]  [email or web address]  Www.wireWAX.gov  Weight watchers mobile    Calories needed to lose 1-2 pounds a week = 10 x your weight in pounds    2.  Increase water intake.    Per [employer] Weight loss Program, it is important to drink 1/2 your body weight in ounces of water daily.  Decrease your water consumption 2-3 hours before bedtime to prevent sleep disturbance from frequent urination.    3.  Decrease sugary beverages.  Each can or glass of soda increases your risk of obesity by 60%.  You can lose 10 pounds in a month by avoiding any soda.     12 oz can of soda = 140 calories, 16 oz cup of sweet tea = 200 calories    16 oz orange juice = 200 calories, 10 oz apple juice = 150 calories   32 oz sports drink = 200 calories, 16 oz punch = 240 calories   3.5 oz alcohol = 100-150 calories     4.  Avoid High Fructose Corn Syrup products.  This ingredient makes products highly addictive!    5.  Exercise 30 minutes daily 5 days weekly, minimally.  If you burn 3500 calories that equals a pound!  Try using a pedometer  or a smart watch to your count steps. Visit www.Loogla.[employer] for a free pedometer and diet recommendations.      Your maximum heart rate = 220 - your age    Never exercise at your maximum heart rate.    See handout for target heart rate.    6.  Decrease carbohydrates (white bread, pasta, rice, potatoes, sweet foods and sweet drinks like soda, tea, coffee, juice and sports drinks).  Increase fiber and protein.    Goal:   calories daily of carbohydrates     Try CHROMIUM PICONATE 200 MG THREE TIMES DAILY,    to

## 2025-06-26 LAB
25(OH)D3 SERPL-MCNC: 22.4 NG/ML (ref 30–100)
ALBUMIN SERPL-MCNC: 4.1 G/DL (ref 3.5–5)
ALBUMIN/GLOB SERPL: 1 (ref 1.1–2.2)
ALP SERPL-CCNC: 118 U/L (ref 45–117)
ALT SERPL-CCNC: 32 U/L (ref 12–78)
ANION GAP SERPL CALC-SCNC: 6 MMOL/L (ref 2–12)
AST SERPL-CCNC: 14 U/L (ref 15–37)
BASOPHILS # BLD: 0.06 K/UL (ref 0–0.1)
BASOPHILS NFR BLD: 0.8 % (ref 0–1)
BILIRUB SERPL-MCNC: 0.4 MG/DL (ref 0.2–1)
BUN SERPL-MCNC: 9 MG/DL (ref 6–20)
BUN/CREAT SERPL: 11 (ref 12–20)
CALCIUM SERPL-MCNC: 9.7 MG/DL (ref 8.5–10.1)
CHLORIDE SERPL-SCNC: 104 MMOL/L (ref 97–108)
CHOLEST SERPL-MCNC: 212 MG/DL
CO2 SERPL-SCNC: 27 MMOL/L (ref 21–32)
CREAT SERPL-MCNC: 0.84 MG/DL (ref 0.55–1.02)
DIFFERENTIAL METHOD BLD: ABNORMAL
EOSINOPHIL # BLD: 0.45 K/UL (ref 0–0.4)
EOSINOPHIL NFR BLD: 6.1 % (ref 0–7)
ERYTHROCYTE [DISTWIDTH] IN BLOOD BY AUTOMATED COUNT: 12.1 % (ref 11.5–14.5)
EST. AVERAGE GLUCOSE BLD GHB EST-MCNC: 120 MG/DL
GLOBULIN SER CALC-MCNC: 4.2 G/DL (ref 2–4)
GLUCOSE SERPL-MCNC: 107 MG/DL (ref 65–100)
HBA1C MFR BLD: 5.8 % (ref 4–5.6)
HCT VFR BLD AUTO: 41.9 % (ref 35–47)
HDLC SERPL-MCNC: 61 MG/DL
HDLC SERPL: 3.5 (ref 0–5)
HGB BLD-MCNC: 13.1 G/DL (ref 11.5–16)
IMM GRANULOCYTES # BLD AUTO: 0.02 K/UL (ref 0–0.04)
IMM GRANULOCYTES NFR BLD AUTO: 0.3 % (ref 0–0.5)
LDLC SERPL CALC-MCNC: 119.4 MG/DL (ref 0–100)
LYMPHOCYTES # BLD: 2.12 K/UL (ref 0.8–3.5)
LYMPHOCYTES NFR BLD: 28.6 % (ref 12–49)
MCH RBC QN AUTO: 31 PG (ref 26–34)
MCHC RBC AUTO-ENTMCNC: 31.3 G/DL (ref 30–36.5)
MCV RBC AUTO: 99.1 FL (ref 80–99)
MONOCYTES # BLD: 0.44 K/UL (ref 0–1)
MONOCYTES NFR BLD: 5.9 % (ref 5–13)
NEUTS SEG # BLD: 4.31 K/UL (ref 1.8–8)
NEUTS SEG NFR BLD: 58.3 % (ref 32–75)
NRBC # BLD: 0 K/UL (ref 0–0.01)
NRBC BLD-RTO: 0 PER 100 WBC
PLATELET # BLD AUTO: 310 K/UL (ref 150–400)
PMV BLD AUTO: 9.6 FL (ref 8.9–12.9)
POTASSIUM SERPL-SCNC: 3.7 MMOL/L (ref 3.5–5.1)
PROT SERPL-MCNC: 8.3 G/DL (ref 6.4–8.2)
RBC # BLD AUTO: 4.23 M/UL (ref 3.8–5.2)
SODIUM SERPL-SCNC: 137 MMOL/L (ref 136–145)
TRIGL SERPL-MCNC: 158 MG/DL
VLDLC SERPL CALC-MCNC: 31.6 MG/DL
WBC # BLD AUTO: 7.4 K/UL (ref 3.6–11)

## 2025-06-27 LAB — TSH SERPL DL<=0.05 MIU/L-ACNC: 1.55 UIU/ML (ref 0.45–4.5)

## 2025-08-08 DIAGNOSIS — E55.9 VITAMIN D DEFICIENCY: Primary | ICD-10-CM

## 2025-08-08 RX ORDER — ERGOCALCIFEROL 1.25 MG/1
50000 CAPSULE, LIQUID FILLED ORAL WEEKLY
Qty: 12 CAPSULE | Refills: 1 | Status: SHIPPED | OUTPATIENT
Start: 2025-08-08